# Patient Record
Sex: FEMALE | Race: WHITE | Employment: UNEMPLOYED | URBAN - METROPOLITAN AREA
[De-identification: names, ages, dates, MRNs, and addresses within clinical notes are randomized per-mention and may not be internally consistent; named-entity substitution may affect disease eponyms.]

---

## 2018-05-03 ENCOUNTER — APPOINTMENT (OUTPATIENT)
Dept: ULTRASOUND IMAGING | Age: 38
DRG: 742 | End: 2018-05-03
Payer: SUBSIDIZED

## 2018-05-03 ENCOUNTER — HOSPITAL ENCOUNTER (INPATIENT)
Age: 38
LOS: 3 days | Discharge: HOME | DRG: 742 | End: 2018-05-09
Admitting: FAMILY MEDICINE
Payer: SUBSIDIZED

## 2018-05-03 ENCOUNTER — APPOINTMENT (OUTPATIENT)
Dept: CT IMAGING | Age: 38
DRG: 742 | End: 2018-05-03
Payer: SUBSIDIZED

## 2018-05-03 ENCOUNTER — APPOINTMENT (OUTPATIENT)
Dept: GENERAL RADIOLOGY | Age: 38
DRG: 742 | End: 2018-05-03
Payer: SUBSIDIZED

## 2018-05-03 DIAGNOSIS — D72.829 LEUKOCYTOSIS, UNSPECIFIED TYPE: Primary | ICD-10-CM

## 2018-05-03 DIAGNOSIS — R10.84 GENERALIZED ABDOMINAL PAIN: ICD-10-CM

## 2018-05-03 PROBLEM — K80.20 CHOLELITHIASES: Status: ACTIVE | Noted: 2018-05-03

## 2018-05-03 PROBLEM — R10.9 ABDOMINAL PAIN: Status: ACTIVE | Noted: 2018-05-03

## 2018-05-03 PROBLEM — Z86.19 HISTORY OF HEPATITIS C: Status: ACTIVE | Noted: 2018-05-03

## 2018-05-03 LAB
ALBUMIN SERPL-MCNC: 3.2 G/DL (ref 3.5–5)
ALBUMIN/GLOB SERPL: 0.7 {RATIO} (ref 1.2–3.5)
ALP SERPL-CCNC: 82 U/L (ref 50–136)
ALT SERPL-CCNC: 30 U/L (ref 12–65)
AMPHET UR QL SCN: NEGATIVE
ANION GAP SERPL CALC-SCNC: 8 MMOL/L (ref 7–16)
APPEARANCE UR: ABNORMAL
AST SERPL-CCNC: 14 U/L (ref 15–37)
BACTERIA URNS QL MICRO: 0 /HPF
BARBITURATES UR QL SCN: NEGATIVE
BASOPHILS # BLD: 0 K/UL (ref 0–0.2)
BASOPHILS NFR BLD: 0 % (ref 0–2)
BENZODIAZ UR QL: NEGATIVE
BILIRUB SERPL-MCNC: 0.4 MG/DL (ref 0.2–1.1)
BILIRUB UR QL: ABNORMAL
BUN SERPL-MCNC: 10 MG/DL (ref 6–23)
CALCIUM SERPL-MCNC: 8.4 MG/DL (ref 8.3–10.4)
CANNABINOIDS UR QL SCN: POSITIVE
CASTS URNS QL MICRO: ABNORMAL /LPF
CHLORIDE SERPL-SCNC: 106 MMOL/L (ref 98–107)
CO2 SERPL-SCNC: 25 MMOL/L (ref 21–32)
COCAINE UR QL SCN: NEGATIVE
COLOR UR: ABNORMAL
CREAT SERPL-MCNC: 0.65 MG/DL (ref 0.6–1)
DIFFERENTIAL METHOD BLD: ABNORMAL
EOSINOPHIL # BLD: 0 K/UL (ref 0–0.8)
EOSINOPHIL NFR BLD: 0 % (ref 0.5–7.8)
EPI CELLS #/AREA URNS HPF: ABNORMAL /HPF
ERYTHROCYTE [DISTWIDTH] IN BLOOD BY AUTOMATED COUNT: 15 % (ref 11.9–14.6)
ERYTHROCYTE [DISTWIDTH] IN BLOOD BY AUTOMATED COUNT: 15 % (ref 11.9–14.6)
FLUAV AG NPH QL IA: NEGATIVE
FLUBV AG NPH QL IA: NEGATIVE
GLOBULIN SER CALC-MCNC: 4.5 G/DL (ref 2.3–3.5)
GLUCOSE SERPL-MCNC: 105 MG/DL (ref 65–100)
GLUCOSE UR STRIP.AUTO-MCNC: NEGATIVE MG/DL
HCG UR QL: NEGATIVE
HCT VFR BLD AUTO: 27.4 % (ref 35.8–46.3)
HCT VFR BLD AUTO: 30 % (ref 35.8–46.3)
HGB BLD-MCNC: 8.8 G/DL (ref 11.7–15.4)
HGB BLD-MCNC: 9.6 G/DL (ref 11.7–15.4)
HGB UR QL STRIP: ABNORMAL
IMM GRANULOCYTES # BLD: 0.1 K/UL (ref 0–0.5)
IMM GRANULOCYTES NFR BLD AUTO: 0 % (ref 0–5)
KETONES UR QL STRIP.AUTO: ABNORMAL MG/DL
LACTATE SERPL-SCNC: 0.7 MMOL/L (ref 0.4–2)
LEUKOCYTE ESTERASE UR QL STRIP.AUTO: ABNORMAL
LIPASE SERPL-CCNC: 73 U/L (ref 73–393)
LYMPHOCYTES # BLD: 1.5 K/UL (ref 0.5–4.6)
LYMPHOCYTES NFR BLD: 5 % (ref 13–44)
MCH RBC QN AUTO: 25.8 PG (ref 26.1–32.9)
MCH RBC QN AUTO: 26.1 PG (ref 26.1–32.9)
MCHC RBC AUTO-ENTMCNC: 32 G/DL (ref 31.4–35)
MCHC RBC AUTO-ENTMCNC: 32.1 G/DL (ref 31.4–35)
MCV RBC AUTO: 80.6 FL (ref 79.6–97.8)
MCV RBC AUTO: 81.3 FL (ref 79.6–97.8)
METHADONE UR QL: NEGATIVE
MONOCYTES # BLD: 0.9 K/UL (ref 0.1–1.3)
MONOCYTES NFR BLD: 3 % (ref 4–12)
NEUTS SEG # BLD: 24.7 K/UL (ref 1.7–8.2)
NEUTS SEG NFR BLD: 92 % (ref 43–78)
NITRITE UR QL STRIP.AUTO: POSITIVE
OPIATES UR QL: NEGATIVE
PCP UR QL: NEGATIVE
PH UR STRIP: 5.5 [PH] (ref 5–9)
PLATELET # BLD AUTO: 392 K/UL (ref 150–450)
PLATELET # BLD AUTO: 510 K/UL (ref 150–450)
PMV BLD AUTO: 9.4 FL (ref 10.8–14.1)
PMV BLD AUTO: 9.6 FL (ref 10.8–14.1)
POTASSIUM SERPL-SCNC: 3.6 MMOL/L (ref 3.5–5.1)
PROT SERPL-MCNC: 7.7 G/DL (ref 6.3–8.2)
PROT UR STRIP-MCNC: 100 MG/DL
RBC # BLD AUTO: 3.37 M/UL (ref 4.05–5.25)
RBC # BLD AUTO: 3.72 M/UL (ref 4.05–5.25)
RBC #/AREA URNS HPF: >100 /HPF
SERVICE CMNT-IMP: NORMAL
SODIUM SERPL-SCNC: 139 MMOL/L (ref 136–145)
SP GR UR REFRACTOMETRY: 1.03 (ref 1–1.02)
UROBILINOGEN UR QL STRIP.AUTO: 1 EU/DL (ref 0.2–1)
WBC # BLD AUTO: 21.9 K/UL (ref 4.3–11.1)
WBC # BLD AUTO: 27.2 K/UL (ref 4.3–11.1)
WBC URNS QL MICRO: ABNORMAL /HPF
WET PREP GENITAL: NORMAL

## 2018-05-03 PROCEDURE — 99285 EMERGENCY DEPT VISIT HI MDM: CPT

## 2018-05-03 PROCEDURE — 83690 ASSAY OF LIPASE: CPT | Performed by: FAMILY MEDICINE

## 2018-05-03 PROCEDURE — 74011250636 HC RX REV CODE- 250/636: Performed by: FAMILY MEDICINE

## 2018-05-03 PROCEDURE — 87491 CHLMYD TRACH DNA AMP PROBE: CPT

## 2018-05-03 PROCEDURE — 80307 DRUG TEST PRSMV CHEM ANLYZR: CPT

## 2018-05-03 PROCEDURE — 76830 TRANSVAGINAL US NON-OB: CPT

## 2018-05-03 PROCEDURE — 81001 URINALYSIS AUTO W/SCOPE: CPT | Performed by: FAMILY MEDICINE

## 2018-05-03 PROCEDURE — 74011250637 HC RX REV CODE- 250/637: Performed by: FAMILY MEDICINE

## 2018-05-03 PROCEDURE — 85027 COMPLETE CBC AUTOMATED: CPT

## 2018-05-03 PROCEDURE — 74011250637 HC RX REV CODE- 250/637

## 2018-05-03 PROCEDURE — 74011250636 HC RX REV CODE- 250/636

## 2018-05-03 PROCEDURE — 71046 X-RAY EXAM CHEST 2 VIEWS: CPT

## 2018-05-03 PROCEDURE — 81003 URINALYSIS AUTO W/O SCOPE: CPT

## 2018-05-03 PROCEDURE — 96376 TX/PRO/DX INJ SAME DRUG ADON: CPT

## 2018-05-03 PROCEDURE — 80053 COMPREHEN METABOLIC PANEL: CPT

## 2018-05-03 PROCEDURE — 96365 THER/PROPH/DIAG IV INF INIT: CPT

## 2018-05-03 PROCEDURE — 74177 CT ABD & PELVIS W/CONTRAST: CPT

## 2018-05-03 PROCEDURE — 83605 ASSAY OF LACTIC ACID: CPT

## 2018-05-03 PROCEDURE — 74011636320 HC RX REV CODE- 636/320

## 2018-05-03 PROCEDURE — 74011000258 HC RX REV CODE- 258

## 2018-05-03 PROCEDURE — 74011000258 HC RX REV CODE- 258: Performed by: FAMILY MEDICINE

## 2018-05-03 PROCEDURE — 96375 TX/PRO/DX INJ NEW DRUG ADDON: CPT

## 2018-05-03 PROCEDURE — 87210 SMEAR WET MOUNT SALINE/INK: CPT

## 2018-05-03 PROCEDURE — 99218 HC RM OBSERVATION: CPT

## 2018-05-03 PROCEDURE — 87804 INFLUENZA ASSAY W/OPTIC: CPT | Performed by: FAMILY MEDICINE

## 2018-05-03 PROCEDURE — 96366 THER/PROPH/DIAG IV INF ADDON: CPT

## 2018-05-03 PROCEDURE — 87086 URINE CULTURE/COLONY COUNT: CPT | Performed by: FAMILY MEDICINE

## 2018-05-03 PROCEDURE — 96367 TX/PROPH/DG ADDL SEQ IV INF: CPT

## 2018-05-03 PROCEDURE — 81025 URINE PREGNANCY TEST: CPT

## 2018-05-03 PROCEDURE — 76705 ECHO EXAM OF ABDOMEN: CPT

## 2018-05-03 PROCEDURE — 96361 HYDRATE IV INFUSION ADD-ON: CPT

## 2018-05-03 PROCEDURE — 85025 COMPLETE CBC W/AUTO DIFF WBC: CPT

## 2018-05-03 RX ORDER — HEPARIN SODIUM 5000 [USP'U]/ML
5000 INJECTION, SOLUTION INTRAVENOUS; SUBCUTANEOUS EVERY 8 HOURS
Status: DISCONTINUED | OUTPATIENT
Start: 2018-05-03 | End: 2018-05-08

## 2018-05-03 RX ORDER — ONDANSETRON 2 MG/ML
4 INJECTION INTRAMUSCULAR; INTRAVENOUS
Status: COMPLETED | OUTPATIENT
Start: 2018-05-03 | End: 2018-05-03

## 2018-05-03 RX ORDER — SODIUM CHLORIDE 9 MG/ML
75 INJECTION, SOLUTION INTRAVENOUS CONTINUOUS
Status: DISCONTINUED | OUTPATIENT
Start: 2018-05-03 | End: 2018-05-05

## 2018-05-03 RX ORDER — ONDANSETRON 2 MG/ML
4 INJECTION INTRAMUSCULAR; INTRAVENOUS
Status: DISCONTINUED | OUTPATIENT
Start: 2018-05-03 | End: 2018-05-09 | Stop reason: HOSPADM

## 2018-05-03 RX ORDER — PHENAZOPYRIDINE HYDROCHLORIDE 95 MG/1
200 TABLET ORAL
Status: COMPLETED | OUTPATIENT
Start: 2018-05-03 | End: 2018-05-03

## 2018-05-03 RX ORDER — VANCOMYCIN HYDROCHLORIDE
1250 ONCE
Status: COMPLETED | OUTPATIENT
Start: 2018-05-03 | End: 2018-05-03

## 2018-05-03 RX ORDER — MORPHINE SULFATE 4 MG/ML
4 INJECTION INTRAVENOUS ONCE
Status: COMPLETED | OUTPATIENT
Start: 2018-05-03 | End: 2018-05-03

## 2018-05-03 RX ORDER — SODIUM CHLORIDE 0.9 % (FLUSH) 0.9 %
10 SYRINGE (ML) INJECTION
Status: COMPLETED | OUTPATIENT
Start: 2018-05-03 | End: 2018-05-03

## 2018-05-03 RX ORDER — KETOROLAC TROMETHAMINE 30 MG/ML
30 INJECTION, SOLUTION INTRAMUSCULAR; INTRAVENOUS
Status: COMPLETED | OUTPATIENT
Start: 2018-05-03 | End: 2018-05-03

## 2018-05-03 RX ORDER — HYDROCODONE BITARTRATE AND ACETAMINOPHEN 5; 325 MG/1; MG/1
1 TABLET ORAL
Status: DISCONTINUED | OUTPATIENT
Start: 2018-05-03 | End: 2018-05-08

## 2018-05-03 RX ORDER — SODIUM CHLORIDE 0.9 % (FLUSH) 0.9 %
5-10 SYRINGE (ML) INJECTION AS NEEDED
Status: DISCONTINUED | OUTPATIENT
Start: 2018-05-03 | End: 2018-05-09 | Stop reason: HOSPADM

## 2018-05-03 RX ORDER — HYDROMORPHONE HYDROCHLORIDE 2 MG/ML
0.5 INJECTION, SOLUTION INTRAMUSCULAR; INTRAVENOUS; SUBCUTANEOUS
Status: DISCONTINUED | OUTPATIENT
Start: 2018-05-03 | End: 2018-05-04

## 2018-05-03 RX ORDER — ACETAMINOPHEN 325 MG/1
650 TABLET ORAL
Status: DISCONTINUED | OUTPATIENT
Start: 2018-05-03 | End: 2018-05-09 | Stop reason: HOSPADM

## 2018-05-03 RX ORDER — HEPARIN SODIUM 5000 [USP'U]/ML
5000 INJECTION, SOLUTION INTRAVENOUS; SUBCUTANEOUS EVERY 8 HOURS
Status: DISCONTINUED | OUTPATIENT
Start: 2018-05-03 | End: 2018-05-03 | Stop reason: SDUPTHER

## 2018-05-03 RX ORDER — SODIUM CHLORIDE 0.9 % (FLUSH) 0.9 %
5-10 SYRINGE (ML) INJECTION EVERY 8 HOURS
Status: DISCONTINUED | OUTPATIENT
Start: 2018-05-03 | End: 2018-05-08

## 2018-05-03 RX ADMIN — SODIUM CHLORIDE 100 ML: 900 INJECTION, SOLUTION INTRAVENOUS at 08:37

## 2018-05-03 RX ADMIN — SODIUM CHLORIDE 1000 ML: 900 INJECTION, SOLUTION INTRAVENOUS at 07:36

## 2018-05-03 RX ADMIN — HYDROMORPHONE HYDROCHLORIDE 0.5 MG: 2 INJECTION, SOLUTION INTRAMUSCULAR; INTRAVENOUS; SUBCUTANEOUS at 19:01

## 2018-05-03 RX ADMIN — MORPHINE SULFATE 4 MG: 4 INJECTION INTRAVENOUS at 13:50

## 2018-05-03 RX ADMIN — ONDANSETRON 4 MG: 2 INJECTION INTRAMUSCULAR; INTRAVENOUS at 19:01

## 2018-05-03 RX ADMIN — ACETAMINOPHEN 650 MG: 325 TABLET ORAL at 19:53

## 2018-05-03 RX ADMIN — Medication 10 ML: at 08:37

## 2018-05-03 RX ADMIN — PIPERACILLIN SODIUM,TAZOBACTAM SODIUM 4.5 G: 4; .5 INJECTION, POWDER, FOR SOLUTION INTRAVENOUS at 12:37

## 2018-05-03 RX ADMIN — URINARY PAIN RELIEF 190 MG: 95 TABLET ORAL at 07:36

## 2018-05-03 RX ADMIN — SODIUM CHLORIDE 125 ML/HR: 900 INJECTION, SOLUTION INTRAVENOUS at 20:08

## 2018-05-03 RX ADMIN — PIPERACILLIN SODIUM,TAZOBACTAM SODIUM 3.38 G: 3; .375 INJECTION, POWDER, FOR SOLUTION INTRAVENOUS at 20:07

## 2018-05-03 RX ADMIN — MORPHINE SULFATE 4 MG: 4 INJECTION INTRAVENOUS at 08:11

## 2018-05-03 RX ADMIN — KETOROLAC TROMETHAMINE 30 MG: 30 INJECTION, SOLUTION INTRAMUSCULAR at 07:36

## 2018-05-03 RX ADMIN — HYDROCODONE BITARTRATE AND ACETAMINOPHEN 1 TABLET: 5; 325 TABLET ORAL at 22:19

## 2018-05-03 RX ADMIN — IOPAMIDOL 100 ML: 755 INJECTION, SOLUTION INTRAVENOUS at 08:37

## 2018-05-03 RX ADMIN — ONDANSETRON 4 MG: 2 INJECTION INTRAMUSCULAR; INTRAVENOUS at 07:36

## 2018-05-03 RX ADMIN — HEPARIN SODIUM 5000 UNITS: 5000 INJECTION, SOLUTION INTRAVENOUS; SUBCUTANEOUS at 22:19

## 2018-05-03 RX ADMIN — VANCOMYCIN HYDROCHLORIDE 1250 MG: 10 INJECTION, POWDER, LYOPHILIZED, FOR SOLUTION INTRAVENOUS at 13:01

## 2018-05-03 RX ADMIN — SODIUM CHLORIDE 1000 ML: 900 INJECTION, SOLUTION INTRAVENOUS at 12:40

## 2018-05-03 NOTE — ED PROVIDER NOTES
HPI Comments: 40-year-old female complaining of suprapubic abdominal pain. Patient also describes urinary frequency and urgency. hhistory reports intermittent fever. She's had no unusual vaginal discharge. She has had a history of STDs in the remote past but nothing recently. The patient recently had been in skilled nursing  And released. 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 Patient is a 40 y.o. female presenting with abdominal pain. The history is provided by the patient. Abdominal Pain    This is a new problem. The current episode started more than 2 days ago. The problem occurs constantly. The problem has been rapidly worsening. The pain is associated with an unknown factor. The pain is located in the suprapubic region. The quality of the pain is sharp. The pain is at a severity of 10/10. The pain is severe. Associated symptoms include dysuria, frequency and hematuria. Pertinent negatives include no diarrhea and no constipation. Nothing worsens the pain. The pain is relieved by nothing. Past workup includes CT scan, ultrasound. Her past medical history is significant for gallstones and UTI. The patient's surgical history non-contributory. Past Medical History:   Diagnosis Date    Gall stones     Hepatitis C        Past Surgical History:   Procedure Laterality Date    BREAST SURGERY PROCEDURE UNLISTED      abscess    HX DILATION AND CURETTAGE           History reviewed. No pertinent family history. Social History     Social History    Marital status:      Spouse name: N/A    Number of children: N/A    Years of education: N/A     Occupational History    Not on file.      Social History Main Topics    Smoking status: Current Every Day Smoker     Packs/day: 1.00    Smokeless tobacco: Not on file    Alcohol use No    Drug use: No    Sexual activity: Not on file     Other Topics Concern    Not on file     Social History Narrative    No narrative on file         ALLERGIES: Review of patient's allergies indicates no known allergies. Review of Systems   Constitutional: Negative. Negative for activity change. HENT: Negative. Eyes: Negative. Respiratory: Negative. Cardiovascular: Negative. Gastrointestinal: Positive for abdominal pain. Negative for constipation and diarrhea. Genitourinary: Positive for dysuria, frequency and hematuria. Musculoskeletal: Negative. Skin: Negative. Neurological: Negative. Psychiatric/Behavioral: Negative. All other systems reviewed and are negative. Vitals:    05/03/18 0647   BP: 99/61   Pulse: (!) 110   Resp: 20   Temp: 98.9 °F (37.2 °C)   SpO2: 97%   Weight: 77.1 kg (170 lb)   Height: 5' 3\" (1.6 m)            Physical Exam   Constitutional: She is oriented to person, place, and time. She appears well-developed and well-nourished. No distress. HENT:   Head: Normocephalic and atraumatic. Right Ear: External ear normal.   Left Ear: External ear normal.   Nose: Nose normal.   Mouth/Throat: Oropharynx is clear and moist. No oropharyngeal exudate. Eyes: Conjunctivae and EOM are normal. Pupils are equal, round, and reactive to light. Right eye exhibits no discharge. Left eye exhibits no discharge. No scleral icterus. Neck: Normal range of motion. Neck supple. No JVD present. No tracheal deviation present. Cardiovascular: Normal rate, regular rhythm and intact distal pulses. Pulmonary/Chest: Effort normal and breath sounds normal. No stridor. No respiratory distress. She has no wheezes. She exhibits no tenderness. Abdominal: Soft. Bowel sounds are normal. She exhibits no distension and no mass. There is tenderness in the right lower quadrant and suprapubic area. There is no rigidity, no rebound, no guarding, no tenderness at McBurney's point and negative Hudson's sign. Musculoskeletal: Normal range of motion. She exhibits no edema or tenderness.    Neurological: She is alert and oriented to person, place, and time. No cranial nerve deficit. Skin: Skin is warm and dry. No rash noted. She is not diaphoretic. No erythema. No pallor. Psychiatric: She has a normal mood and affect. Her behavior is normal. Thought content normal.   Nursing note and vitals reviewed. MDM  Number of Diagnoses or Management Options  Leukocytosis, unspecified type:   Diagnosis management comments: Patient has elevated blood count leukocytosis unknown origin. Patient had a normal CAT scan except for gallbladder that was slightly distended and had gallstones. Ultrasound was done to follow this and no obvious cholecystitis. Also patient had a ultrasound of the pelvis that was essentially negative for acute infections process. Patient had pelvic exam done  Did not show chandelier sign or cervical motion tenderness. There is minimal blood tinged discharge. Few clue cells were noted. .. From that physical findings and imaging a clear cause of her leukocytosis was not  Defined. Here for hospitalist was asked to admit for further workup.         ED Course       Procedures

## 2018-05-03 NOTE — IP AVS SNAPSHOT
[address] [facility] 
 
 
 [address] 
[phone] Patient: Durga Camarena MRN: [id number] About your hospitalization You were admitted on:  May 3, 2018 You last received care in the:  [facility] SURG You were discharged on:  May 9, 2018 Why you were hospitalized Your primary diagnosis was:  Abdominal Pain Your diagnoses also included:  Cholelithiases, Leukocytosis, Pelvic Pain, History Of Hepatitis C, Uti (Urinary Tract Infection), Bacterial Vaginosis, Endometritis, Llq Pain, Hematuria, Intramural Leiomyoma Of Uterus Follow-up Information Follow up With Details Comments Contact Info Shilpi Nuñez MD On 5/17/2018 At 9:45 [id number] [address] [place] [address] 
[phone] 
  
   you will be contacted by  after follow up appt with PCP Your Scheduled Appointments Thursday May 17, 2018  9:45 AM EDT Follow Up with Shilpi Nuñez MD  
Formerly [facility] SMILEY Sanchez [address] [place] [id number]  
[phone] Discharge Orders None A check wendy indicates which time of day the medication should be taken. My Medications START taking these medications Instructions Each Dose to Equal  
 Morning Noon Evening Bedtime  
 ondansetron 4 mg disintegrating tablet Commonly known as:  ZOFRAN ODT Your next dose is:  As needed Take 1 Tab by mouth every four (4) hours as needed. 4 mg  
    
   
   
   
  
 oxyCODONE IR 10 mg Tab immediate release tablet Commonly known as:  Venkat Tayler Your next dose is:  As needed Take 1 Tab by mouth every four (4) hours as needed. Max Daily Amount: 60 mg.  
 10 mg  
    
   
   
   
  
 senna-docusate 8.6-50 mg per tablet Commonly known as:  Cherry Valeria Your next dose is:  5-10 Take 2 Tabs by mouth daily. 2 Tab CONTINUE taking these medications Instructions Each Dose to Equal  
 Morning Noon Evening Bedtime  
 sertraline 100 mg tablet Commonly known as:  ZOLOFT Your next dose is:  5-10 Take 100 mg by mouth daily. 100 mg  
    
   
   
   
  
 traZODone 150 mg tablet Commonly known as:  Jazz Homme Your next dose is:  bedtime Take 150 mg by mouth nightly. 150 mg Where to Get Your Medications Information on where to get these meds will be given to you by the nurse or doctor. ! Ask your nurse or doctor about these medications  
  ondansetron 4 mg disintegrating tablet  
 oxyCODONE IR 10 mg Tab immediate release tablet  
 senna-docusate 8.6-50 mg per tablet Opioid Education Prescription Opioids: What You Need to Know: 
 
Prescription opioids can be used to help relieve moderate-to-severe pain and are often prescribed following a surgery or injury, or for certain health conditions. These medications can be an important part of treatment but also come with serious risks. Opioids are strong pain medicines. Examples include hydrocodone, oxycodone, fentanyl, and morphine. Heroin is an example of an illegal opioid. It is important to work with your health care provider to make sure you are getting the safest, most effective care. WHAT ARE THE RISKS AND SIDE EFFECTS OF OPIOID USE? Prescription opioids carry serious risks of addiction and overdose, especially with prolonged use. An opioid overdose, often marked by slow breathing, can cause sudden death. The use of prescription opioids can have a number of side effects as well, even when taken as directed. · Tolerance-meaning you might need to take more of a medication for the same pain relief · Physical dependence-meaning you have symptoms of withdrawal when the medication is stopped. Withdrawal symptoms can include nausea, sweating, chills, diarrhea, stomach cramps, and muscle aches. Withdrawal can last up to several weeks, depending on which drug you took and how long you took it. · Increased sensitivity to pain · Constipation · Nausea, vomiting, and dry mouth · Sleepiness and dizziness · Confusion · Depression · Low levels of testosterone that can result in lower sex drive, energy, and strength · Itching and sweating RISKS ARE GREATER WITH:      
· History of drug misuse, substance use disorder, or overdose · Mental health conditions (such as depression or anxiety) · Sleep apnea · Older age (72 years or older) · Pregnancy Avoid alcohol while taking prescription opioids. Also, unless specifically advised by your health care provider, medications to avoid include: · Benzodiazepines (such as Xanax or Valium) · Muscle relaxants (such as Soma or Flexeril) · Hypnotics (such as Ambien or Lunesta) · Other prescription opioids KNOW YOUR OPTIONS Talk to your health care provider about ways to manage your pain that don't involve prescription opioids. Some of these options may actually work better and have fewer risks and side effects. Options may include: 
· Pain relievers such as acetaminophen, ibuprofen, and naproxen · Some medications that are also used for depression or seizures · Physical therapy and exercise · Counseling to help patients learn how to cope better with triggers of pain and stress. · Application of heat or cold compress · Massage therapy · Relaxation techniques Be Informed Make sure you know the name of your medication, how much and how often to take it, and its potential risks & side effects.  
 
IF YOU ARE PRESCRIBED OPIOIDS FOR PAIN: 
 · Never take opioids in greater amounts or more often than prescribed. Remember the goal is not to be pain-free but to manage your pain at a tolerable level. · Follow up with your primary care provider to: · Work together to create a plan on how to manage your pain. · Talk about ways to help manage your pain that don't involve prescription opioids. · Talk about any and all concerns and side effects. · Help prevent misuse and abuse. · Never sell or share prescription opioids · Help prevent misuse and abuse. · Store prescription opioids in a secure place and out of reach of others (this may include visitors, children, friends, and family). · Safely dispose of unused/unwanted prescription opioids: Find your community drug take-back program or your pharmacy mail-back program, or flush them down the toilet, following guidance from the Food and Drug Administration (www.fda.gov/Drugs/ResourcesForYou). · Visit www.cdc.gov/drugoverdose to learn about the risks of opioid abuse and overdose. · If you believe you may be struggling with addiction, tell your health care provider and ask for guidance or call [address] [place], [place] [address]Hopela at [phone]. Discharge Instructions DISCHARGE SUMMARY from Nurse PATIENT INSTRUCTIONS: 
 
 
F-face looks uneven A-arms unable to move or move unevenly S-speech slurred or non-existent T-time-call 911 as soon as signs and symptoms begin-DO NOT go Back to bed or wait to see if you get better-TIME IS BRAIN. Warning Signs of HEART ATTACK Call 911 if you have these symptoms: ? Chest discomfort. Most heart attacks involve discomfort in the center of the chest that lasts more than a few minutes, or that goes away and comes back. It can feel like uncomfortable pressure, squeezing, fullness, or pain. ? Discomfort in other areas of the upper body. Symptoms can include pain or discomfort in one or both arms, the back, neck, jaw, or stomach. ? Shortness of breath with or without chest discomfort. ? Other signs may include breaking out in a cold sweat, nausea, or lightheadedness. Don't wait more than five minutes to call [address]! Fast action can save your life. Calling 911 is almost always the fastest way to get lifesaving treatment. Emergency Medical Services staff can begin treatment when they arrive  up to an hour sooner than if someone gets to the hospital by car. The discharge information has been reviewed with the patient. The patient verbalized understanding. Discharge medications reviewed with the patient and appropriate educational materials and side effects teaching were provided. ___________________________________________________________________________________________________________________________________ Follow-up with Dr Ruddy Epley (Gen Surgery) approx 3 weeks after discharge in the office to discuss gallbladder surgery for gallstones at: 
[address] , [address] (CALL TO GET AN APPT TIME-->[phone]-->option 1) "[facility] [place]" Announcement We are excited to announce that we are making your provider's discharge notes available to you in "[facility] [place]". You will see these notes when they are completed and signed by the physician that discharged you from your recent hospital stay. If you have any questions or concerns about any information you see in [employer], please call the Health Information Department where you were seen or reach out to your Primary Care Provider for more information about your plan of care. Introducing [facility] & HEALTH SERVICES! [facility] introduces [employer] patient portal. Now you can access parts of your medical record, email your doctor's office, and request medication refills online. 1. In your internet browser, go to [email or web address] 2. Click on the First Time User? Click Here link in the Sign In box. You will see the New Member Sign Up page. 3. Enter your [employer] Access Code exactly as it appears below. You will not need to use this code after youve completed the sign-up process. If you do not sign up before the expiration date, you must request a new code. · [employer] Access Code: [email or web address] Expires: 8/1/2018  6:39 AM 
 
4. Enter the last four digits of your Social Security Number (xxxx) and Date of Birth (mm/dd/yyyy) as indicated and click Submit. You will be taken to the next sign-up page. 5. Create a [employer] ID. This will be your [employer] login ID and cannot be changed, so think of one that is secure and easy to remember. 6. Create a [employer] password. You can change your password at any time. 7. Enter your Password Reset Question and Answer. This can be used at a later time if you forget your password. 8. Enter your e-mail address. You will receive e-mail notification when new information is available in [address] 9. Click Sign Up. You can now view and download portions of your medical record. 10. Click the Download Summary menu link to download a portable copy of your medical information. If you have questions, please visit the Frequently Asked Questions section of the [employer] website. Remember, [employer] is NOT to be used for urgent needs. For medical emergencies, dial 911. Now available from your iPhone and Android! Introducing Daquan Traylor As a [facility] patient, I wanted to make you aware of our electronic visit tool called Daqaun Traylor. Regenerate allows you to connect within minutes with a medical provider 24 hours a day, seven days a week via a mobile device or tablet or logging into a secure website from your computer. You can access [employer] from anywhere in the [place]. A virtual visit might be right for you when you have a simple condition and feel like you just dont want to get out of bed, or cant get away from work for an appointment, when your regular My [facility] provider is not available (evenings, weekends or holidays), or when youre out of town and need minor care. Electronic visits cost only $49 and if the [employer] provider determines a prescription is needed to treat your condition, one can be electronically transmitted to a nearby pharmacy*. Please take a moment to enroll today if you have not already done so. The enrollment process is free and takes just a few minutes. To enroll, please download the Regenerate renato to your tablet or phone, or visit [email or web address] to enroll on your computer. And, as an [address] [place] [address] patient with a [employer] account, the results of your visits will be scanned into your electronic medical record and your primary care provider will be able to view the scanned results. We urge you to continue to see your regular My COI provider for your ongoing medical care. And while your primary care provider may not be the one available when you seek a [employer] virtual visit, the peace of mind you get from getting a real diagnosis real time can be priceless. For more information on [employer], view our Frequently Asked Questions (FAQs) at [email or web address]. Sincerely, 
 
Erika Myers MD 
Chief Medical Officer [employer] *:  certain medications cannot be prescribed via [employer]il[employer] Providers Seen During Your Hospitalization Provider Specialty Primary office phone Evan Rodriguez MD Emergency Medicine [phone] Shandra Farley MD [facility] [phone] Joceline Hyde MD [facility] [phone] Tammy Chamorro MD Gynecologic Oncology [phone] Your Primary Care Physician (PCP) Primary Care Physician Office Phone Office Fax NONE ** None ** ** None ** You are allergic to the following No active allergies Recent Documentation Height Weight BMI Smoking Status 1.6 m 77.1 kg 30.11 kg/m2 Current Every Day Smoker Emergency Contacts Name Discharge Info Relation Home Work Mobile Alpha Cristhian  Spouse [3] [phone] Patient Belongings The following personal items are in your possession at time of discharge: 
  Dental Appliances: None         Home Medications: None   Jewelry: None  Clothing: Undergarments    Other Valuables: None Discharge Instructions Attachments/References HYSTERECTOMY: VAGINAL LAPAROSCOPIC-ASSISTED: POST-OP (ENGLISH) Patient Handouts Laparoscopically Assisted Vaginal Hysterectomy: What to Expect at [facility] Your Recovery You can expect to feel better and stronger each day, although you may need pain medicine for a week or two. You may get tired easily or have less energy than usual. This may last for several weeks after surgery. You will probably notice that your belly is swollen and puffy. This is common. The swelling will take several weeks to go down. It may take about 4 to 6 weeks to fully recover. The recovery time may be less for some patients. You may have some light vaginal bleeding. Don't have sex until the doctor says it is okay. Don't douche or put anything into your vagina, such as a tampon, until your doctor says it is okay.  
It is important to avoid lifting while you are recovering so that you can heal. 
 This care sheet gives you a general idea about how long it will take for you to recover. But each person recovers at a different pace. Follow the steps below to get better as quickly as possible. How can you care for yourself at home? Activity ? · Rest when you feel tired. Getting enough sleep will help you recover. ? · Try to walk each day. Start by walking a little more than you did the day before. Bit by bit, increase the amount you walk. Walking boosts blood flow and helps prevent pneumonia and constipation. ? · Avoid lifting anything that would make you strain. This may include heavy grocery bags and milk containers, a heavy briefcase or backpack, cat litter or dog food bags, a vacuum , or a child. ? · Avoid strenuous activities, such as biking, jogging, weight lifting, or aerobic exercise, until your doctor says it is okay. ? · You may shower. Pat the cut (incision) dry. Do not take a bath for the first 2 weeks, or until your doctor tells you it is okay. ? · Ask your doctor when you can drive again. ? · You will probably need to take about 2 weeks off from work. It depends on the type of work you do and how you feel. ? · Your doctor will tell you when you can have sex again. Diet ? · You can eat your normal diet. If your stomach is upset, try bland, low-fat foods like plain rice, broiled chicken, toast, and yogurt. ? · Drink plenty of fluids (unless your doctor tells you not to). ? · You may notice that your bowel movements are not regular right after your surgery. This is common. Try to avoid constipation and straining with bowel movements. You may want to take a fiber supplement every day. If you have not had a bowel movement after a couple of days, ask your doctor about taking a mild laxative. Medicines ? · Your doctor will tell you if and when you can restart your medicines. He or she will also give you instructions about taking any new medicines. ? · If you take blood thinners, such as warfarin (Coumadin), clopidogrel (Plavix), or aspirin, be sure to talk to your doctor. He or she will tell you if and when to start taking those medicines again. Make sure that you understand exactly what your doctor wants you to do. ? · Be safe with medicines. Take pain medicines exactly as directed. ¨ If the doctor gave you a prescription medicine for pain, take it as prescribed. ¨ If you are not taking a prescription pain medicine, ask your doctor if you can take an over-the-counter medicine. ? · If you think your pain medicine is making you sick to your stomach: 
¨ Take your medicine after meals (unless your doctor has told you not to). ¨ Ask your doctor for a different pain medicine. ? · If your doctor prescribed antibiotics, take them as directed. Do not stop taking them just because you feel better. You need to take the full course of antibiotics. Incision care ? · You may have stitches over the cuts (incisions) the doctor made in your belly. If you have strips of tape on the incisions the doctor made, leave the tape on for a week or until it falls off. Or follow your doctor's instructions for removing the tape. ? · Wash the area daily with warm, soapy water, and pat it dry. Don't use hydrogen peroxide or alcohol, which can slow healing. You may cover the area with a gauze bandage if it weeps or rubs against clothing. Change the bandage every day. ? · Keep the area clean and dry. Other instructions ? · You may have some light vaginal bleeding. Wear sanitary pads if needed. Do not douche or use tampons. Follow-up care is a key part of your treatment and safety. Be sure to make and go to all appointments, and call your doctor if you are having problems. It's also a good idea to know your test results and keep a list of the medicines you take. When should you call for help? Call 911 anytime you think you may need emergency care. For example, call if: 
? · You passed out (lost consciousness). ? · You have chest pain, are short of breath, or cough up blood. ?Call your doctor now or seek immediate medical care if: 
? · You have pain that does not get better after you take pain medicine. ? · You cannot pass stools or gas. ? · You have vaginal discharge that has increased in amount or smells bad.  
? · You are sick to your stomach or cannot drink fluids. ? · You have loose stitches, or your incision comes open. ? · Bright red blood has soaked through the bandage over your incision. ? · You have signs of infection, such as: 
¨ Increased pain, swelling, warmth, or redness. ¨ Red streaks leading from the incision. ¨ Pus draining from the incision. ¨ A fever. ? · You have bright red vaginal bleeding that soaks one or more pads in an hour, or you have large clots. ? · You have signs of a blood clot in your leg (called a deep vein thrombosis), such as: 
¨ Pain in your calf, back of the knee, thigh, or groin. ¨ Redness and swelling in your leg. ? Watch closely for changes in your health, and be sure to contact your doctor if you have any problems. Where can you learn more? Go to http://candice-audra.info/. Enter U940 in the search box to learn more about \"Laparoscopically Assisted Vaginal Hysterectomy: What to Expect at Home. \" Current as of: October 13, 2016 Content Version: 11.4 © 1985-0047 [employer]. Care instructions adapted under license by [employer] (which disclaims liability or warranty for this information). If you have questions about a medical condition or this instruction, always ask your healthcare professional. [address] any warranty or liability for your use of this information. Please provide this summary of care documentation to your next provider. Signatures-by signing, you are acknowledging that this After Visit Summary has been reviewed with you and you have received a copy. Patient Signature:  ____________________________________________________________ Date:  ____________________________________________________________  
  
Sigmund [place] Provider Signature:  ____________________________________________________________ Date:  ____________________________________________________________

## 2018-05-03 NOTE — H&P
HOSPITALIST H&P/CONSULT  NAME:  Jennifer Holt   Age:  40 y.o.  :   1980   MRN:   [id number]  PCP: None  Consulting MD:  Treatment Team: Attending Provider: Ericka Quinn MD; Primary Nurse: Fortino Vaz  HPI:   44yo F with PMH of Hepatitis C presents with moderate to severe abdominal pain x1 week. She reports pain is mostly in lower abdomen and right quadrants and is associated with intermittent nausea and emesis. She denies diarrhea but reports chronic constipation. She has been using large quantities of Ibuprofen for pain control since last week. LMP was last week and she reports menses typically heavy with clots. She states this pain is different from menstrual cramps. In the ER, she is afebrile and normotensive. Her labs are pertinent for :  WBC 27K, HGB 9.6 and are otherwise insignificant. Complete ROS done and is as stated in HPI or otherwise negative  Past Medical History:   Diagnosis Date    Gall stones     Hepatitis C       Past Surgical History:   Procedure Laterality Date    BREAST SURGERY PROCEDURE UNLISTED      abscess    HX DILATION AND CURETTAGE        None     No Known Allergies   Social History   Substance Use Topics    Smoking status: Current Every Day Smoker     Packs/day: 1.00    Smokeless tobacco: Not on file    Alcohol use No      History reviewed. No pertinent family history. Objective:     Visit Vitals    /85    Pulse 85    Temp 98.9 °F (37.2 °C)    Resp 18    Ht 5' 3\" (1.6 m)    Wt 77.1 kg (170 lb)    SpO2 99%    BMI 30.11 kg/m2      Temp (24hrs), Av.9 °F (37.2 °C), Min:98.9 °F (37.2 °C), Max:98.9 °F (37.2 °C)    Oxygen Therapy  O2 Sat (%): 99 % (18 1355)  O2 Device: Room air (18 1220)  Physical Exam:  General:    Alert, cooperative, no distress, appears stated age. Head:   Normocephalic, without obvious abnormality, atraumatic. Nose:  Nares normal. No drainage or sinus tenderness.   Lungs:   Clear to auscultation bilaterally. No Wheezing or Rhonchi. No rales. Heart:   Regular rate and rhythm,  no murmur, rub or gallop. Abdomen:   Obese, Soft, diffusely TTP. No guarding or rebound. Not distended. Bowel sounds normal.   Extremities: No cyanosis. No edema. No clubbing  Skin:     Texture, turgor normal. No rashes or lesions. Not Jaundiced  Neurologic: Alert and oriented x 3, no focal deficits   Data Review:   Recent Results (from the past 24 hour(s))   CBC WITH AUTOMATED DIFF    Collection Time: 05/03/18  6:52 AM   Result Value Ref Range    WBC 27.2 (H) 4.3 - 11.1 K/uL    RBC 3.72 (L) 4.05 - 5.25 M/uL    HGB 9.6 (L) 11.7 - 15.4 g/dL    HCT 30.0 (L) 35.8 - 46.3 %    MCV 80.6 79.6 - 97.8 FL    MCH 25.8 (L) 26.1 - 32.9 PG    MCHC 32.0 31.4 - 35.0 g/dL    RDW 15.0 (H) 11.9 - 14.6 %    PLATELET 463 (H) 044 - 450 K/uL    MPV 9.6 (L) 10.8 - 14.1 FL    DF AUTOMATED      NEUTROPHILS 92 (H) 43 - 78 %    LYMPHOCYTES 5 (L) 13 - 44 %    MONOCYTES 3 (L) 4.0 - 12.0 %    EOSINOPHILS 0 (L) 0.5 - 7.8 %    BASOPHILS 0 0.0 - 2.0 %    IMMATURE GRANULOCYTES 0 0.0 - 5.0 %    ABS. NEUTROPHILS 24.7 (H) 1.7 - 8.2 K/UL    ABS. LYMPHOCYTES 1.5 0.5 - 4.6 K/UL    ABS. MONOCYTES 0.9 0.1 - 1.3 K/UL    ABS. EOSINOPHILS 0.0 0.0 - 0.8 K/UL    ABS. BASOPHILS 0.0 0.0 - 0.2 K/UL    ABS. IMM. GRANS. 0.1 0.0 - 0.5 K/UL   METABOLIC PANEL, COMPREHENSIVE    Collection Time: 05/03/18  6:52 AM   Result Value Ref Range    Sodium 139 136 - 145 mmol/L    Potassium 3.6 3.5 - 5.1 mmol/L    Chloride 106 98 - 107 mmol/L    CO2 25 21 - 32 mmol/L    Anion gap 8 7 - 16 mmol/L    Glucose 105 (H) 65 - 100 mg/dL    BUN 10 6 - 23 MG/DL    Creatinine 0.65 0.6 - 1.0 MG/DL    GFR est AA >60 >60 ml/min/1.73m2    GFR est non-AA >60 >60 ml/min/1.73m2    Calcium 8.4 8.3 - 10.4 MG/DL    Bilirubin, total 0.4 0.2 - 1.1 MG/DL    ALT (SGPT) 30 12 - 65 U/L    AST (SGOT) 14 (L) 15 - 37 U/L    Alk.  phosphatase 82 50 - 136 U/L    Protein, total 7.7 6.3 - 8.2 g/dL    Albumin 3.2 (L) 3.5 - 5.0 g/dL Globulin 4.5 (H) 2.3 - 3.5 g/dL    A-G Ratio 0.7 (L) 1.2 - 3.5     DRUG SCREEN, URINE    Collection Time: 05/03/18  7:26 AM   Result Value Ref Range    PCP(PHENCYCLIDINE) NEGATIVE       BENZODIAZEPINES NEGATIVE       COCAINE NEGATIVE       AMPHETAMINES NEGATIVE       METHADONE NEGATIVE       THC (TH-CANNABINOL) POSITIVE      OPIATES NEGATIVE       BARBITURATES NEGATIVE      HCG URINE, QL. - POC    Collection Time: 05/03/18  8:33 AM   Result Value Ref Range    Pregnancy test,urine (POC) NEGATIVE  NEG     WET PREP    Collection Time: 05/03/18  9:34 AM   Result Value Ref Range    Special Requests: NO SPECIAL REQUESTS      Wet prep NO WBCS SEEN      Wet prep NO YEAST SEEN      Wet prep NO TRICHOMONAS SEEN      Wet prep FEW  CLUE CELLS PRESENT       CBC W/O DIFF    Collection Time: 05/03/18 11:26 AM   Result Value Ref Range    WBC 21.9 (H) 4.3 - 11.1 K/uL    RBC 3.37 (L) 4.05 - 5.25 M/uL    HGB 8.8 (L) 11.7 - 15.4 g/dL    HCT 27.4 (L) 35.8 - 46.3 %    MCV 81.3 79.6 - 97.8 FL    MCH 26.1 26.1 - 32.9 PG    MCHC 32.1 31.4 - 35.0 g/dL    RDW 15.0 (H) 11.9 - 14.6 %    PLATELET 833 070 - 321 K/uL    MPV 9.4 (L) 10.8 - 14.1 FL   LACTIC ACID    Collection Time: 05/03/18 11:26 AM   Result Value Ref Range    Lactic acid 0.7 0.4 - 2.0 MMOL/L     Imaging /Procedures /Studies   Abdominal Ultrasound   Liver is enlarged measuring 20.8 cm in height. No liver mass. No intrahepatic  bile duct distention. Common bile duct is mildly prominent, 8 mm. Gallbladder is  distended. No wall thickening. Multiple shadowing stones are present. Pancreas  is normal. Right kidney is normal. No hydronephrosis. Abdominal aorta is normal  in size distally, 1.7 cm. IVC patent. Transabdominal and transvaginal images. The uterus measures 13 x 6.8 x 8.5 cm. There is a 7.4 x 6.9 cm fibroid in the  body of the uterus. Endometrial stripe is normal, 7 mm. The ovaries were not visualized. No adnexal mass or free fluid. CT Abd/pelvis Impression:  1. Cholelithiasis. Mild gallbladder distention. 2. Normal-appearing appendix    Assessment and Plan: Active Hospital Problems    Diagnosis Date Noted    Cholelithiases 05/03/2018    Leukocytosis 05/03/2018    Abdominal pain 05/03/2018   H/o Hepatitis C  Marijuana use      PLAN  · Urinalysis  And Lipase are pending  · No evidence of cholecystitis so source is still unknown  · Start Protonix for excessive Ibuprofen  · NPO except sips of clears. Advance diet as tolerated  · Continue  IVF  · Antiemetics prn and Pain control  · Repeat labs in am.  · S/p Vanc and Zosyn x1 in ER.  Continue Zosyn    Code Status: Full   Anticipated discharge: 2 midnights     Signed By: James Colindres MD     May 3, 2018

## 2018-05-03 NOTE — PROGRESS NOTES
Initial visit to assess pt's spiritual needs. Ministry of presence & prayer to demonstrate caring & concern, convey emotional & spiritual support.     aniceto Dominguez MDiv,ThM,PhD

## 2018-05-03 NOTE — ED TRIAGE NOTES
Patient states blood in urine, lower abdominal pain. States pain has been on going all week. States some nausea and vomiting. States blood in urine started this morning.

## 2018-05-04 PROBLEM — N71.9 ENDOMETRITIS: Status: ACTIVE | Noted: 2018-05-04

## 2018-05-04 PROBLEM — R31.9 HEMATURIA: Status: ACTIVE | Noted: 2018-05-04

## 2018-05-04 PROBLEM — N39.0 UTI (URINARY TRACT INFECTION): Status: ACTIVE | Noted: 2018-05-04

## 2018-05-04 PROBLEM — R10.32 LLQ PAIN: Status: ACTIVE | Noted: 2018-05-04

## 2018-05-04 PROBLEM — B96.89 BACTERIAL VAGINOSIS: Status: ACTIVE | Noted: 2018-05-04

## 2018-05-04 PROBLEM — N76.0 BACTERIAL VAGINOSIS: Status: ACTIVE | Noted: 2018-05-04

## 2018-05-04 PROBLEM — R10.2 PELVIC PAIN: Status: ACTIVE | Noted: 2018-05-03

## 2018-05-04 LAB
ALBUMIN SERPL-MCNC: 2.4 G/DL (ref 3.5–5)
ALBUMIN/GLOB SERPL: 0.6 {RATIO} (ref 1.2–3.5)
ALP SERPL-CCNC: 60 U/L (ref 50–136)
ALT SERPL-CCNC: 21 U/L (ref 12–65)
ANION GAP SERPL CALC-SCNC: 6 MMOL/L (ref 7–16)
AST SERPL-CCNC: 14 U/L (ref 15–37)
BASOPHILS # BLD: 0 K/UL (ref 0–0.2)
BASOPHILS NFR BLD: 0 % (ref 0–2)
BILIRUB SERPL-MCNC: 0.2 MG/DL (ref 0.2–1.1)
BUN SERPL-MCNC: 8 MG/DL (ref 6–23)
CALCIUM SERPL-MCNC: 7.5 MG/DL (ref 8.3–10.4)
CHLORIDE SERPL-SCNC: 109 MMOL/L (ref 98–107)
CO2 SERPL-SCNC: 27 MMOL/L (ref 21–32)
CREAT SERPL-MCNC: 0.48 MG/DL (ref 0.6–1)
DIFFERENTIAL METHOD BLD: ABNORMAL
EOSINOPHIL # BLD: 0.1 K/UL (ref 0–0.8)
EOSINOPHIL NFR BLD: 1 % (ref 0.5–7.8)
ERYTHROCYTE [DISTWIDTH] IN BLOOD BY AUTOMATED COUNT: 15 % (ref 11.9–14.6)
GLOBULIN SER CALC-MCNC: 3.7 G/DL (ref 2.3–3.5)
GLUCOSE SERPL-MCNC: 105 MG/DL (ref 65–100)
HCT VFR BLD AUTO: 25.2 % (ref 35.8–46.3)
HGB BLD-MCNC: 7.9 G/DL (ref 11.7–15.4)
IMM GRANULOCYTES # BLD: 0 K/UL (ref 0–0.5)
IMM GRANULOCYTES NFR BLD AUTO: 0 % (ref 0–5)
LYMPHOCYTES # BLD: 1.6 K/UL (ref 0.5–4.6)
LYMPHOCYTES NFR BLD: 15 % (ref 13–44)
MCH RBC QN AUTO: 25.5 PG (ref 26.1–32.9)
MCHC RBC AUTO-ENTMCNC: 31.3 G/DL (ref 31.4–35)
MCV RBC AUTO: 81.3 FL (ref 79.6–97.8)
MONOCYTES # BLD: 0.5 K/UL (ref 0.1–1.3)
MONOCYTES NFR BLD: 5 % (ref 4–12)
NEUTS SEG # BLD: 8.5 K/UL (ref 1.7–8.2)
NEUTS SEG NFR BLD: 79 % (ref 43–78)
PLATELET # BLD AUTO: 392 K/UL (ref 150–450)
PMV BLD AUTO: 9.1 FL (ref 10.8–14.1)
POTASSIUM SERPL-SCNC: 3.5 MMOL/L (ref 3.5–5.1)
PROT SERPL-MCNC: 6.1 G/DL (ref 6.3–8.2)
RBC # BLD AUTO: 3.1 M/UL (ref 4.05–5.25)
SODIUM SERPL-SCNC: 142 MMOL/L (ref 136–145)
WBC # BLD AUTO: 10.7 K/UL (ref 4.3–11.1)

## 2018-05-04 PROCEDURE — 99218 HC RM OBSERVATION: CPT

## 2018-05-04 PROCEDURE — 80053 COMPREHEN METABOLIC PANEL: CPT | Performed by: FAMILY MEDICINE

## 2018-05-04 PROCEDURE — 74011250636 HC RX REV CODE- 250/636: Performed by: FAMILY MEDICINE

## 2018-05-04 PROCEDURE — 36415 COLL VENOUS BLD VENIPUNCTURE: CPT | Performed by: FAMILY MEDICINE

## 2018-05-04 PROCEDURE — 74011250637 HC RX REV CODE- 250/637: Performed by: FAMILY MEDICINE

## 2018-05-04 PROCEDURE — 74011000258 HC RX REV CODE- 258: Performed by: FAMILY MEDICINE

## 2018-05-04 PROCEDURE — 85025 COMPLETE CBC W/AUTO DIFF WBC: CPT | Performed by: FAMILY MEDICINE

## 2018-05-04 RX ORDER — KETOROLAC TROMETHAMINE 15 MG/ML
15 INJECTION, SOLUTION INTRAMUSCULAR; INTRAVENOUS ONCE
Status: COMPLETED | OUTPATIENT
Start: 2018-05-04 | End: 2018-05-04

## 2018-05-04 RX ORDER — PANTOPRAZOLE SODIUM 40 MG/1
40 TABLET, DELAYED RELEASE ORAL
Status: DISCONTINUED | OUTPATIENT
Start: 2018-05-04 | End: 2018-05-09 | Stop reason: HOSPADM

## 2018-05-04 RX ORDER — MORPHINE SULFATE 4 MG/ML
1 INJECTION, SOLUTION INTRAMUSCULAR; INTRAVENOUS
Status: DISCONTINUED | OUTPATIENT
Start: 2018-05-04 | End: 2018-05-06

## 2018-05-04 RX ORDER — METRONIDAZOLE 500 MG/1
500 TABLET ORAL EVERY 12 HOURS
Status: DISCONTINUED | OUTPATIENT
Start: 2018-05-04 | End: 2018-05-08

## 2018-05-04 RX ADMIN — PIPERACILLIN SODIUM,TAZOBACTAM SODIUM 3.38 G: 3; .375 INJECTION, POWDER, FOR SOLUTION INTRAVENOUS at 13:25

## 2018-05-04 RX ADMIN — KETOROLAC TROMETHAMINE 15 MG: 15 INJECTION, SOLUTION INTRAMUSCULAR; INTRAVENOUS at 08:56

## 2018-05-04 RX ADMIN — HYDROMORPHONE HYDROCHLORIDE 0.5 MG: 2 INJECTION, SOLUTION INTRAMUSCULAR; INTRAVENOUS; SUBCUTANEOUS at 06:53

## 2018-05-04 RX ADMIN — HYDROCODONE BITARTRATE AND ACETAMINOPHEN 1 TABLET: 5; 325 TABLET ORAL at 20:59

## 2018-05-04 RX ADMIN — METRONIDAZOLE 500 MG: 500 TABLET ORAL at 20:59

## 2018-05-04 RX ADMIN — PANTOPRAZOLE SODIUM 40 MG: 40 TABLET, DELAYED RELEASE ORAL at 08:56

## 2018-05-04 RX ADMIN — HYDROCODONE BITARTRATE AND ACETAMINOPHEN 1 TABLET: 5; 325 TABLET ORAL at 05:48

## 2018-05-04 RX ADMIN — HEPARIN SODIUM 5000 UNITS: 5000 INJECTION, SOLUTION INTRAVENOUS; SUBCUTANEOUS at 06:30

## 2018-05-04 RX ADMIN — Medication 10 ML: at 13:27

## 2018-05-04 RX ADMIN — SODIUM CHLORIDE 125 ML/HR: 900 INJECTION, SOLUTION INTRAVENOUS at 05:58

## 2018-05-04 RX ADMIN — SODIUM CHLORIDE 75 ML/HR: 900 INJECTION, SOLUTION INTRAVENOUS at 20:57

## 2018-05-04 RX ADMIN — PIPERACILLIN SODIUM,TAZOBACTAM SODIUM 3.38 G: 3; .375 INJECTION, POWDER, FOR SOLUTION INTRAVENOUS at 20:58

## 2018-05-04 RX ADMIN — ACETAMINOPHEN 650 MG: 325 TABLET ORAL at 20:59

## 2018-05-04 RX ADMIN — Medication 10 ML: at 21:00

## 2018-05-04 RX ADMIN — Medication 30 ML: at 17:39

## 2018-05-04 RX ADMIN — MORPHINE SULFATE 1 MG: 4 INJECTION, SOLUTION INTRAMUSCULAR; INTRAVENOUS at 22:36

## 2018-05-04 RX ADMIN — METRONIDAZOLE 500 MG: 500 TABLET ORAL at 15:43

## 2018-05-04 RX ADMIN — Medication 10 ML: at 06:01

## 2018-05-04 RX ADMIN — HYDROMORPHONE HYDROCHLORIDE 0.5 MG: 2 INJECTION, SOLUTION INTRAMUSCULAR; INTRAVENOUS; SUBCUTANEOUS at 01:26

## 2018-05-04 RX ADMIN — HEPARIN SODIUM 5000 UNITS: 5000 INJECTION, SOLUTION INTRAVENOUS; SUBCUTANEOUS at 21:01

## 2018-05-04 RX ADMIN — PIPERACILLIN SODIUM,TAZOBACTAM SODIUM 3.38 G: 3; .375 INJECTION, POWDER, FOR SOLUTION INTRAVENOUS at 04:47

## 2018-05-04 RX ADMIN — Medication 30 ML: at 13:26

## 2018-05-04 RX ADMIN — MORPHINE SULFATE 1 MG: 4 INJECTION, SOLUTION INTRAMUSCULAR; INTRAVENOUS at 17:39

## 2018-05-04 RX ADMIN — MORPHINE SULFATE 1 MG: 4 INJECTION, SOLUTION INTRAMUSCULAR; INTRAVENOUS at 13:25

## 2018-05-04 NOTE — PROGRESS NOTES
LMSW met with pt and sister at bedside. Will follow pt plan of care and assist as supportive care needs are known. Pt has been seen by [facility] for medical financial assistance and will apply for [facility] dionte. Care Management Interventions  PCP Verified by CM:  (Pt states will follow up at [facility] )  Mode of Transport at Discharge:  (sister)  Transition of Care Consult (CM Consult): Discharge Planning (Pt is uninsured.   She is normally independent with ADL's.)  Discharge Durable Medical Equipment: No  Physical Therapy Consult: No  Occupational Therapy Consult: No  Current Support Network: Own Home, Lives with Spouse (pt has supportive family)  Confirm Follow Up Transport: Family  Plan discussed with Pt/Family/Caregiver: Yes  Freedom of Choice Offered: Yes  Discharge Location  Discharge Placement: Home

## 2018-05-04 NOTE — ED NOTES
Report given to [facility], [address] [facility]. Transfer of care at this time.

## 2018-05-04 NOTE — PROGRESS NOTES
Primary Nurse Debby Mcnally RN and Rajesh Rosario RN performed a dual skin assessment on this patient, patient has 3 tattoos on her right arm and one on the right side of her back  Calvin score is 22

## 2018-05-04 NOTE — PROGRESS NOTES
Hourly rounds complete this shift, no new complaints at this time, patient c/o abd pain, crying, doubled over and rocking in pain even after pain meds were given     bed in low, locked position, call light and bedside table within reach,  all needs met. Will continue to monitor Report to day shift nurse.

## 2018-05-04 NOTE — PROGRESS NOTES
Hospitalist Progress Note     Admit Date:  5/3/2018  6:55 AM   Name:  Luis Alfredo Mcleod   Age:  40 y.o.  :  1980   MRN:  [id number]   PCP:  None  Treatment Team: Attending Provider: Berlin Plasencia MD; Utilization Review: Alisia Silva RN; Consulting Provider: Mark Field MD; Consulting Provider: Xander Brink MD    Subjective:   46yo F with PMH of Hepatitis C presents with moderate to severe abdominal pain x1 week. She reports pain is mostly in lower abdomen and right quadrants and is associated with intermittent nausea and emesis. She denies diarrhea but reports chronic constipation. She has been using large quantities of Ibuprofen for pain control since last week. LMP was last week and she reports menses typically heavy with clots. She states this pain is different from menstrual cramps. In the ER, she is afebrile and normotensive. Her labs are pertinent for :  WBC 27K, HGB 9.6 and are otherwise insignificant. 18  Family at bedside  C/o lower abd pain  Wants to eat    Objective:   Patient Vitals for the past 24 hrs:   Temp Pulse Resp BP SpO2   18 1623 99.3 °F (37.4 °C) 84 16 117/71 99 %   18 1046 98.2 °F (36.8 °C) 82 18 119/74 94 %   18 0648 98.9 °F (37.2 °C) 92 18 124/55 95 %   18 0445 98.1 °F (36.7 °C) 72 16 163/67 96 %   18 0112 98.2 °F (36.8 °C) 73 18 105/65 95 %   18 2212 99.2 °F (37.3 °C) 79 16 125/59 97 %   18 2206 - 86 - 125/59 96 %   18 1930 98.7 °F (37.1 °C) 92 18 127/79 96 %   18 1900 98.5 °F (36.9 °C) 88 18 138/88 98 %     Oxygen Therapy  O2 Sat (%): 99 % (18 1623)  Pulse via Oximetry: 86 beats per minute (05/03/18 2206)  O2 Device: Room air (18 2212)  No intake or output data in the 24 hours ending 18 4577      General:    Well nourished. Alert.    heent- normal  CV:   RRR. No murmur, rub, or gallop. Lungs:   Clear to auscultation bilaterally. No wheezing, rhonchi, or rales.   Abdomen: Suprapubic and rt lower quadrant tender,mild guarding, no rebound. Cns- normal  Extremities: Warm and dry. No cyanosis or edema. Skin:     No rashes or jaundice. Data Review:  I have reviewed all labs, meds, telemetry events, and studies from the last 24 hours. Recent Results (from the past 24 hour(s))   URINALYSIS W/ RFLX MICROSCOPIC    Collection Time: 05/03/18  8:22 PM   Result Value Ref Range    Color ORANGE      Appearance CLOUDY      Specific gravity 1.029 (H) 1.001 - 1.023      pH (UA) 5.5 5.0 - 9.0      Protein 100 (A) NEG mg/dL    Glucose NEGATIVE  mg/dL    Ketone TRACE (A) NEG mg/dL    Bilirubin SMALL (A) NEG      Blood LARGE (A) NEG      Urobilinogen 1.0 0.2 - 1.0 EU/dL    Nitrites POSITIVE (A) NEG      Leukocyte Esterase SMALL (A) NEG      WBC 20-50 0 /hpf    RBC >100 (H) 0 /hpf    Epithelial cells 0-3 0 /hpf    Bacteria 0 0 /hpf    Casts 1-30 0 /lpf   METABOLIC PANEL, COMPREHENSIVE    Collection Time: 05/04/18  5:26 AM   Result Value Ref Range    Sodium 142 136 - 145 mmol/L    Potassium 3.5 3.5 - 5.1 mmol/L    Chloride 109 (H) 98 - 107 mmol/L    CO2 27 21 - 32 mmol/L    Anion gap 6 (L) 7 - 16 mmol/L    Glucose 105 (H) 65 - 100 mg/dL    BUN 8 6 - 23 MG/DL    Creatinine 0.48 (L) 0.6 - 1.0 MG/DL    GFR est AA >60 >60 ml/min/1.73m2    GFR est non-AA >60 >60 ml/min/1.73m2    Calcium 7.5 (L) 8.3 - 10.4 MG/DL    Bilirubin, total 0.2 0.2 - 1.1 MG/DL    ALT (SGPT) 21 12 - 65 U/L    AST (SGOT) 14 (L) 15 - 37 U/L    Alk.  phosphatase 60 50 - 136 U/L    Protein, total 6.1 (L) 6.3 - 8.2 g/dL    Albumin 2.4 (L) 3.5 - 5.0 g/dL    Globulin 3.7 (H) 2.3 - 3.5 g/dL    A-G Ratio 0.6 (L) 1.2 - 3.5     CBC WITH AUTOMATED DIFF    Collection Time: 05/04/18  5:26 AM   Result Value Ref Range    WBC 10.7 4.3 - 11.1 K/uL    RBC 3.10 (L) 4.05 - 5.25 M/uL    HGB 7.9 (L) 11.7 - 15.4 g/dL    HCT 25.2 (L) 35.8 - 46.3 %    MCV 81.3 79.6 - 97.8 FL    MCH 25.5 (L) 26.1 - 32.9 PG    MCHC 31.3 (L) 31.4 - 35.0 g/dL    RDW 15.0 (H) 11.9 - 14.6 %    PLATELET 903 113 - 878 K/uL    MPV 9.1 (L) 10.8 - 14.1 FL    DF AUTOMATED      NEUTROPHILS 79 (H) 43 - 78 %    LYMPHOCYTES 15 13 - 44 %    MONOCYTES 5 4.0 - 12.0 %    EOSINOPHILS 1 0.5 - 7.8 %    BASOPHILS 0 0.0 - 2.0 %    IMMATURE GRANULOCYTES 0 0.0 - 5.0 %    ABS. NEUTROPHILS 8.5 (H) 1.7 - 8.2 K/UL    ABS. LYMPHOCYTES 1.6 0.5 - 4.6 K/UL    ABS. MONOCYTES 0.5 0.1 - 1.3 K/UL    ABS. EOSINOPHILS 0.1 0.0 - 0.8 K/UL    ABS. BASOPHILS 0.0 0.0 - 0.2 K/UL    ABS. IMM. GRANS. 0.0 0.0 - 0.5 K/UL        All Micro Results     Procedure Component Value Units Date/Time    CULTURE, URINE [[id number]] Collected:  05/03/18 0726    Order Status:  Completed Specimen:  Urine from Clean catch Updated:  05/04/18 0818    INFLUENZA A & B AG (RAPID TEST) [[id number]] Collected:  05/03/18 1553    Order Status:  Completed Specimen:  Nasopharyngeal from Nasal washing Updated:  05/03/18 1619     Influenza A Ag NEGATIVE          NEGATIVE FOR THE PRESENCE OF INFLUENZA A ANTIGEN  INFECTION DUE TO INFLUENZA A CANNOT BE RULED OUT. BECAUSE THE ANTIGEN PRESENT IN THE SAMPLE MAY BE BELOW  THE DETECTION LIMIT OF THE TEST. A NEGATIVE TEST IS PRESUMPTIVE AND IT IS RECOMMENDED THAT THESE RESULTS BE CONFIRMED BY VIRAL CULTURE OR AN FDA-CLEARED INFLUENZA A AND B MOLECULAR ASSAY. Influenza B Ag NEGATIVE          NEGATIVE FOR THE PRESENCE OF INFLUENZA B ANTIGEN  INFECTION DUE TO INFLUENZA B CANNOT BE RULED OUT. BECAUSE THE ANTIGEN PRESENT IN THE SAMPLE MAY BE BELOW  THE DETECTION LIMIT OF THE TEST. A NEGATIVE TEST IS PRESUMPTIVE AND IT IS RECOMMENDED THAT THESE RESULTS BE CONFIRMED BY VIRAL CULTURE OR AN FDA-CLEARED INFLUENZA A AND B MOLECULAR ASSAY.          WET PREP [[id number]] Collected:  05/03/18 0934    Order Status:  Completed Specimen:  Vagina Updated:  05/03/18 0925     Special Requests: NO SPECIAL REQUESTS        Wet prep NO WBCS SEEN        Wet prep NO YEAST SEEN        Wet prep NO TRICHOMONAS SEEN        Wet prep -- FEW  CLUE CELLS PRESENT            Current Meds:  Current Facility-Administered Medications   Medication Dose Route Frequency    pantoprazole (PROTONIX) tablet 40 mg  40 mg Oral ACB    morphine injection 1 mg  1 mg IntraVENous Q4H PRN    GI Cocktail  30 mL Oral Q6H    metroNIDAZOLE (FLAGYL) tablet 500 mg  500 mg Oral Q12H    sodium chloride (NS) flush 5-10 mL  5-10 mL IntraVENous Q8H    sodium chloride (NS) flush 5-10 mL  5-10 mL IntraVENous PRN    acetaminophen (TYLENOL) tablet 650 mg  650 mg Oral Q4H PRN    HYDROcodone-acetaminophen (NORCO) 5-325 mg per tablet 1 Tab  1 Tab Oral Q4H PRN    ondansetron (ZOFRAN) injection 4 mg  4 mg IntraVENous Q4H PRN    heparin (porcine) injection 5,000 Units  5,000 Units SubCUTAneous Q8H    0.9% sodium chloride infusion  75 mL/hr IntraVENous CONTINUOUS    piperacillin-tazobactam (ZOSYN) 3.375 g in 0.9% sodium chloride (MBP/ADV) 100 mL  3.375 g IntraVENous Q8H       Other Studies (last 24 hours):  No results found.     Assessment and Plan:     Hospital Problems as of 5/4/2018  Never Reviewed          Codes Class Noted - Resolved POA    Cholelithiases ICD-10-CM: K80.20  ICD-9-CM: 574.20  5/3/2018 - Present Unknown        Leukocytosis ICD-10-CM: D72.829  ICD-9-CM: 288.60  5/3/2018 - Present Unknown        * (Principal)Abdominal pain ICD-10-CM: R10.9  ICD-9-CM: 789.00  5/3/2018 - Present Unknown        History of hepatitis C ICD-10-CM: Z86.19  ICD-9-CM: V12.09  5/3/2018 - Present Unknown    Uti,bacterai vaginosis          PLAN:    abd pain- suprapubic and rt lower-?etiology- ct and pelvic ultrasound beningn findings-consulted surgery and gynecology  uti- on zosyn  Bacterial vaginosis- flagyl  Hept c  cholelithiasis    DC planning/Dispo:    DVT ppx:  heparin    Signed:  Junaid Barlow MD

## 2018-05-04 NOTE — CONSULTS
H&P/Consult Note/Progress Note/Office Note:   Miroslava Robins  MRN: [id number]  :1980  Age:37 y.o.    HPI: Miroslava Robins is a 40 y.o. female who has a PMHx of Hep C and admit drug screen + for [facility]   who presented to ED today with severe lower abdominal/pelvic pain  X 1 week, mostly in the LLQ and suprapubic area. General surgery was asked to evaluate. She had associated N/V. She also reported hematuria. LMP last week. Chronic constipation. WBC 27 initially, now down to 10.7. Hgb 9.6. Pt had US which demonstrated gallstones and distention, CBD 8mm, no cholecystitis. t bili 0.2. LFTs WNL. She endorses a 1mo h/o pelvic pain which has worsened. She has taken a large amount of ibuprofen with no improvement in pain. She has had some vaginal bleeding. She describes her pelvic pain as burning and severe. She has associated N/v. She reports occasional mild RUQ pain with bloating after eating. This is not bothering her right now. She is typically very constipated. + fevers and chills, nights sweats. Denies weight loss. 18 CT abd/pelvis with IV contrast  Hx: Blood in urine. Lower abdominal pain for one week. N/V    Upper images show no infiltrate in either lung base. Spleen is normal. Liver is enlarged, 21 cm in height. No focal liver mass. Large calcified stones are present in the gallbladder. Gallbladder is mildly distended. Pancreas is normal.  Adrenals are normal. Kidneys are normal. No hydronephrosis. The abdominal aorta is normal in size. No adenopathy.     CT Pelvis: There is a large uterine fibroid. No pelvic adenopathy. No free fluid. Bowel loops have a grossly normal appearance in the abdomen and pelvis. The  appendix is normal in size. It measures approximately 4.5 mm in diameter. No infiltration in the periappendiceal fat.       Impression:  1. Cholelithiasis. Mild gallbladder distention.   2. Normal-appearing appendix         5/3/18 Pelvic U/S  Hx: Severe pelvic pain for 2 weeks  Transabdominal and transvaginal images. The uterus measures 13 x 6.8 x 8.5 cm. There is a 7.4 x 6.9 cm fibroid in the body of the uterus. Endometrial stripe is normal, 7 mm. The ovaries were not visualized. No adnexal mass or free fluid. IMPRESSION: Uterine fibroid      5/3/18 RUQ U/S  Liver is enlarged measuring 20.8 cm in height. No liver mass. No intrahepatic bile duct distention. CBD mildly prominent, 8 mm. Gallbladder is distended. No wall thickening. Multiple shadowing stones are present. Pancreas is normal. Right kidney is normal. No hydronephrosis. Abdominal aorta is normal distally, 1.7 cm. IVC patent. IMPRESSION:  1. Cholelithiasis. Mild gallbladder distention.   2. Hepatomegaly    5/3/18 CXR, 2 views  Hx: Leukocytosis  IMPRESSION: No acute abnormality        Past Medical History:   Diagnosis Date    Gall stones     Hepatitis C      Past Surgical History:   Procedure Laterality Date    BREAST SURGERY PROCEDURE UNLISTED      abscess    HX DILATION AND CURETTAGE       Current Facility-Administered Medications   Medication Dose Route Frequency    pantoprazole (PROTONIX) tablet 40 mg  40 mg Oral ACB    morphine injection 1 mg  1 mg IntraVENous Q4H PRN    GI Cocktail  30 mL Oral Q6H    metroNIDAZOLE (FLAGYL) tablet 500 mg  500 mg Oral Q12H    sodium chloride (NS) flush 5-10 mL  5-10 mL IntraVENous Q8H    sodium chloride (NS) flush 5-10 mL  5-10 mL IntraVENous PRN    acetaminophen (TYLENOL) tablet 650 mg  650 mg Oral Q4H PRN    HYDROcodone-acetaminophen (NORCO) 5-325 mg per tablet 1 Tab  1 Tab Oral Q4H PRN    ondansetron (ZOFRAN) injection 4 mg  4 mg IntraVENous Q4H PRN    heparin (porcine) injection 5,000 Units  5,000 Units SubCUTAneous Q8H    0.9% sodium chloride infusion  75 mL/hr IntraVENous CONTINUOUS    piperacillin-tazobactam (ZOSYN) 3.375 g in 0.9% sodium chloride (MBP/ADV) 100 mL  3.375 g IntraVENous Q8H     Review of patient's allergies indicates no known allergies. Social History     Social History    Marital status:      Spouse name: N/A    Number of children: N/A    Years of education: N/A     Social History Main Topics    Smoking status: Current Every Day Smoker     Packs/day: 1.00    Smokeless tobacco: None    Alcohol use No    Drug use: No    Sexual activity: Not Asked     Other Topics Concern    None     Social History Narrative    None     History   Smoking Status    Current Every Day Smoker    Packs/day: 1.00   Smokeless Tobacco    Not on file     History reviewed. No pertinent family history. ROS: The patient has no difficulty with chest pain or shortness of breath. +fever, chills. Comprehensive review of systems was otherwise unremarkable except as noted above. Physical Exam:   Visit Vitals    /68 (BP 1 Location: Right arm, BP Patient Position: Sitting)    Pulse 94    Temp (!) 101.8 °F (38.8 °C)    Resp 18    Ht 5' 3\" (1.6 m)    Wt 170 lb (77.1 kg)    SpO2 96%    BMI 30.11 kg/m2     Constitutional: Alert, oriented, cooperative patient in no acute distress; appears stated age    Eyes:Sclera are clear. EOMs intact  ENMT: no external lesions gross hearing normal; no obvious neck masses, no ear or lip lesions, nares normal  CV: RRR. Normal perfusion  Resp: No JVD. Breathing is  non-labored; no audible wheezing. CTAB  GI: soft and non-distended ; very tender Bilateral Lower quadrants, greatest in LLQ; palpable hematoma from SQ heparin also in LLQ. Musculoskeletal: unremarkable with normal function. No embolic signs or cyanosis.    Neuro:  Oriented; moves all 4; no focal deficits  Psychiatric: normal affect and mood, no memory impairment    Recent vitals (if inpt):  Patient Vitals for the past 24 hrs:   BP Temp Pulse Resp SpO2   05/04/18 1848 111/68 (!) 101.8 °F (38.8 °C) 94 18 96 %   05/04/18 1623 117/71 99.3 °F (37.4 °C) 84 16 99 %   05/04/18 1046 119/74 98.2 °F (36.8 °C) 82 18 94 %   05/04/18 0648 124/55 98.9 °F (37.2 °C) 92 18 95 %   05/04/18 0445 163/67 98.1 °F (36.7 °C) 72 16 96 %   05/04/18 0112 105/65 98.2 °F (36.8 °C) 73 18 95 %       Labs:  Recent Labs      05/04/18   0526   05/03/18   0652   WBC  10.7   < >  27.2*   HGB  7.9*   < >  9.6*   PLT  392   < >  510*   NA  142   --   139   K  3.5   --   3.6   CL  109*   --   106   CO2  27   --   25   BUN  8   --   10   CREA  0.48*   --   0.65   GLU  105*   --   105*   TBILI  0.2   --   0.4   SGOT  14*   --   14*   ALT  21   --   30   AP  60   --   82   LPSE   --    --   73    < > = values in this interval not displayed. Lab Results   Component Value Date/Time    WBC 10.7 05/04/2018 05:26 AM    HGB 7.9 (L) 05/04/2018 05:26 AM    PLATELET 901 65/53/3446 05:26 AM    Sodium 142 05/04/2018 05:26 AM    Potassium 3.5 05/04/2018 05:26 AM    Chloride 109 (H) 05/04/2018 05:26 AM    CO2 27 05/04/2018 05:26 AM    BUN 8 05/04/2018 05:26 AM    Creatinine 0.48 (L) 05/04/2018 05:26 AM    Glucose 105 (H) 05/04/2018 05:26 AM    Bilirubin, total 0.2 05/04/2018 05:26 AM    AST (SGOT) 14 (L) 05/04/2018 05:26 AM    ALT (SGPT) 21 05/04/2018 05:26 AM    Alk. phosphatase 60 05/04/2018 05:26 AM    Lipase 73 05/03/2018 06:52 AM       I reviewed recent labs and recent radiologic studies. CT Results (most recent):    Results from Hospital Encounter encounter on 05/03/18   CT ABD PELV W CONT   Narrative CT Abdomen and Pelvis:  5/3/2018      Clinical Information: Blood in urine. Lower abdominal pain for one week. Nausea  and vomiting    Comparison CT: None    Standard 5mm axial images of the abdomen and pelvis were obtained. 100 cc Optiray 350 intravenous contrast was used for better evaluation of solid  organs and vascular structures. This study specifically requested, and performed no oral contrast. Please note  that this limits evaluation of bowel. Radiation dose reduction techniques were used for this study.   Our scanners use  one or all of the following:  Automated exposure control, adjustment of the mA  and/or kV according to patient size, iterative reconstruction. CT Abdomen:    Upper images show no infiltrate in either lung base. Spleen is normal. Liver is  enlarged, 21 cm in height. No focal liver mass. Large calcified stones are  present in the gallbladder. Gallbladder is mildly distended. Pancreas is normal.  Adrenals are normal. Kidneys are normal. No hydronephrosis. The abdominal aorta  is normal in size. No adenopathy. CT Pelvis: There is a large uterine fibroid. No pelvic adenopathy. No free fluid. Bowel loops have a grossly normal appearance in the abdomen and pelvis. The  appendix is normal in size. It measures approximately 4.5 mm in diameter. No  infiltration in the periappendiceal fat. Impression Impression:      1. Cholelithiasis. Mild gallbladder distention. 2. Normal-appearing appendix          XR Results (most recent):    Results from Hospital Encounter encounter on 05/03/18   XR CHEST PA LAT   Narrative Chest 2 view dated 5/3/2018    CLINICAL INFORMATION: Leukocytosis    Heart is normal size and mediastinum unremarkable. Lungs clear and pulmonary  vascularity normal. No pleural effusion. Impression IMPRESSION: No acute abnormality            I independently reviewed radiology images for studies I described above or studies I have ordered.    Admission date (for inpatients): 5/3/2018   * No surgery found *  * No surgery found *    ASSESSMENT/PLAN:  Problem List  Never Reviewed          Codes Class Noted    UTI (urinary tract infection) ICD-10-CM: N39.0  ICD-9-CM: 599.0  5/4/2018        Bacterial vaginosis ICD-10-CM: N76.0, B96.89  ICD-9-CM: 616.10, 041.9  5/4/2018        * (Principal)Possible Endometritis ICD-10-CM: N71.9  ICD-9-CM: 615.9  5/4/2018        LLQ pain ICD-10-CM: R10.32  ICD-9-CM: 789.04  5/4/2018        Hematuria ICD-10-CM: R31.9  ICD-9-CM: 599.70  5/4/2018        Cholelithiases ICD-10-CM: K80.20  ICD-9-CM: 574.20  5/3/2018        Leukocytosis ICD-10-CM: F11.699  ICD-9-CM: 288.60  5/3/2018        Pelvic pain ICD-10-CM: R10.2  ICD-9-CM: [id number]  5/3/2018        History of hepatitis C ICD-10-CM: Z86.19  ICD-9-CM: V12.09  5/3/2018            Principal Problem:    Possible Endometritis (5/4/2018)    Active Problems:    Cholelithiases (5/3/2018)      Leukocytosis (5/3/2018)      Pelvic pain (5/3/2018)      History of hepatitis C (5/3/2018)      UTI (urinary tract infection) (5/4/2018)      Bacterial vaginosis (5/4/2018)      LLQ pain (5/4/2018)      Hematuria (5/4/2018)       Plan:  Dr. Khushboo Gipson to evaluate cholelithiasis and determine if/ when cholecystectomy is warranted. Do not see any general surgery issues that are causing her pelvic pain. Await GYN opinion regarding fibroid/pelvic pain. Continue current care per primary team.    Signed:  Edwardo Fregoso MD     I have seen and examined the patient with the Nurse Practitioner and agree with the above assessment and plan. Cholecystectomy not indicated at this time. So far, no acute general surgical issues. She may have some component of gastritis from heavy NSAID use. GI cocktail pending  When I review her CT her uterus is markedly enhances, suggestive of possible endometritis; Defer to GYN.   GC/Chlamydia cultures pending  Clarice Davis MD

## 2018-05-04 NOTE — PROGRESS NOTES
Interdisciplinary team rounds were held 5/4/2018 with the following team members:Care Management, Nursing and Physical Therapy, and pahysician and the patient. Plan of Care options were discussed with the team and the patient.

## 2018-05-04 NOTE — ED NOTES
Patient resting on stretcher with eyes closed and family at bedside. Patient denies having any needs at this time. Will continue to monitor.

## 2018-05-04 NOTE — PROGRESS NOTES
TRANSFER - IN REPORT:    Verbal report received from MARCO Fulton on [facility]  being received from ED for routine progression of care      Report consisted of patients Situation, Background, Assessment and   Recommendations(SBAR). Information from the following report(s) Kardex was reviewed with the receiving nurse. Opportunity for questions and clarification was provided. Assessment completed upon patients arrival to unit and care assumed.

## 2018-05-04 NOTE — ED NOTES
TRANSFER - OUT REPORT:    Verbal report given to [facility] RN on [facility]  being transferred to  for routine progression of care       Report consisted of patients Situation, Background, Assessment and   Recommendations(SBAR). Information from the following report(s) SBAR was reviewed with the receiving nurse. Lines:   Peripheral IV 05/03/18 Left Antecubital (Active)   Site Assessment Clean, dry, & intact 5/3/2018  8:10 AM   Phlebitis Assessment 0 5/3/2018  8:10 AM   Infiltration Assessment 0 5/3/2018  8:10 AM   Dressing Status Clean, dry, & intact 5/3/2018  8:10 AM        Opportunity for questions and clarification was provided.       Patient transported with:  Transport

## 2018-05-05 LAB
ANION GAP SERPL CALC-SCNC: 5 MMOL/L (ref 7–16)
BASOPHILS # BLD: 0 K/UL (ref 0–0.2)
BASOPHILS NFR BLD: 1 % (ref 0–2)
BUN SERPL-MCNC: 7 MG/DL (ref 6–23)
C TRACH RRNA SPEC QL NAA+PROBE: NEGATIVE
CALCIUM SERPL-MCNC: 7.6 MG/DL (ref 8.3–10.4)
CHLORIDE SERPL-SCNC: 109 MMOL/L (ref 98–107)
CO2 SERPL-SCNC: 28 MMOL/L (ref 21–32)
CREAT SERPL-MCNC: 0.5 MG/DL (ref 0.6–1)
DIFFERENTIAL METHOD BLD: ABNORMAL
EOSINOPHIL # BLD: 0.2 K/UL (ref 0–0.8)
EOSINOPHIL NFR BLD: 3 % (ref 0.5–7.8)
ERYTHROCYTE [DISTWIDTH] IN BLOOD BY AUTOMATED COUNT: 15.1 % (ref 11.9–14.6)
GLUCOSE SERPL-MCNC: 78 MG/DL (ref 65–100)
HCT VFR BLD AUTO: 25 % (ref 35.8–46.3)
HGB BLD-MCNC: 7.8 G/DL (ref 11.7–15.4)
IMM GRANULOCYTES # BLD: 0 K/UL (ref 0–0.5)
IMM GRANULOCYTES NFR BLD AUTO: 0 % (ref 0–5)
LYMPHOCYTES # BLD: 1.7 K/UL (ref 0.5–4.6)
LYMPHOCYTES NFR BLD: 25 % (ref 13–44)
MCH RBC QN AUTO: 25.4 PG (ref 26.1–32.9)
MCHC RBC AUTO-ENTMCNC: 31.2 G/DL (ref 31.4–35)
MCV RBC AUTO: 81.4 FL (ref 79.6–97.8)
MONOCYTES # BLD: 0.4 K/UL (ref 0.1–1.3)
MONOCYTES NFR BLD: 7 % (ref 4–12)
N GONORRHOEA RRNA SPEC QL NAA+PROBE: NEGATIVE
NEUTS SEG # BLD: 4.3 K/UL (ref 1.7–8.2)
NEUTS SEG NFR BLD: 64 % (ref 43–78)
PLATELET # BLD AUTO: 433 K/UL (ref 150–450)
PMV BLD AUTO: 9.2 FL (ref 10.8–14.1)
POTASSIUM SERPL-SCNC: 3.8 MMOL/L (ref 3.5–5.1)
RBC # BLD AUTO: 3.07 M/UL (ref 4.05–5.25)
SODIUM SERPL-SCNC: 142 MMOL/L (ref 136–145)
SPECIMEN SOURCE: NORMAL
WBC # BLD AUTO: 6.6 K/UL (ref 4.3–11.1)

## 2018-05-05 PROCEDURE — 74011250636 HC RX REV CODE- 250/636: Performed by: FAMILY MEDICINE

## 2018-05-05 PROCEDURE — 85025 COMPLETE CBC W/AUTO DIFF WBC: CPT | Performed by: FAMILY MEDICINE

## 2018-05-05 PROCEDURE — 76937 US GUIDE VASCULAR ACCESS: CPT

## 2018-05-05 PROCEDURE — 74011250637 HC RX REV CODE- 250/637: Performed by: FAMILY MEDICINE

## 2018-05-05 PROCEDURE — 99218 HC RM OBSERVATION: CPT

## 2018-05-05 PROCEDURE — 36415 COLL VENOUS BLD VENIPUNCTURE: CPT | Performed by: FAMILY MEDICINE

## 2018-05-05 PROCEDURE — 80048 BASIC METABOLIC PNL TOTAL CA: CPT | Performed by: FAMILY MEDICINE

## 2018-05-05 PROCEDURE — 74011000258 HC RX REV CODE- 258: Performed by: FAMILY MEDICINE

## 2018-05-05 RX ORDER — CIPROFLOXACIN 500 MG/1
500 TABLET ORAL EVERY 12 HOURS
Status: DISCONTINUED | OUTPATIENT
Start: 2018-05-05 | End: 2018-05-08

## 2018-05-05 RX ORDER — KETOROLAC TROMETHAMINE 15 MG/ML
15 INJECTION, SOLUTION INTRAMUSCULAR; INTRAVENOUS
Status: DISCONTINUED | OUTPATIENT
Start: 2018-05-05 | End: 2018-05-05

## 2018-05-05 RX ORDER — SERTRALINE HYDROCHLORIDE 100 MG/1
100 TABLET, FILM COATED ORAL DAILY
COMMUNITY
End: 2018-05-21 | Stop reason: SDUPTHER

## 2018-05-05 RX ORDER — TRAZODONE HYDROCHLORIDE 150 MG/1
150 TABLET ORAL
COMMUNITY
End: 2018-05-21 | Stop reason: SDUPTHER

## 2018-05-05 RX ADMIN — Medication 10 ML: at 21:23

## 2018-05-05 RX ADMIN — KETOROLAC TROMETHAMINE 15 MG: 15 INJECTION, SOLUTION INTRAMUSCULAR; INTRAVENOUS at 11:24

## 2018-05-05 RX ADMIN — METRONIDAZOLE 500 MG: 500 TABLET ORAL at 20:33

## 2018-05-05 RX ADMIN — Medication 30 ML: at 11:24

## 2018-05-05 RX ADMIN — Medication 30 ML: at 05:53

## 2018-05-05 RX ADMIN — Medication 10 ML: at 05:50

## 2018-05-05 RX ADMIN — MORPHINE SULFATE 1 MG: 4 INJECTION, SOLUTION INTRAMUSCULAR; INTRAVENOUS at 17:26

## 2018-05-05 RX ADMIN — METRONIDAZOLE 500 MG: 500 TABLET ORAL at 08:13

## 2018-05-05 RX ADMIN — CIPROFLOXACIN 500 MG: 500 TABLET, FILM COATED ORAL at 11:24

## 2018-05-05 RX ADMIN — PANTOPRAZOLE SODIUM 40 MG: 40 TABLET, DELAYED RELEASE ORAL at 05:50

## 2018-05-05 RX ADMIN — Medication 10 ML: at 14:01

## 2018-05-05 RX ADMIN — CIPROFLOXACIN 500 MG: 500 TABLET, FILM COATED ORAL at 20:33

## 2018-05-05 RX ADMIN — HYDROCODONE BITARTRATE AND ACETAMINOPHEN 1 TABLET: 5; 325 TABLET ORAL at 08:13

## 2018-05-05 RX ADMIN — HYDROCODONE BITARTRATE AND ACETAMINOPHEN 1 TABLET: 5; 325 TABLET ORAL at 20:33

## 2018-05-05 RX ADMIN — MORPHINE SULFATE 1 MG: 4 INJECTION, SOLUTION INTRAMUSCULAR; INTRAVENOUS at 05:49

## 2018-05-05 RX ADMIN — PIPERACILLIN SODIUM,TAZOBACTAM SODIUM 3.38 G: 3; .375 INJECTION, POWDER, FOR SOLUTION INTRAVENOUS at 03:16

## 2018-05-05 RX ADMIN — Medication 30 ML: at 00:33

## 2018-05-05 NOTE — PROGRESS NOTES
H&P/Consult Note/Progress Note/Office Note:   Lorrie Knight  MRN: [id number]  :1980  Age:37 y.o.    HPI: Lorrie Knight is a 40 y.o. female who has a PMHx of Hep C and admit drug screen + for [facility]   who presented to ED today with severe lower abdominal/pelvic pain  X 1 week, mostly in the LLQ and suprapubic area. General surgery was asked to evaluate. She had associated N/V. She also reported hematuria. LMP last week. Chronic constipation. WBC 27 initially, now down to 10.7. Hgb 9.6. Pt had US which demonstrated gallstones and distention, CBD 8mm, no cholecystitis. t bili 0.2. LFTs WNL. She endorses a 1mo h/o pelvic pain which has worsened. She has taken a large amount of ibuprofen with no improvement in pain. She has had some vaginal bleeding. She describes her pelvic pain as burning and severe. She has associated N/V. She reports occasional mild RUQ pain with bloating after eating. This is not bothering her right now. She is typically very constipated. + fevers and chills, nights sweats. Denies weight loss. 18 CT abd/pelvis with IV contrast  Hx: Blood in urine. Lower abdominal pain for one week. N/V    Upper images show no infiltrate in either lung base. Spleen is normal. Liver is enlarged, 21 cm in height. No focal liver mass. Large calcified stones are present in the gallbladder. Gallbladder is mildly distended. Pancreas is normal.  Adrenals are normal. Kidneys are normal. No hydronephrosis. The abdominal aorta is normal in size. No adenopathy.     CT Pelvis: There is a large uterine fibroid. No pelvic adenopathy. No free fluid. Bowel loops have a grossly normal appearance in the abdomen and pelvis. The  appendix is normal in size. It measures approximately 4.5 mm in diameter. No infiltration in the periappendiceal fat.       Impression:  1. Cholelithiasis. Mild gallbladder distention.   2. Normal-appearing appendix         5/3/18 Pelvic U/S  Hx: Severe pelvic pain for 2 weeks  Transabdominal and transvaginal images. The uterus measures 13 x 6.8 x 8.5 cm. There is a 7.4 x 6.9 cm fibroid in the body of the uterus. Endometrial stripe is normal, 7 mm. The ovaries were not visualized. No adnexal mass or free fluid. IMPRESSION: Uterine fibroid      5/3/18 RUQ U/S  Liver is enlarged measuring 20.8 cm in height. No liver mass. No intrahepatic bile duct distention. CBD mildly prominent, 8 mm. Gallbladder is distended. No wall thickening. Multiple shadowing stones are present. Pancreas is normal. Right kidney is normal. No hydronephrosis. Abdominal aorta is normal distally, 1.7 cm. IVC patent. IMPRESSION:  1. Cholelithiasis. Mild gallbladder distention.   2. Hepatomegaly    5/3/18 CXR, 2 views  Hx: Leukocytosis  IMPRESSION: No acute abnormality    5/5/18 pelvic pain persists and not well controlled so far; GC/Chlamydia pending; she didn't notice any changes with GI cocktail yesterday         Past Medical History:   Diagnosis Date    Gall stones     Hepatitis C      Past Surgical History:   Procedure Laterality Date    BREAST SURGERY PROCEDURE UNLISTED      abscess    HX DILATION AND CURETTAGE       Current Facility-Administered Medications   Medication Dose Route Frequency    ketorolac (TORADOL) injection 15 mg  15 mg IntraMUSCular Q8H PRN    ciprofloxacin HCl (CIPRO) tablet 500 mg  500 mg Oral Q12H    pantoprazole (PROTONIX) tablet 40 mg  40 mg Oral ACB    morphine injection 1 mg  1 mg IntraVENous Q4H PRN    GI Cocktail  30 mL Oral Q6H    metroNIDAZOLE (FLAGYL) tablet 500 mg  500 mg Oral Q12H    sodium chloride (NS) flush 5-10 mL  5-10 mL IntraVENous Q8H    sodium chloride (NS) flush 5-10 mL  5-10 mL IntraVENous PRN    acetaminophen (TYLENOL) tablet 650 mg  650 mg Oral Q4H PRN    HYDROcodone-acetaminophen (NORCO) 5-325 mg per tablet 1 Tab  1 Tab Oral Q4H PRN    ondansetron (ZOFRAN) injection 4 mg  4 mg IntraVENous Q4H PRN    heparin (porcine) injection 5,000 Units  5,000 Units SubCUTAneous Q8H     Review of patient's allergies indicates no known allergies. Social History     Social History    Marital status:      Spouse name: N/A    Number of children: N/A    Years of education: N/A     Social History Main Topics    Smoking status: Current Every Day Smoker     Packs/day: 1.00    Smokeless tobacco: None    Alcohol use No    Drug use: No    Sexual activity: Not Asked     Other Topics Concern    None     Social History Narrative    None     History   Smoking Status    Current Every Day Smoker    Packs/day: 1.00   Smokeless Tobacco    Not on file     History reviewed. No pertinent family history. ROS: The patient has no difficulty with chest pain or shortness of breath. +fever, chills. Comprehensive review of systems was otherwise unremarkable except as noted above. Physical Exam:   Visit Vitals    /76    Pulse 66    Temp 98.2 °F (36.8 °C)    Resp 16    Ht 5' 3\" (1.6 m)    Wt 170 lb (77.1 kg)    SpO2 99%    BMI 30.11 kg/m2     Constitutional: Alert, oriented, cooperative patient in no acute distress; appears stated age    Eyes:Sclera are clear. EOMs intact  ENMT: no external lesions gross hearing normal; no obvious neck masses, no ear or lip lesions, nares normal  CV: RRR. Normal perfusion  Resp: No JVD. Breathing is  non-labored; no audible wheezing. CTAB  GI: soft and non-distended; no ruq tenderness; very tender lower abd and suprapubic region  Musculoskeletal: unremarkable with normal function. No embolic signs or cyanosis.    Neuro:  Oriented; moves all 4; no focal deficits  Psychiatric: normal affect and mood, no memory impairment    Recent vitals (if inpt):  Patient Vitals for the past 24 hrs:   BP Temp Pulse Resp SpO2   05/05/18 1045 130/76 98.2 °F (36.8 °C) 66 16 99 %   05/05/18 0646 125/77 98.3 °F (36.8 °C) 80 16 100 %   05/05/18 0512 113/72 97.9 °F (36.6 °C) 69 16 99 %   05/04/18 2339 102/64 98.6 °F (37 °C) 80 16 97 % 05/04/18 1848 111/68 (!) 101.8 °F (38.8 °C) 94 18 96 %   05/04/18 1623 117/71 99.3 °F (37.4 °C) 84 16 99 %       Labs:  Recent Labs      05/05/18   0549  05/04/18   0526   05/03/18   0652   WBC  6.6  10.7   < >  27.2*   HGB  7.8*  7.9*   < >  9.6*   PLT  433  392   < >  510*   NA  142  142   --   139   K  3.8  3.5   --   3.6   CL  109*  109*   --   106   CO2  28  27   --   25   BUN  7  8   --   10   CREA  0.50*  0.48*   --   0.65   GLU  78  105*   --   105*   TBILI   --   0.2   --   0.4   SGOT   --   14*   --   14*   ALT   --   21   --   30   AP   --   60   --   82   LPSE   --    --    --   73    < > = values in this interval not displayed. Lab Results   Component Value Date/Time    WBC 6.6 05/05/2018 05:49 AM    HGB 7.8 (L) 05/05/2018 05:49 AM    PLATELET 493 00/80/7377 05:49 AM    Sodium 142 05/05/2018 05:49 AM    Potassium 3.8 05/05/2018 05:49 AM    Chloride 109 (H) 05/05/2018 05:49 AM    CO2 28 05/05/2018 05:49 AM    BUN 7 05/05/2018 05:49 AM    Creatinine 0.50 (L) 05/05/2018 05:49 AM    Glucose 78 05/05/2018 05:49 AM    Bilirubin, total 0.2 05/04/2018 05:26 AM    AST (SGOT) 14 (L) 05/04/2018 05:26 AM    ALT (SGPT) 21 05/04/2018 05:26 AM    Alk. phosphatase 60 05/04/2018 05:26 AM    Lipase 73 05/03/2018 06:52 AM       I reviewed recent labs and recent radiologic studies. CT Results (most recent):    Results from Hospital Encounter encounter on 05/03/18   CT ABD PELV W CONT   Narrative CT Abdomen and Pelvis:  5/3/2018      Clinical Information: Blood in urine. Lower abdominal pain for one week. Nausea  and vomiting    Comparison CT: None    Standard 5mm axial images of the abdomen and pelvis were obtained. 100 cc Optiray 350 intravenous contrast was used for better evaluation of solid  organs and vascular structures. This study specifically requested, and performed no oral contrast. Please note  that this limits evaluation of bowel. Radiation dose reduction techniques were used for this study. Our scanners use  one or all of the following:  Automated exposure control, adjustment of the mA  and/or kV according to patient size, iterative reconstruction. CT Abdomen:    Upper images show no infiltrate in either lung base. Spleen is normal. Liver is  enlarged, 21 cm in height. No focal liver mass. Large calcified stones are  present in the gallbladder. Gallbladder is mildly distended. Pancreas is normal.  Adrenals are normal. Kidneys are normal. No hydronephrosis. The abdominal aorta  is normal in size. No adenopathy. CT Pelvis: There is a large uterine fibroid. No pelvic adenopathy. No free fluid. Bowel loops have a grossly normal appearance in the abdomen and pelvis. The  appendix is normal in size. It measures approximately 4.5 mm in diameter. No  infiltration in the periappendiceal fat. Impression Impression:      1. Cholelithiasis. Mild gallbladder distention. 2. Normal-appearing appendix          XR Results (most recent):    Results from Hospital Encounter encounter on 05/03/18   XR CHEST PA LAT   Narrative Chest 2 view dated 5/3/2018    CLINICAL INFORMATION: Leukocytosis    Heart is normal size and mediastinum unremarkable. Lungs clear and pulmonary  vascularity normal. No pleural effusion. Impression IMPRESSION: No acute abnormality            I independently reviewed radiology images for studies I described above or studies I have ordered.    Admission date (for inpatients): 5/3/2018   * No surgery found *  * No surgery found *    ASSESSMENT/PLAN:  Problem List  Never Reviewed          Codes Class Noted    UTI (urinary tract infection) ICD-10-CM: N39.0  ICD-9-CM: 599.0  5/4/2018        Bacterial vaginosis ICD-10-CM: N76.0, B96.89  ICD-9-CM: 616.10, 041.9  5/4/2018        * (Principal)Possible Endometritis ICD-10-CM: N71.9  ICD-9-CM: 615.9  5/4/2018        LLQ pain ICD-10-CM: R10.32  ICD-9-CM: 789.04  5/4/2018        Hematuria ICD-10-CM: R31.9  ICD-9-CM: 599.70  5/4/2018        Cholelithiases ICD-10-CM: K80.20  ICD-9-CM: 574.20  5/3/2018        Leukocytosis ICD-10-CM: I20.491  ICD-9-CM: 288.60  5/3/2018        Pelvic pain ICD-10-CM: R10.2  ICD-9-CM: [id number]  5/3/2018        History of hepatitis C ICD-10-CM: Z86.19  ICD-9-CM: V12.09  5/3/2018            Principal Problem:    Possible Endometritis (5/4/2018)    Active Problems:    Cholelithiases (5/3/2018)      Leukocytosis (5/3/2018)      Pelvic pain (5/3/2018)      History of hepatitis C (5/3/2018)      UTI (urinary tract infection) (5/4/2018)      Bacterial vaginosis (5/4/2018)      LLQ pain (5/4/2018)      Hematuria (5/4/2018)       Plan:    Asymptomatic gallstones  Cholecystectomy not indicated at this time. No upper abd pain at present    She likely has some component of gastritis from heavy NSAID use--->IV Pepcid q12hrs   Toradol is not a good option for her at this time (discontinue)    When I review her CT her uterus markedly enhances, suggestive of possible endometritis; Defer to GYN but looks like endometritis should be high on differential.   GC/Chlamydia cultures pending  IV Zosyn    Do not see any general surgery issues that are causing her pelvic pain at this time.         Signed:  Juan Vargas MD

## 2018-05-05 NOTE — CONSULTS
Gynecology Consult    Name: Janina Wilburn MRN: [id number] SSN: [id number]    YOB: 1980  Age: 40 y.o. Sex: female       Subjective:      Chief complaint:  Pelvic pain    Brian Murguia is a 40 y.o.  female with a history of 3-4 wks of worsening pelvic pain, mostly suprapubic pain and cramping now with one episode of vaginal bleeding last night with nitrite pos UTI, urine culture pending. Pt has had abd pain as well and was found ot have a gallstone on CT a&p. Ultrasound shows an enlarged uterus with probable fibroid that measures 7+cm, BL ovaries were not seen on TA and TV views. No adnexal masses were noted and no free fluid. Pt reports intermittent fevers as well. Pt on zosyn and IV pain medications and continues to report pain 9/10. [id number] : G1 () reportedly was a 23wk IUFD for which she reports having a D&E instead of IOL. G2 () 42wk  of 5#4 ounce female infant, denies complications. G3 () 42 wk  of 7#15 oz female infant. Pt reports she has HCV, she recently was released from senior living in [place] and was incarcerated for crack cocaine charges, she had more than a gram in her possession. She denies that any of this pain was present during her correction time. She has not had prior imaging of her uterus. She does feel that her abdomen has felt bloated for more than a few months. Denies hx of STDs but reports frequent UTIs. She states she used to see [facility] and did have abnormal paps during her pregnancies but they \"cleared up on their own. \" She does not think she has had a pap smear since at least [id number]. She denies irregular menstrual bleeding but does report heavy painful cycles with large clots. Had her period last week and had intense pain with it. Has taken 1-2 bottles of ibuprofen in the past week and felt that it wasn't touching her pain.     Contraception: None  Last pap: ?2006        Past Medical History:   Diagnosis Date    Gall stones     Hepatitis C Past Surgical History:   Procedure Laterality Date    BREAST SURGERY PROCEDURE UNLISTED      abscess    HX DILATION AND CURETTAGE       OB History     No data available        No Known Allergies  Current Facility-Administered Medications   Medication Dose Route Frequency Provider Last Rate Last Dose    pantoprazole (PROTONIX) tablet 40 mg  40 mg Oral ACB Azeem Oquendo MD   40 mg at 05/04/18 0856    morphine injection 1 mg  1 mg IntraVENous Q4H PRN Lawrence Tan MD   1 mg at 05/04/18 2236    GI Cocktail  30 mL Oral Q6H Lawrence Tan MD   30 mL at 05/04/18 1739    metroNIDAZOLE (FLAGYL) tablet 500 mg  500 mg Oral Q12H Lawrence Tan MD   500 mg at 05/04/18 2059    sodium chloride (NS) flush 5-10 mL  5-10 mL IntraVENous Q8H Azeem Oquendo MD   10 mL at 05/04/18 2100    sodium chloride (NS) flush 5-10 mL  5-10 mL IntraVENous PRN Azeem Oquendo MD        acetaminophen (TYLENOL) tablet 650 mg  650 mg Oral Q4H PRN Azeem Oquendo MD   650 mg at 05/04/18 2059    HYDROcodone-acetaminophen (NORCO) 5-325 mg per tablet 1 Tab  1 Tab Oral Q4H PRN Azeem Oquendo MD   1 Tab at 05/04/18 2059    ondansetron (ZOFRAN) injection 4 mg  4 mg IntraVENous Q4H PRN Azeem Oquendo MD   4 mg at 05/03/18 1901    heparin (porcine) injection 5,000 Units  5,000 Units SubCUTAneous Q8H Azeem Oquendo MD   5,000 Units at 05/04/18 2101    0.9% sodium chloride infusion  75 mL/hr IntraVENous CONTINUOUS Lawrence Tan MD 75 mL/hr at 05/04/18 2057 75 mL/hr at 05/04/18 2057    piperacillin-tazobactam (ZOSYN) 3.375 g in 0.9% sodium chloride (MBP/ADV) 100 mL  3.375 g IntraVENous Q8H Azeem Oquendo MD 25 mL/hr at 05/04/18 2058 3.375 g at 05/04/18 2058       History reviewed. No pertinent family history. Social History     Social History    Marital status:      Spouse name: N/A    Number of children: N/A    Years of education: N/A     Occupational History    Not on file.      Social History Main Topics    Smoking status: Current Every Day Smoker     Packs/day: 1.00    Smokeless tobacco: Not on file    Alcohol use No    Drug use: No    Sexual activity: Not on file     Other Topics Concern    Not on file     Social History Narrative    No narrative on file       Review of Systems:  A comprehensive review of systems was negative except for that written in the History of Present Illness. Objective:     Vitals:    05/04/18 0648 05/04/18 1046 05/04/18 1623 05/04/18 1848   BP: 124/55 119/74 117/71 111/68   Pulse: 92 82 84 94   Resp: 18 18 16 18   Temp: 98.9 °F (37.2 °C) 98.2 °F (36.8 °C) 99.3 °F (37.4 °C) (!) 101.8 °F (38.8 °C)   SpO2: 95% 94% 99% 96%   Weight:       Height:           General:  alert, cooperative, appears distressed and is unable sit sit still, appears older than stated age, pale, somewhat of a poor historian    Skin:  Normal.   HEENT: NCAT   Lungs:  Normal respiratory effort    Heart:  Mildly tachycardic   Abdomen: Guarding present in RLQ and midline suprapubic area on palpation, no rebound, pt unable to fully relax or stop moving to undergo exam       Genitourinary: Poorly tolerant of speculum exam thus cervix not fully visualized. Small dark clotted blood present in posterior vaginal vault. No lesions in vault or on perineum. On bimanual exam, pt is exquisitely tender to palpation of the cervix and +CMT. No over mass of the cervix was noted, although the cervix palpates somewhat irregular, it is not barrel-shaped. Uterus palpates enlarged consistent with large fibroid. Pt unable to tolerate palpation of the uterus and could not remain still or relax. Extremities:  extremities normal, atraumatic, no cyanosis or edema   Neurologic:  Gait normal when observed walkiing to and from the restroom multiple times. Psychiatric:  anxious, difficult to follow      Assessment:     Pelvic pain with Uterine fibroid.       Plan:     Patient's pain appears significantly out of proportion to her findings on imaging and physical exam. Possible that she could have a degenerating fibroid which could cause low-grade fever and significant pain. Would be managed with NSAIDs as the pain would be self-limiting but could last days to weeks. Her pain appears worse when the uterus and fibroid are palpated directly on exam. Could consider MRI of the pelvis to further evaluate for degenerative areas within the fibroid. With this being her first imaging, it is impossible to know whether this fibroid has rapidly grown to its current size thus the remote possibility of this being a leiomyosarcoma was discussed with the patient. Discussed that repeating her imaging at some interval in the office would be necessary to see whether this fibroid has grown rapidly over a period of time. She expressed understanding. She may call our office to schedule. She denies hx of HSV or any other STDs that would give her her vaginal and supra pubic pain complaints. GC/CT pending from exam in the ER. She did have abnormal UA, and her wet prep showed clue cells. Would use flagyl to treat for BV. Discussed with her that I have no definitive etiology for her pain of this severity. Discussed that given that she had no adnexal masses noted on ultrasound other than this uterine fibroid, I am reassured that she doesn't have any adnexal masses. No free fluid either. UPT has been negative. She does have a history of chronic constipation but states that this is different pain than her constipation pain. Recommend trying to get pt off IV pain medication and transition to oral medication for pain control and follow up outpatient. Greater than 50% of 25 minutes spent on face to face counseling, education, and examining the patient regarding her exam findings, indications for further tests, and discussion of assessment and plan as stated above.         Signed By:  Hannah Guzmán MD     May 4, 2018

## 2018-05-05 NOTE — PROGRESS NOTES
Two attempts to start PIV. Patient is experiencing pain and has requested pain medicine. Requested the meds to be given IM and we will re-attempt IV or place a midline.

## 2018-05-05 NOTE — PROGRESS NOTES
Hospitalist Progress Note     Admit Date:  5/3/2018  6:55 AM   Name:  Coni Krishna   Age:  40 y.o.  :  1980   MRN:  [id number]   PCP:  None  Treatment Team: Attending Provider: Stan Wasserman MD; Utilization Review: Hubert Maxwell RN; Consulting Provider: Rasheed Sands MD; Consulting Provider: Aria Tobias MD; Utilization Review: Odell Lawler RN    Subjective:   46yo F with PMH of Hepatitis C presents with moderate to severe abdominal pain x1 week. She reports pain is mostly in lower abdomen and right quadrants and is associated with intermittent nausea and emesis. She denies diarrhea but reports chronic constipation. She has been using large quantities of Ibuprofen for pain control since last week. LMP was last week and she reports menses typically heavy with clots. She states this pain is different from menstrual cramps. In the ER, she is afebrile and normotensive. Her labs are pertinent for :  WBC 27K, HGB 9.6 and are otherwise insignificant.     18  Family at bedside  C/o lower abd pain  Wants to eat    18  C/o lower abd pain  No iv access  Gyn and surgery - no surgical indication    Objective:     Patient Vitals for the past 24 hrs:   Temp Pulse Resp BP SpO2   18 1454 - 64 16 110/60 98 %   18 1045 98.2 °F (36.8 °C) 66 16 130/76 99 %   18 0646 98.3 °F (36.8 °C) 80 16 125/77 100 %   18 0512 97.9 °F (36.6 °C) 69 16 113/72 99 %   18 2339 98.6 °F (37 °C) 80 16 102/64 97 %   18 1848 (!) 101.8 °F (38.8 °C) 94 18 111/68 96 %   18 1623 99.3 °F (37.4 °C) 84 16 117/71 99 %     Oxygen Therapy  O2 Sat (%): 98 % (18 1454)  Pulse via Oximetry: 86 beats per minute (18 2206)  O2 Device: Room air (18 2212)    Intake/Output Summary (Last 24 hours) at 18 1528  Last data filed at 18 1012   Gross per 24 hour   Intake              120 ml   Output                0 ml   Net              120 ml         General:    Well nourished. Alert.    heent- normal  CV:   RRR. No murmur, rub, or gallop. Lungs:   Clear to auscultation bilaterally. No wheezing, rhonchi, or rales. Abdomen:   Suprapubic and rt lower quadrant tender,mild guarding, no rebound. Cns- normal  Extremities: Warm and dry. No cyanosis or edema. Skin:     No rashes or jaundice. Data Review:  I have reviewed all labs, meds, telemetry events, and studies from the last 24 hours. Recent Results (from the past 24 hour(s))   METABOLIC PANEL, BASIC    Collection Time: 05/05/18  5:49 AM   Result Value Ref Range    Sodium 142 136 - 145 mmol/L    Potassium 3.8 3.5 - 5.1 mmol/L    Chloride 109 (H) 98 - 107 mmol/L    CO2 28 21 - 32 mmol/L    Anion gap 5 (L) 7 - 16 mmol/L    Glucose 78 65 - 100 mg/dL    BUN 7 6 - 23 MG/DL    Creatinine 0.50 (L) 0.6 - 1.0 MG/DL    GFR est AA >60 >60 ml/min/1.73m2    GFR est non-AA >60 >60 ml/min/1.73m2    Calcium 7.6 (L) 8.3 - 10.4 MG/DL   CBC WITH AUTOMATED DIFF    Collection Time: 05/05/18  5:49 AM   Result Value Ref Range    WBC 6.6 4.3 - 11.1 K/uL    RBC 3.07 (L) 4.05 - 5.25 M/uL    HGB 7.8 (L) 11.7 - 15.4 g/dL    HCT 25.0 (L) 35.8 - 46.3 %    MCV 81.4 79.6 - 97.8 FL    MCH 25.4 (L) 26.1 - 32.9 PG    MCHC 31.2 (L) 31.4 - 35.0 g/dL    RDW 15.1 (H) 11.9 - 14.6 %    PLATELET 111 002 - 176 K/uL    MPV 9.2 (L) 10.8 - 14.1 FL    DF AUTOMATED      NEUTROPHILS 64 43 - 78 %    LYMPHOCYTES 25 13 - 44 %    MONOCYTES 7 4.0 - 12.0 %    EOSINOPHILS 3 0.5 - 7.8 %    BASOPHILS 1 0.0 - 2.0 %    IMMATURE GRANULOCYTES 0 0.0 - 5.0 %    ABS. NEUTROPHILS 4.3 1.7 - 8.2 K/UL    ABS. LYMPHOCYTES 1.7 0.5 - 4.6 K/UL    ABS. MONOCYTES 0.4 0.1 - 1.3 K/UL    ABS. EOSINOPHILS 0.2 0.0 - 0.8 K/UL    ABS. BASOPHILS 0.0 0.0 - 0.2 K/UL    ABS. IMM.  GRANS. 0.0 0.0 - 0.5 K/UL        All Micro Results     Procedure Component Value Units Date/Time    CULTURE, URINE [[id number]] Collected:  05/03/18 2022    Order Status:  Completed Specimen:  Urine from Clean catch Updated: 05/05/18 0740     Special Requests: NO SPECIAL REQUESTS        Culture result:       <10,000  COLONIES/mL  NORMAL SKIN FAHAD ISOLATED      INFLUENZA A & B AG (RAPID TEST) [[id number]] Collected:  05/03/18 1553    Order Status:  Completed Specimen:  Nasopharyngeal from Nasal washing Updated:  05/03/18 1619     Influenza A Ag NEGATIVE          NEGATIVE FOR THE PRESENCE OF INFLUENZA A ANTIGEN  INFECTION DUE TO INFLUENZA A CANNOT BE RULED OUT. BECAUSE THE ANTIGEN PRESENT IN THE SAMPLE MAY BE BELOW  THE DETECTION LIMIT OF THE TEST. A NEGATIVE TEST IS PRESUMPTIVE AND IT IS RECOMMENDED THAT THESE RESULTS BE CONFIRMED BY VIRAL CULTURE OR AN FDA-CLEARED INFLUENZA A AND B MOLECULAR ASSAY. Influenza B Ag NEGATIVE          NEGATIVE FOR THE PRESENCE OF INFLUENZA B ANTIGEN  INFECTION DUE TO INFLUENZA B CANNOT BE RULED OUT. BECAUSE THE ANTIGEN PRESENT IN THE SAMPLE MAY BE BELOW  THE DETECTION LIMIT OF THE TEST. A NEGATIVE TEST IS PRESUMPTIVE AND IT IS RECOMMENDED THAT THESE RESULTS BE CONFIRMED BY VIRAL CULTURE OR AN FDA-CLEARED INFLUENZA A AND B MOLECULAR ASSAY.          WET PREP [[id number]] Collected:  05/03/18 0934    Order Status:  Completed Specimen:  Vagina Updated:  05/03/18 0952     Special Requests: NO SPECIAL REQUESTS        Wet prep NO WBCS SEEN        Wet prep NO YEAST SEEN        Wet prep NO TRICHOMONAS SEEN        Wet prep --        FEW  CLUE CELLS PRESENT            Current Meds:  Current Facility-Administered Medications   Medication Dose Route Frequency    ciprofloxacin HCl (CIPRO) tablet 500 mg  500 mg Oral Q12H    pantoprazole (PROTONIX) tablet 40 mg  40 mg Oral ACB    morphine injection 1 mg  1 mg IntraVENous Q4H PRN    metroNIDAZOLE (FLAGYL) tablet 500 mg  500 mg Oral Q12H    sodium chloride (NS) flush 5-10 mL  5-10 mL IntraVENous Q8H    sodium chloride (NS) flush 5-10 mL  5-10 mL IntraVENous PRN    acetaminophen (TYLENOL) tablet 650 mg  650 mg Oral Q4H PRN    HYDROcodone-acetaminophen (NORCO) 5-325 mg per tablet 1 Tab  1 Tab Oral Q4H PRN    ondansetron (ZOFRAN) injection 4 mg  4 mg IntraVENous Q4H PRN    heparin (porcine) injection 5,000 Units  5,000 Units SubCUTAneous Q8H       Other Studies (last 24 hours):  No results found. Assessment and Plan:     Hospital Problems as of 5/4/2018  Never Reviewed          Codes Class Noted - Resolved POA    Cholelithiases ICD-10-CM: K80.20  ICD-9-CM: 574.20  5/3/2018 - Present Unknown        Leukocytosis ICD-10-CM: D72.829  ICD-9-CM: 288.60  5/3/2018 - Present Unknown        * (Principal)Abdominal pain ICD-10-CM: R10.9  ICD-9-CM: 789.00  5/3/2018 - Present Unknown        History of hepatitis C ICD-10-CM: Z86.19  ICD-9-CM: V12.09  5/3/2018 - Present Unknown    Uti,bacterai vaginosis          PLAN:    abd pain- suprapubic and rt lower-?etiology- ct and pelvic ultrasound beningn findings-surgery- no surgical indication. gynecologist felt the pain from fibroids.   uti- on zosyn  Bacterial vaginosis- flagyl  Hept c  cholelithiasis    DC planning/Dispo:    DVT ppx:  heparin    Signed:  Chauncey Worrell MD

## 2018-05-06 ENCOUNTER — APPOINTMENT (OUTPATIENT)
Dept: MRI IMAGING | Age: 38
DRG: 742 | End: 2018-05-06
Attending: FAMILY MEDICINE
Payer: SUBSIDIZED

## 2018-05-06 PROBLEM — D25.1 INTRAMURAL LEIOMYOMA OF UTERUS: Status: ACTIVE | Noted: 2018-05-06

## 2018-05-06 PROBLEM — R10.9 ABDOMINAL PAIN: Status: ACTIVE | Noted: 2018-05-06

## 2018-05-06 LAB
BACTERIA SPEC CULT: NORMAL
BASOPHILS # BLD: 0 K/UL (ref 0–0.2)
BASOPHILS NFR BLD: 1 % (ref 0–2)
DIFFERENTIAL METHOD BLD: ABNORMAL
EOSINOPHIL # BLD: 0.2 K/UL (ref 0–0.8)
EOSINOPHIL NFR BLD: 2 % (ref 0.5–7.8)
ERYTHROCYTE [DISTWIDTH] IN BLOOD BY AUTOMATED COUNT: 14.7 % (ref 11.9–14.6)
HCT VFR BLD AUTO: 27.4 % (ref 35.8–46.3)
HGB BLD-MCNC: 8.7 G/DL (ref 11.7–15.4)
IMM GRANULOCYTES # BLD: 0 K/UL (ref 0–0.5)
IMM GRANULOCYTES NFR BLD AUTO: 0 % (ref 0–5)
LYMPHOCYTES # BLD: 1.5 K/UL (ref 0.5–4.6)
LYMPHOCYTES NFR BLD: 18 % (ref 13–44)
MCH RBC QN AUTO: 25.6 PG (ref 26.1–32.9)
MCHC RBC AUTO-ENTMCNC: 31.8 G/DL (ref 31.4–35)
MCV RBC AUTO: 80.6 FL (ref 79.6–97.8)
MONOCYTES # BLD: 0.4 K/UL (ref 0.1–1.3)
MONOCYTES NFR BLD: 5 % (ref 4–12)
NEUTS SEG # BLD: 6.2 K/UL (ref 1.7–8.2)
NEUTS SEG NFR BLD: 74 % (ref 43–78)
PLATELET # BLD AUTO: 495 K/UL (ref 150–450)
PMV BLD AUTO: 9.1 FL (ref 10.8–14.1)
RBC # BLD AUTO: 3.4 M/UL (ref 4.05–5.25)
SERVICE CMNT-IMP: NORMAL
WBC # BLD AUTO: 8.4 K/UL (ref 4.3–11.1)

## 2018-05-06 PROCEDURE — 72197 MRI PELVIS W/O & W/DYE: CPT

## 2018-05-06 PROCEDURE — 99218 HC RM OBSERVATION: CPT

## 2018-05-06 PROCEDURE — A9577 INJ MULTIHANCE: HCPCS | Performed by: FAMILY MEDICINE

## 2018-05-06 PROCEDURE — 74011250637 HC RX REV CODE- 250/637: Performed by: FAMILY MEDICINE

## 2018-05-06 PROCEDURE — 36415 COLL VENOUS BLD VENIPUNCTURE: CPT | Performed by: FAMILY MEDICINE

## 2018-05-06 PROCEDURE — 74011250636 HC RX REV CODE- 250/636: Performed by: FAMILY MEDICINE

## 2018-05-06 PROCEDURE — 65270000029 HC RM PRIVATE

## 2018-05-06 PROCEDURE — 74011250637 HC RX REV CODE- 250/637: Performed by: INTERNAL MEDICINE

## 2018-05-06 PROCEDURE — 85025 COMPLETE CBC W/AUTO DIFF WBC: CPT | Performed by: FAMILY MEDICINE

## 2018-05-06 RX ORDER — MORPHINE SULFATE 4 MG/ML
2 INJECTION, SOLUTION INTRAMUSCULAR; INTRAVENOUS
Status: DISCONTINUED | OUTPATIENT
Start: 2018-05-06 | End: 2018-05-08

## 2018-05-06 RX ORDER — SERTRALINE HYDROCHLORIDE 100 MG/1
100 TABLET, FILM COATED ORAL DAILY
Status: DISCONTINUED | OUTPATIENT
Start: 2018-05-06 | End: 2018-05-09 | Stop reason: HOSPADM

## 2018-05-06 RX ORDER — SODIUM CHLORIDE 0.9 % (FLUSH) 0.9 %
10 SYRINGE (ML) INJECTION
Status: COMPLETED | OUTPATIENT
Start: 2018-05-06 | End: 2018-05-06

## 2018-05-06 RX ADMIN — CIPROFLOXACIN 500 MG: 500 TABLET, FILM COATED ORAL at 09:05

## 2018-05-06 RX ADMIN — Medication 10 ML: at 13:20

## 2018-05-06 RX ADMIN — METRONIDAZOLE 500 MG: 500 TABLET ORAL at 09:05

## 2018-05-06 RX ADMIN — MORPHINE SULFATE 1 MG: 4 INJECTION, SOLUTION INTRAMUSCULAR; INTRAVENOUS at 00:10

## 2018-05-06 RX ADMIN — Medication 10 ML: at 22:02

## 2018-05-06 RX ADMIN — PANTOPRAZOLE SODIUM 40 MG: 40 TABLET, DELAYED RELEASE ORAL at 04:50

## 2018-05-06 RX ADMIN — MORPHINE SULFATE 1 MG: 4 INJECTION, SOLUTION INTRAMUSCULAR; INTRAVENOUS at 13:22

## 2018-05-06 RX ADMIN — MORPHINE SULFATE 1 MG: 4 INJECTION, SOLUTION INTRAMUSCULAR; INTRAVENOUS at 12:55

## 2018-05-06 RX ADMIN — Medication 10 ML: at 19:55

## 2018-05-06 RX ADMIN — METRONIDAZOLE 500 MG: 500 TABLET ORAL at 22:01

## 2018-05-06 RX ADMIN — SERTRALINE HYDROCHLORIDE 100 MG: 100 TABLET ORAL at 09:05

## 2018-05-06 RX ADMIN — HYDROCODONE BITARTRATE AND ACETAMINOPHEN 1 TABLET: 5; 325 TABLET ORAL at 09:07

## 2018-05-06 RX ADMIN — MORPHINE SULFATE 2 MG: 4 INJECTION, SOLUTION INTRAMUSCULAR; INTRAVENOUS at 17:23

## 2018-05-06 RX ADMIN — Medication 10 ML: at 04:52

## 2018-05-06 RX ADMIN — CIPROFLOXACIN 500 MG: 500 TABLET, FILM COATED ORAL at 22:01

## 2018-05-06 RX ADMIN — MORPHINE SULFATE 2 MG: 4 INJECTION, SOLUTION INTRAMUSCULAR; INTRAVENOUS at 22:02

## 2018-05-06 RX ADMIN — HYDROCODONE BITARTRATE AND ACETAMINOPHEN 1 TABLET: 5; 325 TABLET ORAL at 04:48

## 2018-05-06 RX ADMIN — GADOBENATE DIMEGLUMINE 10 ML: 529 INJECTION, SOLUTION INTRAVENOUS at 19:55

## 2018-05-06 RX ADMIN — MORPHINE SULFATE 1 MG: 4 INJECTION, SOLUTION INTRAMUSCULAR; INTRAVENOUS at 06:54

## 2018-05-06 NOTE — PROGRESS NOTES
Pt c/o pain this shift. Medicated per MAR. Hourly rounds performed during this shift; all needs met at this time. Bed in low/locked position and call light, personal items within reach. Will continue to monitor and give bedside shift report to oncoming day shift nurse.

## 2018-05-06 NOTE — PROGRESS NOTES
HISTORY OF PRESENT ILLNESS  Ayanna Mitchell is a 40 y.o. female.  ( x 2, s/p tubal) with admission for abdominal and pelvic pain, presumed endometritis. States her LMP was 2 weeks ago. Abdominal Pain   The history is provided by the patient. This is a chronic problem. The current episode started more than 1 week ago. The problem occurs daily. The problem has been gradually worsening. Associated symptoms include abdominal pain. Pertinent negatives include no chest pain and no shortness of breath. Associated symptoms comments: Menorrhagia with regular cycle, last 5-7 days heavy with clots, sometimes changes pads q 30 minutes. . Nothing aggravates the symptoms. Nothing relieves the symptoms. She has tried acetaminophen and a warm compress (ibuprofen - \"1-2 bottles\") for the symptoms. The treatment provided mild relief. Valentina Jay  has a past medical history of Abnormal Pap smear of cervix; Drug abuse; Gall stones; and Hepatitis C. .    OB History    Para Term  AB Living   2     2   SAB TAB Ectopic Molar Multiple Live Births              # Outcome Date GA Lbr Lamont/2nd Weight Sex Delivery Anes PTL Lv   2             1                      Her surgeries include  has a past surgical history that includes hx dilation and curettage and pr breast surgery procedure unlisted. .    Her current meds are   No current facility-administered medications on file prior to encounter. No current outpatient prescriptions on file prior to encounter. No Known Allergies     Family history is significant for family history is not on file. Sandy Newman  reports that she has been smoking. She has been smoking about 1.00 pack per day. She does not have any smokeless tobacco history on file. She reports that she does not drink alcohol or use illicit drugs. She completed her Systems Review which is documented below.   I tried to relate any problem to gyn systems and if not related she is referred to her PCP. Review of Systems   Constitutional: Negative for chills and fever. Respiratory: Negative. Negative for shortness of breath. Cardiovascular: Negative for chest pain and palpitations. Gastrointestinal: Positive for abdominal pain and constipation. Negative for blood in stool, diarrhea and vomiting. Neurological: Negative. Endo/Heme/Allergies: Does not bruise/bleed easily. Psychiatric/Behavioral: Positive for depression. Blood pressure 142/85, pulse 67, temperature 97.7 °F (36.5 °C), resp. rate 18, height 5' 3\" (1.6 m), weight 170 lb (77.1 kg), SpO2 97 %. Physical Exam   Constitutional: She is oriented to person, place, and time. She appears well-developed and well-nourished. She appears distressed. HENT:   Head: Normocephalic and atraumatic. Pulmonary/Chest: Effort normal. No respiratory distress. Abdominal: Soft. She exhibits no mass. There is tenderness in the suprapubic area. There is no rebound and no guarding. Hernia confirmed negative in the ventral area, confirmed negative in the right inguinal area and confirmed negative in the left inguinal area. nontender in upper quandrants   Genitourinary:   Genitourinary Comments: deferred   Neurological: She is alert and oriented to person, place, and time. Skin: She is not diaphoretic. Psychiatric: Her mood appears anxious. Her speech is rapid and/or pressured. She is agitated. US shows enlarged uterus (~ 450 cc) with large, 7cm intramural mass probable fibroid. NL ES. Ovaries could not be imaged. IMAGES AND REPORT PERSONALLY VIEWED BY ME.    GC/CHlamydia cultures negative. Urine culture negative.     Lab Results   Component Value Date/Time    WBC 8.4 05/06/2018 10:36 AM    HGB 8.7 (L) 05/06/2018 10:36 AM    HCT 27.4 (L) 05/06/2018 10:36 AM    PLATELET 771 (H) 26/96/3243 10:36 AM    MCV 80.6 05/06/2018 10:36 AM      Lab Results   Component Value Date/Time    Sodium 142 05/05/2018 05:49 AM    Potassium 3.8 05/05/2018 05:49 AM    Chloride 109 (H) 05/05/2018 05:49 AM    CO2 28 05/05/2018 05:49 AM    Anion gap 5 (L) 05/05/2018 05:49 AM    Glucose 78 05/05/2018 05:49 AM    BUN 7 05/05/2018 05:49 AM    Creatinine 0.50 (L) 05/05/2018 05:49 AM    GFR est AA >60 05/05/2018 05:49 AM    GFR est non-AA >60 05/05/2018 05:49 AM    Calcium 7.6 (L) 05/05/2018 05:49 AM    Bilirubin, total 0.2 05/04/2018 05:26 AM    AST (SGOT) 14 (L) 05/04/2018 05:26 AM    Alk. phosphatase 60 05/04/2018 05:26 AM    Protein, total 6.1 (L) 05/04/2018 05:26 AM    Albumin 2.4 (L) 05/04/2018 05:26 AM    Globulin 3.7 (H) 05/04/2018 05:26 AM    A-G Ratio 0.6 (L) 05/04/2018 05:26 AM    ALT (SGPT) 21 05/04/2018 05:26 AM      ASSESSMENT and PLAN  Principal Problem:    Possible Endometritis (5/4/2018) vs Fibroid (degenerating?), possible worsening of sx due to high amounts of NSAIDs the past few weeks. Has h/o drug abuse, chronic constipation, Hep C. WBC seems to have improved, but still with pain. Fibroid could be associated but it is unusual to see this degree of pain d/t fibroids. Has MRI scheduled for today, will await results. May need hysterectomy if pain cannot be controlled but clearly needs pap, possible cervical +/- endometrial biopsies d/t h/o abnl pap and no pap in about 10 years or longer. Also is anemic, prob d/t h/o menorrhagia and exacerbated by poor health. Active Problems:    Cholelithiases (5/3/2018)      Leukocytosis (5/3/2018)      Pelvic pain (5/3/2018)      History of hepatitis C (5/3/2018)      UTI (urinary tract infection) (5/4/2018)      Bacterial vaginosis (5/4/2018)      LLQ pain (5/4/2018)      Hematuria (5/4/2018)      Intramural leiomyoma of uterus (5/6/2018)     Time spent face to face with patient reviewing chart and reports and coordinating care with  the above approximately 45 minutes.

## 2018-05-06 NOTE — PROGRESS NOTES
H&P/Consult Note/Progress Note/Office Note:   Brendan Gonzalez  MRN: [id number]  :1980  Age:37 y.o.    HPI: Brendan Gonzalez is a 40 y.o. female who has a PMHx of Hep C and admit drug screen + for [facility]   who presented to ED today with severe lower abdominal/pelvic pain  X 1 week, mostly in the LLQ and suprapubic area. General surgery was asked to evaluate. She had associated N/V. She also reported hematuria. LMP last week. Chronic constipation. WBC 27 initially, now down to 10.7. Hgb 9.6. Pt had US which demonstrated gallstones and distention, CBD 8mm, no cholecystitis. t bili 0.2. LFTs WNL. She endorses a 1mo h/o pelvic pain which has worsened. She has taken a large amount of ibuprofen with no improvement in pain. She has had some vaginal bleeding. She describes her pelvic pain as burning and severe. She has associated N/V. She reports occasional mild RUQ pain with bloating after eating. This is not bothering her right now. She is typically very constipated. + fevers and chills, nights sweats. Denies weight loss. 18 CT abd/pelvis with IV contrast  Hx: Blood in urine. Lower abdominal pain for one week. N/V    Upper images show no infiltrate in either lung base. Spleen is normal. Liver is enlarged, 21 cm in height. No focal liver mass. Large calcified stones are present in the gallbladder. Gallbladder is mildly distended. Pancreas is normal.  Adrenals are normal. Kidneys are normal. No hydronephrosis. The abdominal aorta is normal in size. No adenopathy.     CT Pelvis: There is a large uterine fibroid. No pelvic adenopathy. No free fluid. Bowel loops have a grossly normal appearance in the abdomen and pelvis. The  appendix is normal in size. It measures approximately 4.5 mm in diameter. No infiltration in the periappendiceal fat.       Impression:  1. Cholelithiasis. Mild gallbladder distention.   2. Normal-appearing appendix       5/3/18 Pelvic U/S  Hx: Severe pelvic pain for 2 weeks  Transabdominal and transvaginal images. The uterus measures 13 x 6.8 x 8.5 cm. There is a 7.4 x 6.9 cm fibroid in the body of the uterus. Endometrial stripe is normal, 7 mm. The ovaries were not visualized. No adnexal mass or free fluid. IMPRESSION: Uterine fibroid      5/3/18 RUQ U/S  Liver is enlarged measuring 20.8 cm in height. No liver mass. No intrahepatic bile duct distention. CBD mildly prominent, 8 mm. Gallbladder is distended. No wall thickening. Multiple shadowing stones are present. Pancreas is normal. Right kidney is normal. No hydronephrosis. Abdominal aorta is normal distally, 1.7 cm. IVC patent. IMPRESSION:  1. Cholelithiasis. Mild gallbladder distention.   2. Hepatomegaly    5/3/18 CXR, 2 views  Hx: Leukocytosis  IMPRESSION: No acute abnormality    5/5/18 pelvic pain persists and not well controlled so far; GC/Chlamydia pending; she didn't notice any changes with GI cocktail yesterday   5/6/18 she doesn't feel like she is getting better so far; no new complaints          Past Medical History:   Diagnosis Date    Abnormal Pap smear of cervix     Drug abuse     Imprisoned in [place] d/t cocaine possession (allegedly)    Gall stones     Hepatitis C      Past Surgical History:   Procedure Laterality Date    BREAST SURGERY PROCEDURE UNLISTED      abscess    HX DILATION AND CURETTAGE       Current Facility-Administered Medications   Medication Dose Route Frequency    sertraline (ZOLOFT) tablet 100 mg  100 mg Oral DAILY    morphine injection 2 mg  2 mg IntraVENous Q4H PRN    ciprofloxacin HCl (CIPRO) tablet 500 mg  500 mg Oral Q12H    pantoprazole (PROTONIX) tablet 40 mg  40 mg Oral ACB    metroNIDAZOLE (FLAGYL) tablet 500 mg  500 mg Oral Q12H    sodium chloride (NS) flush 5-10 mL  5-10 mL IntraVENous Q8H    sodium chloride (NS) flush 5-10 mL  5-10 mL IntraVENous PRN    acetaminophen (TYLENOL) tablet 650 mg  650 mg Oral Q4H PRN    HYDROcodone-acetaminophen (NORCO) 5-325 mg per tablet 1 Tab  1 Tab Oral Q4H PRN    ondansetron (ZOFRAN) injection 4 mg  4 mg IntraVENous Q4H PRN    heparin (porcine) injection 5,000 Units  5,000 Units SubCUTAneous Q8H     Review of patient's allergies indicates no known allergies. Social History     Social History    Marital status:      Spouse name: N/A    Number of children: N/A    Years of education: N/A     Social History Main Topics    Smoking status: Current Every Day Smoker     Packs/day: 1.00    Smokeless tobacco: None    Alcohol use No    Drug use: No    Sexual activity: Not Asked     Other Topics Concern    None     Social History Narrative     History   Smoking Status    Current Every Day Smoker    Packs/day: 1.00   Smokeless Tobacco    Not on file     History reviewed. No pertinent family history. ROS: The patient has no difficulty with chest pain or shortness of breath. +fever, chills. Comprehensive review of systems was otherwise unremarkable except as noted above. Physical Exam:   Visit Vitals    /85    Pulse 67    Temp 97.7 °F (36.5 °C)    Resp 18    Ht 5' 3\" (1.6 m)    Wt 170 lb (77.1 kg)    SpO2 97%    BMI 30.11 kg/m2     Constitutional: Alert, oriented, cooperative patient in no acute distress; appears stated age    Eyes:Sclera are clear. EOMs intact  ENMT: no external lesions gross hearing normal; no obvious neck masses, no ear or lip lesions, nares normal  CV: RRR. Normal perfusion  Resp: No JVD. Breathing is  non-labored; no audible wheezing. CTAB  GI: soft and non-distended; no ruq tenderness; very tender lower abd and suprapubic region  Musculoskeletal: unremarkable with normal function. No embolic signs or cyanosis.    Neuro:  Oriented; moves all 4; no focal deficits  Psychiatric: normal affect and mood, no memory impairment    Recent vitals (if inpt):  Patient Vitals for the past 24 hrs:   BP Temp Pulse Resp SpO2   05/06/18 1141 142/85 97.7 °F (36.5 °C) 67 18 97 %   05/06/18 0720 137/77 97.8 °F (36.6 °C) 82 18 95 %   05/06/18 0312 124/80 98 °F (36.7 °C) 80 16 97 %   05/05/18 2307 123/74 97.4 °F (36.3 °C) 85 18 98 %   05/05/18 1853 126/81 98.3 °F (36.8 °C) 81 16 97 %   05/05/18 1454 110/60 - 64 16 98 %       Labs:  Recent Labs      05/06/18   1036  05/05/18   0549  05/04/18   0526   WBC  8.4  6.6  10.7   HGB  8.7*  7.8*  7.9*   PLT  495*  433  392   NA   --   142  142   K   --   3.8  3.5   CL   --   109*  109*   CO2   --   28  27   BUN   --   7  8   CREA   --   0.50*  0.48*   GLU   --   78  105*   TBILI   --    --   0.2   SGOT   --    --   14*   ALT   --    --   21   AP   --    --   60       Lab Results   Component Value Date/Time    WBC 8.4 05/06/2018 10:36 AM    HGB 8.7 (L) 05/06/2018 10:36 AM    PLATELET 603 (H) 10/70/0581 10:36 AM    Sodium 142 05/05/2018 05:49 AM    Potassium 3.8 05/05/2018 05:49 AM    Chloride 109 (H) 05/05/2018 05:49 AM    CO2 28 05/05/2018 05:49 AM    BUN 7 05/05/2018 05:49 AM    Creatinine 0.50 (L) 05/05/2018 05:49 AM    Glucose 78 05/05/2018 05:49 AM    Bilirubin, total 0.2 05/04/2018 05:26 AM    AST (SGOT) 14 (L) 05/04/2018 05:26 AM    ALT (SGPT) 21 05/04/2018 05:26 AM    Alk. phosphatase 60 05/04/2018 05:26 AM    Lipase 73 05/03/2018 06:52 AM       I reviewed recent labs and recent radiologic studies. CT Results (most recent):    Results from Hospital Encounter encounter on 05/03/18   CT ABD PELV W CONT   Narrative CT Abdomen and Pelvis:  5/3/2018      Clinical Information: Blood in urine. Lower abdominal pain for one week. Nausea  and vomiting    Comparison CT: None    Standard 5mm axial images of the abdomen and pelvis were obtained. 100 cc Optiray 350 intravenous contrast was used for better evaluation of solid  organs and vascular structures. This study specifically requested, and performed no oral contrast. Please note  that this limits evaluation of bowel. Radiation dose reduction techniques were used for this study.   Our scanners use  one or all of the following:  Automated exposure control, adjustment of the mA  and/or kV according to patient size, iterative reconstruction. CT Abdomen:    Upper images show no infiltrate in either lung base. Spleen is normal. Liver is  enlarged, 21 cm in height. No focal liver mass. Large calcified stones are  present in the gallbladder. Gallbladder is mildly distended. Pancreas is normal.  Adrenals are normal. Kidneys are normal. No hydronephrosis. The abdominal aorta  is normal in size. No adenopathy. CT Pelvis: There is a large uterine fibroid. No pelvic adenopathy. No free fluid. Bowel loops have a grossly normal appearance in the abdomen and pelvis. The  appendix is normal in size. It measures approximately 4.5 mm in diameter. No  infiltration in the periappendiceal fat. Impression Impression:      1. Cholelithiasis. Mild gallbladder distention. 2. Normal-appearing appendix          XR Results (most recent):    Results from Hospital Encounter encounter on 05/03/18   XR CHEST PA LAT   Narrative Chest 2 view dated 5/3/2018    CLINICAL INFORMATION: Leukocytosis    Heart is normal size and mediastinum unremarkable. Lungs clear and pulmonary  vascularity normal. No pleural effusion. Impression IMPRESSION: No acute abnormality            I independently reviewed radiology images for studies I described above or studies I have ordered.    Admission date (for inpatients): 5/3/2018   * No surgery found *  * No surgery found *    ASSESSMENT/PLAN:  Problem List  Date Reviewed: 5/6/2018          Codes Class Noted    Intramural leiomyoma of uterus ICD-10-CM: D25.1  ICD-9-CM: 218.1  5/6/2018        Abdominal pain ICD-10-CM: R10.9  ICD-9-CM: 789.00  5/6/2018        UTI (urinary tract infection) ICD-10-CM: N39.0  ICD-9-CM: 599.0  5/4/2018        Bacterial vaginosis ICD-10-CM: N76.0, B96.89  ICD-9-CM: 616.10, 041.9  5/4/2018        * (Principal)Possible Endometritis ICD-10-CM: N71.9  ICD-9-CM: 615.9 5/4/2018        LLQ pain ICD-10-CM: R10.32  ICD-9-CM: 789.04  5/4/2018        Hematuria ICD-10-CM: R31.9  ICD-9-CM: 599.70  5/4/2018        Cholelithiases ICD-10-CM: K80.20  ICD-9-CM: 574.20  5/3/2018        Leukocytosis ICD-10-CM: T76.271  ICD-9-CM: 288.60  5/3/2018        Pelvic pain ICD-10-CM: R10.2  ICD-9-CM: NAM5654  5/3/2018        History of hepatitis C ICD-10-CM: Z86.19  ICD-9-CM: V12.09  5/3/2018            Principal Problem:    Possible Endometritis (5/4/2018)    Active Problems:    Cholelithiases (5/3/2018)      Leukocytosis (5/3/2018)      Pelvic pain (5/3/2018)      History of hepatitis C (5/3/2018)      UTI (urinary tract infection) (5/4/2018)      Bacterial vaginosis (5/4/2018)      LLQ pain (5/4/2018)      Hematuria (5/4/2018)      Intramural leiomyoma of uterus (5/6/2018)      Abdominal pain (5/6/2018)       Plan:    Asymptomatic gallstones  Cholecystectomy not indicated at this time. No upper abd pain at present    She likely has some component of gastritis from heavy NSAID use--->IV Pepcid q12hrs   Toradol is not a good option for her at this time (discontinue)    When I review her CT her uterus markedly enhances, suggestive of possible endometritis; Defer to GYN but looks like endometritis should be high on differential.   GC/Chlamydia cultures pending  IV Zosyn    Do not see any general surgery issues that are causing her pelvic pain at this time.         Signed:  Stephen Davis MD

## 2018-05-06 NOTE — PROGRESS NOTES
Hospitalist Progress Note     Admit Date:  5/3/2018  6:55 AM   Name:  Olivia Stevens   Age:  40 y.o.  :  1980   MRN:  [id number]   PCP:  None  Treatment Team: Attending Provider: Tricia Don MD; Utilization Review: Samanta Wright RN; Consulting Provider: Darren Westbrook MD; Consulting Provider: Jordy Frye MD; Utilization Review: Nataly Mars RN    Subjective:   46yo F with PMH of Hepatitis C presents with moderate to severe abdominal pain x1 week. She reports pain is mostly in lower abdomen and right quadrants and is associated with intermittent nausea and emesis. She denies diarrhea but reports chronic constipation. She has been using large quantities of Ibuprofen for pain control since last week. LMP was last week and she reports menses typically heavy with clots. She states this pain is different from menstrual cramps. In the ER, she is afebrile and normotensive. Her labs are pertinent for :  WBC 27K, HGB 9.6 and are otherwise insignificant. 18  Family at bedside  C/o lower abd pain  Wants to eat    18  C/o lower abd pain  No iv access  Gyn and surgery - no surgical indication    18  Pt still c/o pain  OBGYN saw this am -?  Hysterectomy if pain doesn't improve  MRI pelvis pending  Chlamydia/gonorrhea- negative    Objective:     Patient Vitals for the past 24 hrs:   Temp Pulse Resp BP SpO2   18 1441 97.8 °F (36.6 °C) 70 18 138/80 98 %   18 1141 97.7 °F (36.5 °C) 67 18 142/85 97 %   18 0720 97.8 °F (36.6 °C) 82 18 137/77 95 %   18 0312 98 °F (36.7 °C) 80 16 124/80 97 %   18 2307 97.4 °F (36.3 °C) 85 18 123/74 98 %   18 1853 98.3 °F (36.8 °C) 81 16 126/81 97 %     Oxygen Therapy  O2 Sat (%): 98 % (18 1441)  Pulse via Oximetry: 86 beats per minute (18 2206)  O2 Device: Room air (18 2212)    Intake/Output Summary (Last 24 hours) at 18 1545  Last data filed at 18 1441   Gross per 24 hour   Intake 1020 ml   Output                0 ml   Net             1020 ml         General:    Well nourished. Alert.    heent- normal  CV:   RRR. No murmur, rub, or gallop. Lungs:   Clear to auscultation bilaterally. No wheezing, rhonchi, or rales. Abdomen:   Suprapubic and rt lower quadrant tender,mild guarding, no rebound. Cns- normal  Extremities: Warm and dry. No cyanosis or edema. Skin:     No rashes or jaundice. Data Review:  I have reviewed all labs, meds, telemetry events, and studies from the last 24 hours. Recent Results (from the past 24 hour(s))   CBC WITH AUTOMATED DIFF    Collection Time: 05/06/18 10:36 AM   Result Value Ref Range    WBC 8.4 4.3 - 11.1 K/uL    RBC 3.40 (L) 4.05 - 5.25 M/uL    HGB 8.7 (L) 11.7 - 15.4 g/dL    HCT 27.4 (L) 35.8 - 46.3 %    MCV 80.6 79.6 - 97.8 FL    MCH 25.6 (L) 26.1 - 32.9 PG    MCHC 31.8 31.4 - 35.0 g/dL    RDW 14.7 (H) 11.9 - 14.6 %    PLATELET 328 (H) 227 - 450 K/uL    MPV 9.1 (L) 10.8 - 14.1 FL    DF AUTOMATED      NEUTROPHILS 74 43 - 78 %    LYMPHOCYTES 18 13 - 44 %    MONOCYTES 5 4.0 - 12.0 %    EOSINOPHILS 2 0.5 - 7.8 %    BASOPHILS 1 0.0 - 2.0 %    IMMATURE GRANULOCYTES 0 0.0 - 5.0 %    ABS. NEUTROPHILS 6.2 1.7 - 8.2 K/UL    ABS. LYMPHOCYTES 1.5 0.5 - 4.6 K/UL    ABS. MONOCYTES 0.4 0.1 - 1.3 K/UL    ABS. EOSINOPHILS 0.2 0.0 - 0.8 K/UL    ABS. BASOPHILS 0.0 0.0 - 0.2 K/UL    ABS. IMM.  GRANS. 0.0 0.0 - 0.5 K/UL        All Micro Results     Procedure Component Value Units Date/Time    CULTURE, URINE [[id number]] Collected:  05/03/18 2022    Order Status:  Completed Specimen:  Urine from Clean catch Updated:  05/06/18 0802     Special Requests: NO SPECIAL REQUESTS        Culture result:       <10,000  COLONIES/mL  NORMAL SKIN FAHAD ISOLATED      INFLUENZA A & B AG (RAPID TEST) [[id number]] Collected:  05/03/18 1553    Order Status:  Completed Specimen:  Nasopharyngeal from Nasal washing Updated:  05/03/18 1619     Influenza A Ag NEGATIVE          NEGATIVE FOR THE PRESENCE OF INFLUENZA A ANTIGEN  INFECTION DUE TO INFLUENZA A CANNOT BE RULED OUT. BECAUSE THE ANTIGEN PRESENT IN THE SAMPLE MAY BE BELOW  THE DETECTION LIMIT OF THE TEST. A NEGATIVE TEST IS PRESUMPTIVE AND IT IS RECOMMENDED THAT THESE RESULTS BE CONFIRMED BY VIRAL CULTURE OR AN FDA-CLEARED INFLUENZA A AND B MOLECULAR ASSAY. Influenza B Ag NEGATIVE          NEGATIVE FOR THE PRESENCE OF INFLUENZA B ANTIGEN  INFECTION DUE TO INFLUENZA B CANNOT BE RULED OUT. BECAUSE THE ANTIGEN PRESENT IN THE SAMPLE MAY BE BELOW  THE DETECTION LIMIT OF THE TEST. A NEGATIVE TEST IS PRESUMPTIVE AND IT IS RECOMMENDED THAT THESE RESULTS BE CONFIRMED BY VIRAL CULTURE OR AN FDA-CLEARED INFLUENZA A AND B MOLECULAR ASSAY. WET PREP [[id number]] Collected:  05/03/18 0934    Order Status:  Completed Specimen:  Vagina Updated:  05/03/18 0952     Special Requests: NO SPECIAL REQUESTS        Wet prep NO WBCS SEEN        Wet prep NO YEAST SEEN        Wet prep NO TRICHOMONAS SEEN        Wet prep --        FEW  CLUE CELLS PRESENT            Current Meds:  Current Facility-Administered Medications   Medication Dose Route Frequency    sertraline (ZOLOFT) tablet 100 mg  100 mg Oral DAILY    morphine injection 2 mg  2 mg IntraVENous Q4H PRN    ciprofloxacin HCl (CIPRO) tablet 500 mg  500 mg Oral Q12H    pantoprazole (PROTONIX) tablet 40 mg  40 mg Oral ACB    metroNIDAZOLE (FLAGYL) tablet 500 mg  500 mg Oral Q12H    sodium chloride (NS) flush 5-10 mL  5-10 mL IntraVENous Q8H    sodium chloride (NS) flush 5-10 mL  5-10 mL IntraVENous PRN    acetaminophen (TYLENOL) tablet 650 mg  650 mg Oral Q4H PRN    HYDROcodone-acetaminophen (NORCO) 5-325 mg per tablet 1 Tab  1 Tab Oral Q4H PRN    ondansetron (ZOFRAN) injection 4 mg  4 mg IntraVENous Q4H PRN    heparin (porcine) injection 5,000 Units  5,000 Units SubCUTAneous Q8H       Other Studies (last 24 hours):  No results found.     Assessment and Plan:     Hospital Problems as of 5/4/2018  Never Reviewed          Codes Class Noted - Resolved POA    Cholelithiases ICD-10-CM: K80.20  ICD-9-CM: 574.20  5/3/2018 - Present Unknown        Leukocytosis ICD-10-CM: D72.829  ICD-9-CM: 288.60  5/3/2018 - Present Unknown        * (Principal)Abdominal pain ICD-10-CM: R10.9  ICD-9-CM: 789.00  5/3/2018 - Present Unknown        History of hepatitis C ICD-10-CM: Z86.19  ICD-9-CM: V12.09  5/3/2018 - Present Unknown    Uti,bacterai vaginosis          PLAN:    abd pain- suprapubic and rt lower-?etiology- ct and pelvic ultrasound beningn findings-surgery- no surgical indication. gynecologist felt the pain from fibroids. May need hysterectomy if no pain relief. MRI pelvis with contrast pending. Increased pain medication to morphine 2 mg iv a 4hr/prn.  uti- ciprofloxacin- urine culture <10,000 mixed yuni  Bacterial vaginosis- flagyl---- chlamydia and gonorrhea- negative  Hept c  cholelithiasis    DC planning/Dispo:    DVT ppx:  heparin    Signed:  Chemo Valdez MD

## 2018-05-07 ENCOUNTER — ANESTHESIA EVENT (OUTPATIENT)
Dept: SURGERY | Age: 38
DRG: 742 | End: 2018-05-07
Payer: SUBSIDIZED

## 2018-05-07 LAB
ABO + RH BLD: NORMAL
AMPHET UR QL SCN: NEGATIVE
BARBITURATES UR QL SCN: NEGATIVE
BENZODIAZ UR QL: NEGATIVE
BLOOD GROUP ANTIBODIES SERPL: NORMAL
CANNABINOIDS UR QL SCN: NEGATIVE
COCAINE UR QL SCN: NEGATIVE
HCG UR QL: NEGATIVE
HIV1 P24 AG SERPL QL IA: NONREACTIVE
HIV1+2 AB SERPL QL IA: NONREACTIVE
METHADONE UR QL: NEGATIVE
OPIATES UR QL: POSITIVE
PCP UR QL: NEGATIVE
SPECIMEN EXP DATE BLD: NORMAL

## 2018-05-07 PROCEDURE — 81025 URINE PREGNANCY TEST: CPT | Performed by: NURSE PRACTITIONER

## 2018-05-07 PROCEDURE — 80307 DRUG TEST PRSMV CHEM ANLYZR: CPT | Performed by: NURSE PRACTITIONER

## 2018-05-07 PROCEDURE — 65270000029 HC RM PRIVATE

## 2018-05-07 PROCEDURE — 87389 HIV-1 AG W/HIV-1&-2 AB AG IA: CPT | Performed by: NURSE PRACTITIONER

## 2018-05-07 PROCEDURE — 74011250637 HC RX REV CODE- 250/637: Performed by: FAMILY MEDICINE

## 2018-05-07 PROCEDURE — 74011250636 HC RX REV CODE- 250/636: Performed by: FAMILY MEDICINE

## 2018-05-07 PROCEDURE — 86900 BLOOD TYPING SEROLOGIC ABO: CPT | Performed by: NURSE PRACTITIONER

## 2018-05-07 PROCEDURE — 36415 COLL VENOUS BLD VENIPUNCTURE: CPT | Performed by: NURSE PRACTITIONER

## 2018-05-07 PROCEDURE — 74011250637 HC RX REV CODE- 250/637: Performed by: INTERNAL MEDICINE

## 2018-05-07 RX ORDER — KETOROLAC TROMETHAMINE 15 MG/ML
15 INJECTION, SOLUTION INTRAMUSCULAR; INTRAVENOUS ONCE
Status: COMPLETED | OUTPATIENT
Start: 2018-05-07 | End: 2018-05-07

## 2018-05-07 RX ORDER — MORPHINE SULFATE 4 MG/ML
2 INJECTION, SOLUTION INTRAMUSCULAR; INTRAVENOUS ONCE
Status: ACTIVE | OUTPATIENT
Start: 2018-05-07 | End: 2018-05-08

## 2018-05-07 RX ADMIN — Medication 10 ML: at 22:00

## 2018-05-07 RX ADMIN — HYDROCODONE BITARTRATE AND ACETAMINOPHEN 1 TABLET: 5; 325 TABLET ORAL at 22:02

## 2018-05-07 RX ADMIN — HYDROCODONE BITARTRATE AND ACETAMINOPHEN 1 TABLET: 5; 325 TABLET ORAL at 09:11

## 2018-05-07 RX ADMIN — MORPHINE SULFATE 2 MG: 4 INJECTION, SOLUTION INTRAMUSCULAR; INTRAVENOUS at 10:21

## 2018-05-07 RX ADMIN — CIPROFLOXACIN 500 MG: 500 TABLET, FILM COATED ORAL at 20:15

## 2018-05-07 RX ADMIN — MORPHINE SULFATE 2 MG: 4 INJECTION, SOLUTION INTRAMUSCULAR; INTRAVENOUS at 05:29

## 2018-05-07 RX ADMIN — Medication 10 ML: at 14:34

## 2018-05-07 RX ADMIN — KETOROLAC TROMETHAMINE 15 MG: 15 INJECTION, SOLUTION INTRAMUSCULAR; INTRAVENOUS at 18:39

## 2018-05-07 RX ADMIN — PANTOPRAZOLE SODIUM 40 MG: 40 TABLET, DELAYED RELEASE ORAL at 05:29

## 2018-05-07 RX ADMIN — CIPROFLOXACIN 500 MG: 500 TABLET, FILM COATED ORAL at 09:11

## 2018-05-07 RX ADMIN — METRONIDAZOLE 500 MG: 500 TABLET ORAL at 09:11

## 2018-05-07 RX ADMIN — METRONIDAZOLE 500 MG: 500 TABLET ORAL at 20:15

## 2018-05-07 RX ADMIN — HYDROCODONE BITARTRATE AND ACETAMINOPHEN 1 TABLET: 5; 325 TABLET ORAL at 17:07

## 2018-05-07 RX ADMIN — ONDANSETRON 4 MG: 2 INJECTION INTRAMUSCULAR; INTRAVENOUS at 18:41

## 2018-05-07 RX ADMIN — Medication 10 ML: at 05:47

## 2018-05-07 RX ADMIN — SERTRALINE HYDROCHLORIDE 100 MG: 100 TABLET ORAL at 09:12

## 2018-05-07 RX ADMIN — MORPHINE SULFATE 2 MG: 4 INJECTION, SOLUTION INTRAMUSCULAR; INTRAVENOUS at 14:32

## 2018-05-07 NOTE — PROGRESS NOTES
H&P/Consult Note/Progress Note/Office Note:   Kamar Hernandez  MRN: [id number]  :1980  Age:37 y.o.    HPI: Kamar Hernandez is a 40 y.o. female who has a PMHx of Hep C and admit drug screen + for THC/mairjuana   who presented to ED today with severe lower abdominal/pelvic pain  X 1 week, mostly in the LLQ and suprapubic area. General surgery was asked to evaluate. She had associated N/V. She also reported hematuria. LMP last week. Chronic constipation. WBC 27 initially, but quickly normalized. Pt had US which demonstrated gallstones and distention, CBD 8mm, no cholecystitis. T bili 0.2, LFTs WNL. She reported a 1mo h/o pelvic pain which has worsened. She has taken a large amount of ibuprofen with no improvement in pain. She also had some vaginal bleeding. She describes her pelvic pain as burning and severe. She has associated N/V. She reported occasional mild RUQ pain with bloating after eating. +fevers/chills/nights sweats. Denies weight loss. 18 CT abd/pelvis with IV contrast  Hx: Blood in urine. Lower abdominal pain for one week. N/V    Upper images show no infiltrate in either lung base. Spleen is normal. Liver is enlarged, 21 cm in height. No focal liver mass. Large calcified stones are present in the gallbladder. Gallbladder is mildly distended. Pancreas is normal.  Adrenals are normal. Kidneys are normal. No hydronephrosis. The abdominal aorta is normal in size. No adenopathy.     CT Pelvis: There is a large uterine fibroid. No pelvic adenopathy. No free fluid. Bowel loops have a grossly normal appearance in the abdomen and pelvis. The  appendix is normal in size. It measures approximately 4.5 mm in diameter. No infiltration in the periappendiceal fat.       Impression:  1. Cholelithiasis. Mild gallbladder distention. 2. Normal-appearing appendix       5/3/18 Pelvic U/S  Hx: Severe pelvic pain for 2 weeks  Transabdominal and transvaginal images.   The uterus measures 13 x 6.8 x 8.5 cm. There is a 7.4 x 6.9 cm fibroid in the body of the uterus. Endometrial stripe is normal, 7 mm. The ovaries were not visualized. No adnexal mass or free fluid. IMPRESSION: Uterine fibroid      5/3/18 RUQ U/S  Liver is enlarged measuring 20.8 cm in height. No liver mass. No intrahepatic bile duct distention. CBD mildly prominent, 8 mm. Gallbladder is distended. No wall thickening. Multiple shadowing stones are present. Pancreas is normal. Right kidney is normal. No hydronephrosis. Abdominal aorta is normal distally, 1.7 cm. IVC patent. IMPRESSION:  1. Cholelithiasis. Mild gallbladder distention.   2. Hepatomegaly    5/3/18 CXR, 2 views  Hx: Leukocytosis  IMPRESSION: No acute abnormality    5/5/18 pelvic pain persists and not well controlled so far; GC/Chlamydia pending; she didn't notice any changes with GI cocktail yesterday   5/6/18 she doesn't feel like she is getting better so far; no new complaints  5/7/18 Overall feels her lower pelvic pain is only 20% improved since admission        Past Medical History:   Diagnosis Date    Abnormal Pap smear of cervix     Drug abuse     Imprisoned in [place] d/t cocaine possession (allegedly)    Gall stones     Hepatitis C      Past Surgical History:   Procedure Laterality Date    BREAST SURGERY PROCEDURE UNLISTED      abscess    HX DILATION AND CURETTAGE       Current Facility-Administered Medications   Medication Dose Route Frequency    sertraline (ZOLOFT) tablet 100 mg  100 mg Oral DAILY    morphine injection 2 mg  2 mg IntraVENous Q4H PRN    ciprofloxacin HCl (CIPRO) tablet 500 mg  500 mg Oral Q12H    pantoprazole (PROTONIX) tablet 40 mg  40 mg Oral ACB    metroNIDAZOLE (FLAGYL) tablet 500 mg  500 mg Oral Q12H    sodium chloride (NS) flush 5-10 mL  5-10 mL IntraVENous Q8H    sodium chloride (NS) flush 5-10 mL  5-10 mL IntraVENous PRN    acetaminophen (TYLENOL) tablet 650 mg  650 mg Oral Q4H PRN    HYDROcodone-acetaminophen (NORCO) 5-325 mg per tablet 1 Tab  1 Tab Oral Q4H PRN    ondansetron (ZOFRAN) injection 4 mg  4 mg IntraVENous Q4H PRN    heparin (porcine) injection 5,000 Units  5,000 Units SubCUTAneous Q8H     Review of patient's allergies indicates no known allergies. Social History     Social History    Marital status:      Spouse name: N/A    Number of children: N/A    Years of education: N/A     Social History Main Topics    Smoking status: Current Every Day Smoker     Packs/day: 1.00    Smokeless tobacco: None    Alcohol use No    Drug use: No    Sexual activity: Not Asked     Other Topics Concern    None     Social History Narrative     History   Smoking Status    Current Every Day Smoker    Packs/day: 1.00   Smokeless Tobacco    Not on file     History reviewed. No pertinent family history. ROS: The patient has no difficulty with chest pain or shortness of breath. +fever, chills. Comprehensive review of systems was otherwise unremarkable except as noted above. Physical Exam:   Visit Vitals    /81    Pulse 81    Temp 98 °F (36.7 °C)    Resp 18    Ht 5' 3\" (1.6 m)    Wt 170 lb (77.1 kg)    SpO2 96%    BMI 30.11 kg/m2     Constitutional: Alert, oriented, cooperative patient in no acute distress; appears stated age    Eyes:Sclera are clear. EOMs intact  ENMT: no external lesions gross hearing normal; no obvious neck masses, no ear or lip lesions, nares normal  CV: RRR. Normal perfusion  Resp: No JVD. Breathing is  non-labored; no audible wheezing. CTAB  GI: soft and non-distended; no ruq tenderness; very tender lower abd and suprapubic region  Musculoskeletal: unremarkable with normal function. No embolic signs or cyanosis.    Neuro:  Oriented; moves all 4; no focal deficits  Psychiatric: normal affect and mood, no memory impairment    Recent vitals (if inpt):  Patient Vitals for the past 24 hrs:   BP Temp Pulse Resp SpO2   05/07/18 0719 121/81 98 °F (36.7 °C) 81 18 96 %   05/07/18 0527 134/76 98 °F (36.7 °C) 79 18 97 %   05/07/18 0000 132/84 98.2 °F (36.8 °C) 81 18 97 %   05/06/18 2014 139/76 98.5 °F (36.9 °C) 77 18 97 %   05/06/18 1441 138/80 97.8 °F (36.6 °C) 70 18 98 %   05/06/18 1141 142/85 97.7 °F (36.5 °C) 67 18 97 %       Labs:  Recent Labs      05/06/18   1036  05/05/18   0549   WBC  8.4  6.6   HGB  8.7*  7.8*   PLT  495*  433   NA   --   142   K   --   3.8   CL   --   109*   CO2   --   28   BUN   --   7   CREA   --   0.50*   GLU   --   78       Lab Results   Component Value Date/Time    WBC 8.4 05/06/2018 10:36 AM    HGB 8.7 (L) 05/06/2018 10:36 AM    PLATELET 494 (H) 44/27/9497 10:36 AM    Sodium 142 05/05/2018 05:49 AM    Potassium 3.8 05/05/2018 05:49 AM    Chloride 109 (H) 05/05/2018 05:49 AM    CO2 28 05/05/2018 05:49 AM    BUN 7 05/05/2018 05:49 AM    Creatinine 0.50 (L) 05/05/2018 05:49 AM    Glucose 78 05/05/2018 05:49 AM    Bilirubin, total 0.2 05/04/2018 05:26 AM    AST (SGOT) 14 (L) 05/04/2018 05:26 AM    ALT (SGPT) 21 05/04/2018 05:26 AM    Alk. phosphatase 60 05/04/2018 05:26 AM    Lipase 73 05/03/2018 06:52 AM       I reviewed recent labs and recent radiologic studies. CT Results (most recent):    Results from Hospital Encounter encounter on 05/03/18   CT ABD PELV W CONT   Narrative CT Abdomen and Pelvis:  5/3/2018      Clinical Information: Blood in urine. Lower abdominal pain for one week. Nausea  and vomiting    Comparison CT: None    Standard 5mm axial images of the abdomen and pelvis were obtained. 100 cc Optiray 350 intravenous contrast was used for better evaluation of solid  organs and vascular structures. This study specifically requested, and performed no oral contrast. Please note  that this limits evaluation of bowel. Radiation dose reduction techniques were used for this study. Our scanners use  one or all of the following:  Automated exposure control, adjustment of the mA  and/or kV according to patient size, iterative reconstruction.       CT Abdomen:    Upper images show no infiltrate in either lung base. Spleen is normal. Liver is  enlarged, 21 cm in height. No focal liver mass. Large calcified stones are  present in the gallbladder. Gallbladder is mildly distended. Pancreas is normal.  Adrenals are normal. Kidneys are normal. No hydronephrosis. The abdominal aorta  is normal in size. No adenopathy. CT Pelvis: There is a large uterine fibroid. No pelvic adenopathy. No free fluid. Bowel loops have a grossly normal appearance in the abdomen and pelvis. The  appendix is normal in size. It measures approximately 4.5 mm in diameter. No  infiltration in the periappendiceal fat. Impression Impression:      1. Cholelithiasis. Mild gallbladder distention. 2. Normal-appearing appendix          XR Results (most recent):    Results from Hospital Encounter encounter on 05/03/18   XR CHEST PA LAT   Narrative Chest 2 view dated 5/3/2018    CLINICAL INFORMATION: Leukocytosis    Heart is normal size and mediastinum unremarkable. Lungs clear and pulmonary  vascularity normal. No pleural effusion. Impression IMPRESSION: No acute abnormality            I independently reviewed radiology images for studies I described above or studies I have ordered.    Admission date (for inpatients): 5/3/2018   * No surgery found *  * No surgery found *    ASSESSMENT/PLAN:  Problem List  Date Reviewed: 5/6/2018          Codes Class Noted    Intramural leiomyoma of uterus ICD-10-CM: D25.1  ICD-9-CM: 218.1  5/6/2018        Abdominal pain ICD-10-CM: R10.9  ICD-9-CM: 789.00  5/6/2018        UTI (urinary tract infection) ICD-10-CM: N39.0  ICD-9-CM: 599.0  5/4/2018        Bacterial vaginosis ICD-10-CM: N76.0, B96.89  ICD-9-CM: 616.10, 041.9  5/4/2018        * (Principal)Possible Endometritis ICD-10-CM: N71.9  ICD-9-CM: 615.9  5/4/2018        LLQ pain ICD-10-CM: R10.32  ICD-9-CM: 789.04  5/4/2018        Hematuria ICD-10-CM: R31.9  ICD-9-CM: 599.70  5/4/2018 Cholelithiases ICD-10-CM: K80.20  ICD-9-CM: 574.20  5/3/2018        Leukocytosis ICD-10-CM: V69.758  ICD-9-CM: 288.60  5/3/2018        Pelvic pain ICD-10-CM: R10.2  ICD-9-CM: JOX7114  5/3/2018        History of hepatitis C ICD-10-CM: Z86.19  ICD-9-CM: V12.09  5/3/2018            Principal Problem:    Possible Endometritis (5/4/2018)    Active Problems:    Cholelithiases (5/3/2018)      Leukocytosis (5/3/2018)      Pelvic pain (5/3/2018)      History of hepatitis C (5/3/2018)      UTI (urinary tract infection) (5/4/2018)      Bacterial vaginosis (5/4/2018)      LLQ pain (5/4/2018)      Hematuria (5/4/2018)      Intramural leiomyoma of uterus (5/6/2018)      Abdominal pain (5/6/2018)       Plan:    No upper abd pain at present  Asymptomatic gallstones at present. Cholecystectomy not indicated at this time. Recent heavy NSAID use--->IV Pepcid q12hrs   Toradol is not a good option for her at this time (discontinue)    When I review her CT her uterus markedly enhances, suggestive of possible endometritis  Defer to GYN but looks like endometritis should be high on differential.   GC/Chlamydia cultures pending  IV Zosyn    Do not see any general surgery issues that are causing her pelvic pain at this time.   Will sign off for now and see her in the office approx 3 weeks after discharge to discuss elective cholecystectomy        Signed:  Juan Vargas MD

## 2018-05-07 NOTE — PROGRESS NOTES
Hourly rounds completed this shift. Patient got one time dose of Pain medication per [facility], per MD orders. Passed on to night shift RN. No needs at this time. Report given to nightshift RN.

## 2018-05-07 NOTE — PROGRESS NOTES
Hospitalist Progress Note     Admit Date:  5/3/2018  6:55 AM   Name:  Janina Wilburn   Age:  40 y.o.  :  1980   MRN:  [id number]   PCP:  None  Treatment Team: Attending Provider: Lisa Cotton MD; Utilization Review: Ron Chaves, RN; Consulting Provider: Ursula Loo MD; Utilization Review: Yariel Sweeney RN; Consulting Provider: Gris Richter MD    Subjective:   44yo F with PMH of Hepatitis C presents with moderate to severe abdominal pain x1 week. She reports pain is mostly in lower abdomen and right quadrants and is associated with intermittent nausea and emesis. She denies diarrhea but reports chronic constipation. She has been using large quantities of Ibuprofen for pain control since last week. LMP was last week and she reports menses typically heavy with clots. She states this pain is different from menstrual cramps. In the ER, she is afebrile and normotensive. Her labs are pertinent for :  WBC 27K, HGB 9.6 and are otherwise insignificant. 18  Family at bedside  C/o lower abd pain  Wants to eat    18  C/o lower abd pain  No iv access  Gyn and surgery - no surgical indication    18  Pt still c/o pain  OBGYN saw this am -?  Hysterectomy if pain doesn't improve  MRI pelvis pending  Chlamydia/gonorrhea- negative    18  abd pain better on pain medications    Objective:     Patient Vitals for the past 24 hrs:   Temp Pulse Resp BP SpO2   18 1431 97.9 °F (36.6 °C) 74 16 135/87 97 %   18 1258 97.8 °F (36.6 °C) 73 18 131/85 97 %   18 0719 98 °F (36.7 °C) 81 18 121/81 96 %   18 0527 98 °F (36.7 °C) 79 18 134/76 97 %   18 0000 98.2 °F (36.8 °C) 81 18 132/84 97 %   18 2014 98.5 °F (36.9 °C) 77 18 139/76 97 %     Oxygen Therapy  O2 Sat (%): 97 % (18 1431)  Pulse via Oximetry: 86 beats per minute (186)  O2 Device: Room air (18 2212)    Intake/Output Summary (Last 24 hours) at 18 1701  Last data filed at 05/07/18 1258   Gross per 24 hour   Intake              480 ml   Output                0 ml   Net              480 ml         General:    Well nourished. Alert.    heent- normal  CV:   RRR. No murmur, rub, or gallop. Lungs:   Clear to auscultation bilaterally. No wheezing, rhonchi, or rales. Abdomen:   Suprapubic and rt lower quadrant tender,mild guarding, no rebound. Cns- normal  Extremities: Warm and dry. No cyanosis or edema. Skin:     No rashes or jaundice. Data Review:  I have reviewed all labs, meds, telemetry events, and studies from the last 24 hours. No results found for this or any previous visit (from the past 24 hour(s)). All Micro Results     Procedure Component Value Units Date/Time    CULTURE, URINE [[id number]] Collected:  05/03/18 2022    Order Status:  Completed Specimen:  Urine from Clean catch Updated:  05/06/18 0802     Special Requests: NO SPECIAL REQUESTS        Culture result:       <10,000  COLONIES/mL  NORMAL SKIN FAHAD ISOLATED      INFLUENZA A & B AG (RAPID TEST) [[id number]] Collected:  05/03/18 1553    Order Status:  Completed Specimen:  Nasopharyngeal from Nasal washing Updated:  05/03/18 1619     Influenza A Ag NEGATIVE          NEGATIVE FOR THE PRESENCE OF INFLUENZA A ANTIGEN  INFECTION DUE TO INFLUENZA A CANNOT BE RULED OUT. BECAUSE THE ANTIGEN PRESENT IN THE SAMPLE MAY BE BELOW  THE DETECTION LIMIT OF THE TEST. A NEGATIVE TEST IS PRESUMPTIVE AND IT IS RECOMMENDED THAT THESE RESULTS BE CONFIRMED BY VIRAL CULTURE OR AN FDA-CLEARED INFLUENZA A AND B MOLECULAR ASSAY. Influenza B Ag NEGATIVE          NEGATIVE FOR THE PRESENCE OF INFLUENZA B ANTIGEN  INFECTION DUE TO INFLUENZA B CANNOT BE RULED OUT. BECAUSE THE ANTIGEN PRESENT IN THE SAMPLE MAY BE BELOW  THE DETECTION LIMIT OF THE TEST. A NEGATIVE TEST IS PRESUMPTIVE AND IT IS RECOMMENDED THAT THESE RESULTS BE CONFIRMED BY VIRAL CULTURE OR AN FDA-CLEARED INFLUENZA A AND B MOLECULAR ASSAY.          WET PREP [[id number]] Collected:  05/03/18 0934    Order Status:  Completed Specimen:  Vagina Updated:  05/03/18 0952     Special Requests: NO SPECIAL REQUESTS        Wet prep NO WBCS SEEN        Wet prep NO YEAST SEEN        Wet prep NO TRICHOMONAS SEEN        Wet prep --        FEW  CLUE CELLS PRESENT            Current Meds:  Current Facility-Administered Medications   Medication Dose Route Frequency    sertraline (ZOLOFT) tablet 100 mg  100 mg Oral DAILY    morphine injection 2 mg  2 mg IntraVENous Q4H PRN    ciprofloxacin HCl (CIPRO) tablet 500 mg  500 mg Oral Q12H    pantoprazole (PROTONIX) tablet 40 mg  40 mg Oral ACB    metroNIDAZOLE (FLAGYL) tablet 500 mg  500 mg Oral Q12H    sodium chloride (NS) flush 5-10 mL  5-10 mL IntraVENous Q8H    sodium chloride (NS) flush 5-10 mL  5-10 mL IntraVENous PRN    acetaminophen (TYLENOL) tablet 650 mg  650 mg Oral Q4H PRN    HYDROcodone-acetaminophen (NORCO) 5-325 mg per tablet 1 Tab  1 Tab Oral Q4H PRN    ondansetron (ZOFRAN) injection 4 mg  4 mg IntraVENous Q4H PRN    heparin (porcine) injection 5,000 Units  5,000 Units SubCUTAneous Q8H       Other Studies (last 24 hours):  Mri Pelv W Wo Cont    Result Date: 5/7/2018  MRI PELVIS WITH CONTRAST, 6 May 2018. HISTORY: Pelvic pain. Enlarged fibroids. Possible endometritis. TECHNIQUE: Axial sagittal and coronal T2, fat saturated axials before and following 10 cc intravenous gadolinium was given to increase sensitivity for inflammatory changes or adenopathy. FINDINGS: The uterus is enlarged. There are nabothian cysts. Heterogeneous focus in the posterior myometrium measures 5.2 x 7.6 x 4.5cm. This is low signal intensity on T2 images. It is hypoenhancing compared to the normal myometrium. This displaces the normal-appearing, thickness endometrium anteriorly. No significant endometrial enhancement following gadolinium. No abnormal high T1 signal precontrast to suggest an endometrioma.  The right ovary measures 2.4 x 3.5 cm on the coronal image and contains follicles. The left ovary measures 1.8 x 3.5 cm and also contains small follicles. No free fluid in the pelvis. There are gallstones. Biliary tree is not grossly dilated. IMPRESSION: 1) A 5.2 x 7.6 x 4.5 cm myometrial leiomyoma. This displaces the normal-appearing endometrium anteriorly. 2) Normal size ovaries with follicles. 3) No endometriomas identified. Assessment and Plan:     Hospital Problems as of 5/4/2018  Never Reviewed          Codes Class Noted - Resolved POA    Cholelithiases ICD-10-CM: K80.20  ICD-9-CM: 574.20  5/3/2018 - Present Unknown        Leukocytosis ICD-10-CM: D72.829  ICD-9-CM: 288.60  5/3/2018 - Present Unknown        * (Principal)Abdominal pain ICD-10-CM: R10.9  ICD-9-CM: 789.00  5/3/2018 - Present Unknown        History of hepatitis C ICD-10-CM: Z86.19  ICD-9-CM: V12.09  5/3/2018 - Present Unknown    Uti,bacterai vaginosis          PLAN:    abd pain- suprapubic and rt lower-?etiology- ct and pelvic ultrasound beningn findings-surgery- no surgical indication. gynecologist felt the pain from fibroids. May need hysterectomy if no pain relief.  MRI pelvis with contrast showed fibroids- gyn oncology was consulted by obgyn  uti- ciprofloxacin- urine culture <10,000 mixed yuni  Bacterial vaginosis- flagyl---- chlamydia and gonorrhea- negative  Hept c  cholelithiasis    DC planning/Dispo:    DVT ppx:  heparin    Signed:  Emerson Armando MD

## 2018-05-07 NOTE — ANESTHESIA PREPROCEDURE EVALUATION
Anesthetic History   No history of anesthetic complications            Review of Systems / Medical History  Patient summary reviewed and pertinent labs reviewed    Pulmonary          Smoker         Neuro/Psych   Within defined limits           Cardiovascular                  Exercise tolerance: >4 METS     GI/Hepatic/Renal           Liver disease    Comments: Hep C +--No treatment Endo/Other        Anemia     Other Findings            Physical Exam    Airway  Mallampati: II  TM Distance: > 6 cm  Neck ROM: normal range of motion   Mouth opening: Normal     Cardiovascular    Rhythm: regular           Dental    Dentition: Poor dentition     Pulmonary                 Abdominal  GI exam deferred       Other Findings            Anesthetic Plan    ASA: 3  Anesthesia type: general          Induction: Intravenous  Anesthetic plan and risks discussed with: Patient      TAP blocks discussed, pt agrees; no meth for 7 months

## 2018-05-07 NOTE — PROGRESS NOTES
Pt without new complaints today. States she feels a little better. No more fever / chills. Has started bleeding again lightly. No n/v. Denies cocaine / crack for \"many years\" and denies opioid use. MRI confirmed likely fibroid, no other abnormalities. Report reviewed. Blood pressure 131/85, pulse 73, temperature 97.8 °F (36.6 °C), resp. rate 18, height 5' 3\" (1.6 m), weight 170 lb (77.1 kg), SpO2 97 %. A&Ox3, NAD  Abd soft, bruising from sq heparin noted again. Tender lower abdomen, mainly suprapubic. Some guarding, no rebound  PLAN:  Principal Problem:    Possible Endometritis (5/4/2018)    Active Problems:    Cholelithiases (5/3/2018)      Leukocytosis (5/3/2018)      Pelvic pain (5/3/2018)      History of hepatitis C (5/3/2018)      UTI (urinary tract infection) (5/4/2018)      Bacterial vaginosis (5/4/2018)      LLQ pain (5/4/2018)      Hematuria (5/4/2018)      Intramural leiomyoma of uterus (5/6/2018)      Abdominal pain (5/6/2018)     Fibroids and likely endometritis, sees to be slowly improving. Although neither of these typically cause this degree of pain. Consult to Dr. Ryan Martínez who will see tomorrow for possible surgical assistance / options. Consider [facility] will discuss with Gyn Onc and IR. Time 20 minutes spent in the care of this patient today.

## 2018-05-08 ENCOUNTER — ANESTHESIA (OUTPATIENT)
Dept: SURGERY | Age: 38
DRG: 742 | End: 2018-05-08
Payer: SUBSIDIZED

## 2018-05-08 LAB
BASOPHILS # BLD: 0.1 K/UL (ref 0–0.2)
BASOPHILS NFR BLD: 1 % (ref 0–2)
DIFFERENTIAL METHOD BLD: ABNORMAL
EOSINOPHIL # BLD: 0.2 K/UL (ref 0–0.8)
EOSINOPHIL NFR BLD: 4 % (ref 0.5–7.8)
ERYTHROCYTE [DISTWIDTH] IN BLOOD BY AUTOMATED COUNT: 14.6 % (ref 11.9–14.6)
HCT VFR BLD AUTO: 28.8 % (ref 35.8–46.3)
HGB BLD-MCNC: 9.1 G/DL (ref 11.7–15.4)
IMM GRANULOCYTES # BLD: 0.1 K/UL (ref 0–0.5)
IMM GRANULOCYTES NFR BLD AUTO: 1 % (ref 0–5)
LYMPHOCYTES # BLD: 2 K/UL (ref 0.5–4.6)
LYMPHOCYTES NFR BLD: 28 % (ref 13–44)
MCH RBC QN AUTO: 25.1 PG (ref 26.1–32.9)
MCHC RBC AUTO-ENTMCNC: 31.6 G/DL (ref 31.4–35)
MCV RBC AUTO: 79.6 FL (ref 79.6–97.8)
MONOCYTES # BLD: 0.5 K/UL (ref 0.1–1.3)
MONOCYTES NFR BLD: 7 % (ref 4–12)
NEUTS SEG # BLD: 4.2 K/UL (ref 1.7–8.2)
NEUTS SEG NFR BLD: 59 % (ref 43–78)
PLATELET # BLD AUTO: 543 K/UL (ref 150–450)
PMV BLD AUTO: 8.8 FL (ref 10.8–14.1)
RBC # BLD AUTO: 3.62 M/UL (ref 4.05–5.25)
WBC # BLD AUTO: 6.9 K/UL (ref 4.3–11.1)

## 2018-05-08 PROCEDURE — 74011250636 HC RX REV CODE- 250/636: Performed by: FAMILY MEDICINE

## 2018-05-08 PROCEDURE — 77030016154: Performed by: OBSTETRICS & GYNECOLOGY

## 2018-05-08 PROCEDURE — 77030019908 HC STETH ESOPH SIMS -A: Performed by: ANESTHESIOLOGY

## 2018-05-08 PROCEDURE — 74011250636 HC RX REV CODE- 250/636: Performed by: ANESTHESIOLOGY

## 2018-05-08 PROCEDURE — 77030002966 HC SUT PDS J&J -A: Performed by: OBSTETRICS & GYNECOLOGY

## 2018-05-08 PROCEDURE — 74011250637 HC RX REV CODE- 250/637: Performed by: INTERNAL MEDICINE

## 2018-05-08 PROCEDURE — 77030011278 HC ELECTRD LIG IMPT COVD -F: Performed by: OBSTETRICS & GYNECOLOGY

## 2018-05-08 PROCEDURE — 77030032490 HC SLV COMPR SCD KNE COVD -B: Performed by: OBSTETRICS & GYNECOLOGY

## 2018-05-08 PROCEDURE — 74011250636 HC RX REV CODE- 250/636: Performed by: OBSTETRICS & GYNECOLOGY

## 2018-05-08 PROCEDURE — 74011000250 HC RX REV CODE- 250

## 2018-05-08 PROCEDURE — 77030003029 HC SUT VCRL J&J -B: Performed by: OBSTETRICS & GYNECOLOGY

## 2018-05-08 PROCEDURE — 77030003602 HC NDL NRV BLK BBMI -B: Performed by: ANESTHESIOLOGY

## 2018-05-08 PROCEDURE — 77030008477 HC STYL SATN SLP COVD -A: Performed by: ANESTHESIOLOGY

## 2018-05-08 PROCEDURE — 0UT90ZZ RESECTION OF UTERUS, OPEN APPROACH: ICD-10-PCS | Performed by: OBSTETRICS & GYNECOLOGY

## 2018-05-08 PROCEDURE — 74011250636 HC RX REV CODE- 250/636

## 2018-05-08 PROCEDURE — 74011000258 HC RX REV CODE- 258: Performed by: OBSTETRICS & GYNECOLOGY

## 2018-05-08 PROCEDURE — 88307 TISSUE EXAM BY PATHOLOGIST: CPT | Performed by: OBSTETRICS & GYNECOLOGY

## 2018-05-08 PROCEDURE — 74011250637 HC RX REV CODE- 250/637: Performed by: FAMILY MEDICINE

## 2018-05-08 PROCEDURE — 77030003028 HC SUT VCRL J&J -A: Performed by: OBSTETRICS & GYNECOLOGY

## 2018-05-08 PROCEDURE — 77030011264 HC ELECTRD BLD EXT COVD -A: Performed by: OBSTETRICS & GYNECOLOGY

## 2018-05-08 PROCEDURE — 76060000033 HC ANESTHESIA 1 TO 1.5 HR: Performed by: OBSTETRICS & GYNECOLOGY

## 2018-05-08 PROCEDURE — 0UT70ZZ RESECTION OF BILATERAL FALLOPIAN TUBES, OPEN APPROACH: ICD-10-PCS | Performed by: OBSTETRICS & GYNECOLOGY

## 2018-05-08 PROCEDURE — 77030020782 HC GWN BAIR PAWS FLX 3M -B: Performed by: ANESTHESIOLOGY

## 2018-05-08 PROCEDURE — 77030034850: Performed by: OBSTETRICS & GYNECOLOGY

## 2018-05-08 PROCEDURE — 77030008703 HC TU ET UNCUF COVD -A: Performed by: ANESTHESIOLOGY

## 2018-05-08 PROCEDURE — 85025 COMPLETE CBC W/AUTO DIFF WBC: CPT | Performed by: NURSE PRACTITIONER

## 2018-05-08 PROCEDURE — 76210000006 HC OR PH I REC 0.5 TO 1 HR: Performed by: OBSTETRICS & GYNECOLOGY

## 2018-05-08 PROCEDURE — 77030010507 HC ADH SKN DERMBND J&J -B: Performed by: OBSTETRICS & GYNECOLOGY

## 2018-05-08 PROCEDURE — 77030011640 HC PAD GRND REM COVD -A: Performed by: OBSTETRICS & GYNECOLOGY

## 2018-05-08 PROCEDURE — 76010000149 HC OR TIME 1 TO 1.5 HR: Performed by: OBSTETRICS & GYNECOLOGY

## 2018-05-08 PROCEDURE — 74011250637 HC RX REV CODE- 250/637: Performed by: ANESTHESIOLOGY

## 2018-05-08 PROCEDURE — 36415 COLL VENOUS BLD VENIPUNCTURE: CPT | Performed by: NURSE PRACTITIONER

## 2018-05-08 PROCEDURE — 77030027744 HC PWDR HEMSTAT ARISTA ABSRB 5GM BARD -D: Performed by: OBSTETRICS & GYNECOLOGY

## 2018-05-08 PROCEDURE — 77030031139 HC SUT VCRL2 J&J -A: Performed by: OBSTETRICS & GYNECOLOGY

## 2018-05-08 PROCEDURE — 65270000029 HC RM PRIVATE

## 2018-05-08 RX ORDER — ONDANSETRON 2 MG/ML
INJECTION INTRAMUSCULAR; INTRAVENOUS AS NEEDED
Status: DISCONTINUED | OUTPATIENT
Start: 2018-05-08 | End: 2018-05-08 | Stop reason: HOSPADM

## 2018-05-08 RX ORDER — NALOXONE HYDROCHLORIDE 0.4 MG/ML
0.4 INJECTION, SOLUTION INTRAMUSCULAR; INTRAVENOUS; SUBCUTANEOUS AS NEEDED
Status: DISCONTINUED | OUTPATIENT
Start: 2018-05-08 | End: 2018-05-09 | Stop reason: HOSPADM

## 2018-05-08 RX ORDER — GABAPENTIN 300 MG/1
600 CAPSULE ORAL
Status: COMPLETED | OUTPATIENT
Start: 2018-05-08 | End: 2018-05-08

## 2018-05-08 RX ORDER — NALOXONE HYDROCHLORIDE 0.4 MG/ML
0.1 INJECTION, SOLUTION INTRAMUSCULAR; INTRAVENOUS; SUBCUTANEOUS
Status: DISCONTINUED | OUTPATIENT
Start: 2018-05-08 | End: 2018-05-08 | Stop reason: HOSPADM

## 2018-05-08 RX ORDER — DIPHENHYDRAMINE HYDROCHLORIDE 50 MG/ML
12.5 INJECTION, SOLUTION INTRAMUSCULAR; INTRAVENOUS
Status: DISCONTINUED | OUTPATIENT
Start: 2018-05-08 | End: 2018-05-09 | Stop reason: HOSPADM

## 2018-05-08 RX ORDER — AMOXICILLIN 250 MG
2 CAPSULE ORAL DAILY
Status: DISCONTINUED | OUTPATIENT
Start: 2018-05-09 | End: 2018-05-09 | Stop reason: HOSPADM

## 2018-05-08 RX ORDER — SODIUM CHLORIDE, SODIUM LACTATE, POTASSIUM CHLORIDE, CALCIUM CHLORIDE 600; 310; 30; 20 MG/100ML; MG/100ML; MG/100ML; MG/100ML
100 INJECTION, SOLUTION INTRAVENOUS CONTINUOUS
Status: DISCONTINUED | OUTPATIENT
Start: 2018-05-08 | End: 2018-05-08

## 2018-05-08 RX ORDER — PROPOFOL 10 MG/ML
INJECTION, EMULSION INTRAVENOUS AS NEEDED
Status: DISCONTINUED | OUTPATIENT
Start: 2018-05-08 | End: 2018-05-08 | Stop reason: HOSPADM

## 2018-05-08 RX ORDER — ACETAMINOPHEN 10 MG/ML
INJECTION, SOLUTION INTRAVENOUS AS NEEDED
Status: DISCONTINUED | OUTPATIENT
Start: 2018-05-08 | End: 2018-05-08 | Stop reason: HOSPADM

## 2018-05-08 RX ORDER — FENTANYL CITRATE 50 UG/ML
INJECTION, SOLUTION INTRAMUSCULAR; INTRAVENOUS AS NEEDED
Status: DISCONTINUED | OUTPATIENT
Start: 2018-05-08 | End: 2018-05-08 | Stop reason: HOSPADM

## 2018-05-08 RX ORDER — BUPIVACAINE HYDROCHLORIDE 5 MG/ML
INJECTION, SOLUTION EPIDURAL; INTRACAUDAL AS NEEDED
Status: DISCONTINUED | OUTPATIENT
Start: 2018-05-08 | End: 2018-05-08 | Stop reason: HOSPADM

## 2018-05-08 RX ORDER — SODIUM CHLORIDE, SODIUM LACTATE, POTASSIUM CHLORIDE, CALCIUM CHLORIDE 600; 310; 30; 20 MG/100ML; MG/100ML; MG/100ML; MG/100ML
75 INJECTION, SOLUTION INTRAVENOUS CONTINUOUS
Status: DISCONTINUED | OUTPATIENT
Start: 2018-05-08 | End: 2018-05-08 | Stop reason: HOSPADM

## 2018-05-08 RX ORDER — MORPHINE SULFATE 4 MG/ML
2 INJECTION, SOLUTION INTRAMUSCULAR; INTRAVENOUS
Status: DISCONTINUED | OUTPATIENT
Start: 2018-05-08 | End: 2018-05-09

## 2018-05-08 RX ORDER — HEPARIN SODIUM 5000 [USP'U]/ML
5000 INJECTION, SOLUTION INTRAVENOUS; SUBCUTANEOUS EVERY 8 HOURS
Status: DISCONTINUED | OUTPATIENT
Start: 2018-05-08 | End: 2018-05-08

## 2018-05-08 RX ORDER — BUPIVACAINE HYDROCHLORIDE 2.5 MG/ML
INJECTION, SOLUTION EPIDURAL; INFILTRATION; INTRACAUDAL AS NEEDED
Status: DISCONTINUED | OUTPATIENT
Start: 2018-05-08 | End: 2018-05-08 | Stop reason: HOSPADM

## 2018-05-08 RX ORDER — ONDANSETRON 4 MG/1
4 TABLET, ORALLY DISINTEGRATING ORAL
Status: DISCONTINUED | OUTPATIENT
Start: 2018-05-08 | End: 2018-05-09 | Stop reason: HOSPADM

## 2018-05-08 RX ORDER — SODIUM CHLORIDE 0.9 % (FLUSH) 0.9 %
5-10 SYRINGE (ML) INJECTION AS NEEDED
Status: DISCONTINUED | OUTPATIENT
Start: 2018-05-08 | End: 2018-05-08 | Stop reason: HOSPADM

## 2018-05-08 RX ORDER — KETOROLAC TROMETHAMINE 30 MG/ML
INJECTION, SOLUTION INTRAMUSCULAR; INTRAVENOUS AS NEEDED
Status: DISCONTINUED | OUTPATIENT
Start: 2018-05-08 | End: 2018-05-08 | Stop reason: HOSPADM

## 2018-05-08 RX ORDER — CEFAZOLIN SODIUM IN 0.9 % NACL 2 G/50 ML
2 INTRAVENOUS SOLUTION, PIGGYBACK (ML) INTRAVENOUS ONCE
Status: DISCONTINUED | OUTPATIENT
Start: 2018-05-08 | End: 2018-05-08

## 2018-05-08 RX ORDER — IBUPROFEN 800 MG/1
800 TABLET ORAL 3 TIMES DAILY
Status: DISCONTINUED | OUTPATIENT
Start: 2018-05-08 | End: 2018-05-09 | Stop reason: HOSPADM

## 2018-05-08 RX ORDER — HEPARIN SODIUM 5000 [USP'U]/ML
5000 INJECTION, SOLUTION INTRAVENOUS; SUBCUTANEOUS ONCE
Status: COMPLETED | OUTPATIENT
Start: 2018-05-08 | End: 2018-05-08

## 2018-05-08 RX ORDER — SODIUM CHLORIDE 0.9 % (FLUSH) 0.9 %
5-10 SYRINGE (ML) INJECTION AS NEEDED
Status: DISCONTINUED | OUTPATIENT
Start: 2018-05-08 | End: 2018-05-09 | Stop reason: HOSPADM

## 2018-05-08 RX ORDER — FLUMAZENIL 0.1 MG/ML
0.2 INJECTION INTRAVENOUS
Status: DISCONTINUED | OUTPATIENT
Start: 2018-05-08 | End: 2018-05-08 | Stop reason: HOSPADM

## 2018-05-08 RX ORDER — CEFAZOLIN SODIUM/WATER 2 G/20 ML
2 SYRINGE (ML) INTRAVENOUS ONCE
Status: COMPLETED | OUTPATIENT
Start: 2018-05-08 | End: 2018-05-08

## 2018-05-08 RX ORDER — SODIUM CHLORIDE 0.9 % (FLUSH) 0.9 %
5-10 SYRINGE (ML) INJECTION EVERY 8 HOURS
Status: DISCONTINUED | OUTPATIENT
Start: 2018-05-08 | End: 2018-05-09 | Stop reason: HOSPADM

## 2018-05-08 RX ORDER — OXYCODONE HYDROCHLORIDE 5 MG/1
10 TABLET ORAL
Status: DISCONTINUED | OUTPATIENT
Start: 2018-05-08 | End: 2018-05-09 | Stop reason: HOSPADM

## 2018-05-08 RX ORDER — SODIUM CHLORIDE, SODIUM LACTATE, POTASSIUM CHLORIDE, CALCIUM CHLORIDE 600; 310; 30; 20 MG/100ML; MG/100ML; MG/100ML; MG/100ML
125 INJECTION, SOLUTION INTRAVENOUS CONTINUOUS
Status: DISCONTINUED | OUTPATIENT
Start: 2018-05-08 | End: 2018-05-09

## 2018-05-08 RX ORDER — LORAZEPAM 1 MG/1
1 TABLET ORAL
Status: DISCONTINUED | OUTPATIENT
Start: 2018-05-08 | End: 2018-05-09 | Stop reason: HOSPADM

## 2018-05-08 RX ORDER — ROCURONIUM BROMIDE 10 MG/ML
INJECTION, SOLUTION INTRAVENOUS AS NEEDED
Status: DISCONTINUED | OUTPATIENT
Start: 2018-05-08 | End: 2018-05-08 | Stop reason: HOSPADM

## 2018-05-08 RX ORDER — HYDROMORPHONE HYDROCHLORIDE 2 MG/ML
0.5 INJECTION, SOLUTION INTRAMUSCULAR; INTRAVENOUS; SUBCUTANEOUS
Status: COMPLETED | OUTPATIENT
Start: 2018-05-08 | End: 2018-05-08

## 2018-05-08 RX ORDER — LIDOCAINE HYDROCHLORIDE 20 MG/ML
INJECTION, SOLUTION EPIDURAL; INFILTRATION; INTRACAUDAL; PERINEURAL AS NEEDED
Status: DISCONTINUED | OUTPATIENT
Start: 2018-05-08 | End: 2018-05-08 | Stop reason: HOSPADM

## 2018-05-08 RX ORDER — DEXAMETHASONE SODIUM PHOSPHATE 4 MG/ML
INJECTION, SOLUTION INTRA-ARTICULAR; INTRALESIONAL; INTRAMUSCULAR; INTRAVENOUS; SOFT TISSUE AS NEEDED
Status: DISCONTINUED | OUTPATIENT
Start: 2018-05-08 | End: 2018-05-08 | Stop reason: HOSPADM

## 2018-05-08 RX ORDER — KETAMINE HYDROCHLORIDE 50 MG/ML
INJECTION, SOLUTION INTRAMUSCULAR; INTRAVENOUS AS NEEDED
Status: DISCONTINUED | OUTPATIENT
Start: 2018-05-08 | End: 2018-05-08 | Stop reason: HOSPADM

## 2018-05-08 RX ADMIN — PROPOFOL 200 MG: 10 INJECTION, EMULSION INTRAVENOUS at 17:01

## 2018-05-08 RX ADMIN — LIDOCAINE HYDROCHLORIDE 100 MG: 20 INJECTION, SOLUTION EPIDURAL; INFILTRATION; INTRACAUDAL; PERINEURAL at 17:01

## 2018-05-08 RX ADMIN — SERTRALINE HYDROCHLORIDE 100 MG: 100 TABLET ORAL at 08:58

## 2018-05-08 RX ADMIN — METRONIDAZOLE 500 MG: 500 TABLET ORAL at 08:58

## 2018-05-08 RX ADMIN — HYDROCODONE BITARTRATE AND ACETAMINOPHEN 1 TABLET: 5; 325 TABLET ORAL at 05:10

## 2018-05-08 RX ADMIN — HYDROMORPHONE HYDROCHLORIDE 0.5 MG: 2 INJECTION, SOLUTION INTRAMUSCULAR; INTRAVENOUS; SUBCUTANEOUS at 18:56

## 2018-05-08 RX ADMIN — ONDANSETRON 4 MG: 2 INJECTION INTRAMUSCULAR; INTRAVENOUS at 17:42

## 2018-05-08 RX ADMIN — HYDROMORPHONE HYDROCHLORIDE 0.5 MG: 2 INJECTION, SOLUTION INTRAMUSCULAR; INTRAVENOUS; SUBCUTANEOUS at 19:09

## 2018-05-08 RX ADMIN — MORPHINE SULFATE 2 MG: 4 INJECTION, SOLUTION INTRAMUSCULAR; INTRAVENOUS at 08:58

## 2018-05-08 RX ADMIN — MORPHINE SULFATE 2 MG: 4 INJECTION, SOLUTION INTRAMUSCULAR; INTRAVENOUS at 20:59

## 2018-05-08 RX ADMIN — DEXAMETHASONE SODIUM PHOSPHATE 4 MG: 4 INJECTION, SOLUTION INTRA-ARTICULAR; INTRALESIONAL; INTRAMUSCULAR; INTRAVENOUS; SOFT TISSUE at 17:42

## 2018-05-08 RX ADMIN — CEFAZOLIN SODIUM 1 G: 1 INJECTION, POWDER, FOR SOLUTION INTRAMUSCULAR; INTRAVENOUS at 21:00

## 2018-05-08 RX ADMIN — KETAMINE HYDROCHLORIDE 40 MG: 50 INJECTION, SOLUTION INTRAMUSCULAR; INTRAVENOUS at 17:29

## 2018-05-08 RX ADMIN — ROCURONIUM BROMIDE 50 MG: 10 INJECTION, SOLUTION INTRAVENOUS at 17:01

## 2018-05-08 RX ADMIN — ACETAMINOPHEN 1000 MG: 10 INJECTION, SOLUTION INTRAVENOUS at 17:44

## 2018-05-08 RX ADMIN — HYDROMORPHONE HYDROCHLORIDE 0.5 MG: 2 INJECTION, SOLUTION INTRAMUSCULAR; INTRAVENOUS; SUBCUTANEOUS at 18:35

## 2018-05-08 RX ADMIN — KETOROLAC TROMETHAMINE 30 MG: 30 INJECTION, SOLUTION INTRAMUSCULAR; INTRAVENOUS at 17:42

## 2018-05-08 RX ADMIN — BUPIVACAINE HYDROCHLORIDE 20 ML: 2.5 INJECTION, SOLUTION EPIDURAL; INFILTRATION; INTRACAUDAL at 17:13

## 2018-05-08 RX ADMIN — HEPARIN SODIUM 5000 UNITS: 5000 INJECTION, SOLUTION INTRAVENOUS; SUBCUTANEOUS at 12:15

## 2018-05-08 RX ADMIN — GABAPENTIN 600 MG: 300 CAPSULE ORAL at 16:19

## 2018-05-08 RX ADMIN — MORPHINE SULFATE 2 MG: 4 INJECTION, SOLUTION INTRAMUSCULAR; INTRAVENOUS at 20:55

## 2018-05-08 RX ADMIN — SODIUM CHLORIDE, SODIUM LACTATE, POTASSIUM CHLORIDE, AND CALCIUM CHLORIDE: 600; 310; 30; 20 INJECTION, SOLUTION INTRAVENOUS at 17:58

## 2018-05-08 RX ADMIN — ACETAMINOPHEN 650 MG: 325 TABLET ORAL at 09:00

## 2018-05-08 RX ADMIN — SODIUM CHLORIDE, SODIUM LACTATE, POTASSIUM CHLORIDE, AND CALCIUM CHLORIDE 100 ML/HR: 600; 310; 30; 20 INJECTION, SOLUTION INTRAVENOUS at 11:24

## 2018-05-08 RX ADMIN — Medication 2 G: at 11:23

## 2018-05-08 RX ADMIN — PANTOPRAZOLE SODIUM 40 MG: 40 TABLET, DELAYED RELEASE ORAL at 05:04

## 2018-05-08 RX ADMIN — HYDROMORPHONE HYDROCHLORIDE 0.5 MG: 2 INJECTION, SOLUTION INTRAMUSCULAR; INTRAVENOUS; SUBCUTANEOUS at 18:45

## 2018-05-08 RX ADMIN — SODIUM CHLORIDE, SODIUM LACTATE, POTASSIUM CHLORIDE, AND CALCIUM CHLORIDE 125 ML/HR: 600; 310; 30; 20 INJECTION, SOLUTION INTRAVENOUS at 21:00

## 2018-05-08 RX ADMIN — FENTANYL CITRATE 100 MCG: 50 INJECTION, SOLUTION INTRAMUSCULAR; INTRAVENOUS at 17:01

## 2018-05-08 RX ADMIN — CIPROFLOXACIN 500 MG: 500 TABLET, FILM COATED ORAL at 08:58

## 2018-05-08 RX ADMIN — LIDOCAINE HYDROCHLORIDE 10 ML: 20 INJECTION, SOLUTION EPIDURAL; INFILTRATION; INTRACAUDAL; PERINEURAL at 17:13

## 2018-05-08 RX ADMIN — Medication 10 ML: at 22:00

## 2018-05-08 RX ADMIN — BUPIVACAINE HYDROCHLORIDE 20 ML: 5 INJECTION, SOLUTION EPIDURAL; INTRACAUDAL at 17:13

## 2018-05-08 NOTE — PROGRESS NOTES
Problem: Falls - Risk of  Goal: *Absence of Falls  Document Tish Fall Risk and appropriate interventions in the flowsheet.    Outcome: Progressing Towards Goal  Fall Risk Interventions:            Medication Interventions: Assess postural VS orthostatic hypotension, Patient to call before getting OOB, Teach patient to arise slowly    Elimination Interventions: Call light in reach, Patient to call for help with toileting needs, Toilet paper/wipes in reach, Toileting schedule/hourly rounds

## 2018-05-08 NOTE — BRIEF OP NOTE
BRIEF OPERATIVE NOTE    Date of Procedure: 5/8/2018   Preoperative Diagnosis: fibroid  Postoperative Diagnosis: fibroid    Procedure(s):  TOTAL ABDOMINAL  HYSTERECTOMY  (BALBINA) with BILATERAL SALPINGECTOMY  Surgeon(s) and Role:     * Harsh Mandel MD - Primary         Surgical Assistant:    Surgical Staff:  Circ-1: Rashmi Gamez RN  Scrub Tech-1: Des Somers  Scrub Tech-2: Miguelangel Rock  Scrub Tech-Relief: Jaja Carlisle  Event Time In   Incision Start 1714   Incision Close      Anesthesia: General   Estimated Blood Loss: 75 ml  Specimens:   ID Type Source Tests Collected by Time Destination   1 : Uterus with Bilateral Fallopian Tubes Fresh Uterus with Bilateral Fallopian Tubes  Harsh Mandel MD 5/8/2018 6261 Pathology      Findings: large uterus consistant with leiomyoma  Complications: none  Implants: * No implants in log *

## 2018-05-08 NOTE — PERIOP NOTES
Emmy Lazaro RN taking care of patient on 6th floor, states she spoke with Dr. Dianna Joaquin who will assess need pre op antibiotic.

## 2018-05-08 NOTE — PERIOP NOTES
TRANSFER - IN REPORT:    Verbal report received from Rudy, [place] (name) on Elois Leventhal  being received from 6th floor(unit) for ordered procedure      Report consisted of patients Situation, Background, Assessment and   Recommendations(SBAR). Information from the following report(s) SBAR and MAR was reviewed with the receiving nurse. Opportunity for questions and clarification was provided. Assessment completed upon patients arrival to unit and care assumed. Pt arrived to pre op alert and oriented.

## 2018-05-08 NOTE — ANESTHESIA PROCEDURE NOTES
Peripheral Block    Start time: 5/8/2018 5:04 PM  End time: 5/8/2018 5:13 PM  Performed by: Aisha Ghosh  Authorized by: Aisha Ghosh       Pre-procedure:    Indications: at surgeon's request and post-op pain management    Preanesthetic Checklist: patient identified, risks and benefits discussed, site marked, timeout performed, anesthesia consent given and patient being monitored    Timeout Time: 17:04          Block Type:   Block Type:  TAP  Laterality:  Right and left  Monitoring:  Standard ASA monitoring, continuous pulse ox, frequent vital sign checks, heart rate and oxygen  Injection Technique:  Single shot  Procedures: ultrasound guided    Patient Position: supine  Prep: chlorhexidine    Location:  Abdominal  Needle Type:  Stimuplex  Needle Gauge:  22 G  Needle Localization:  Nerve stimulator and ultrasound guidance  Motor Response: minimal motor response >0.4 mA    Medication Injected:  0.375%  bupivacaine  Volume (mL):  40  Add'l Medication Injected:  2.0%  lidocaine  Volume (mL):  10    Assessment:  Number of attempts:  1  Injection Assessment:  Local visualized surrounding nerve on ultrasound, negative aspiration for CSF, negative aspiration for blood, no paresthesia, no intravascular symptoms, ultrasound image on chart and incremental injection every 5 mL  Patient tolerance:  Patient tolerated the procedure well with no immediate complications  25 ml each side

## 2018-05-08 NOTE — PROGRESS NOTES
Hourly round completed throughout the shift. Bed in lower position and call light/ personal items within reach. Will continue to monitor and give bed side shift report to on coming day shift nurse.

## 2018-05-08 NOTE — PROGRESS NOTES
TRANSFER - OUT REPORT:    Verbal report given to Sj Cuevas RN on Navi Calabrese  being transferred to Preop for ordered procedure       Report consisted of patients Situation, Background, Assessment and   Recommendations(SBAR). Information from the following report(s) SBAR was reviewed with the receiving nurse. Lines:   Peripheral IV 05/05/18 Right Forearm (Active)   Site Assessment Clean, dry, & intact 5/8/2018  7:00 AM   Phlebitis Assessment 0 5/8/2018  7:00 AM   Infiltration Assessment 0 5/8/2018  7:00 AM   Dressing Status Clean, dry, & intact 5/8/2018  7:00 AM   Dressing Type Tape;Transparent 5/8/2018  7:00 AM   Hub Color/Line Status Flushed 5/8/2018  7:00 AM        Opportunity for questions and clarification was provided.

## 2018-05-08 NOTE — PROGRESS NOTES
Interdisciplinary Rounds completed 05/08/18. Nursing, Case Management, Physician and PT present. Plan of care reviewed and updated.

## 2018-05-08 NOTE — PROGRESS NOTES
Hospitalist Progress Note    2018  Admit Date: 5/3/2018  6:55 AM   NAME: Pushpa Galloway   :  1980   DOS:              18  MRN:  [id number]   Attending: Keke Morgan MD  PCP:  None  Treatment Team: Attending Provider: Chris Lewis MD; Utilization Review: Roger Martinez RN; Consulting Provider: Kavin Allan MD; Utilization Review: Blanka Whiteside RN; Consulting Provider: Gregory Salazar MD    Full Code     SUBJECTIVE:   As previously documented: 44yo F with PMH of Hepatitis C presents with moderate to severe abdominal pain x1 week. She reports pain is mostly in lower abdomen and right quadrants and is associated with intermittent nausea and emesis. LMP was last week and she reports menses typically heavy with clots. In the ER, she is afebrile and normotensive. Her labs are pertinent for :  WBC 27K, HGB 9.6 and are otherwise insignificant. GYN evaluated patient who recommended MRI pelvis that was showing myometrial leiomyoma. GYN-ONC recommended surgery due to patient failed conservative management. 18    Judi Flood stated abdominal pain is improving today. 10+ ROS reviewed and negative except for positive in HPI.    No Known Allergies  Current Facility-Administered Medications   Medication Dose Route Frequency    lactated Ringers infusion  100 mL/hr IntraVENous CONTINUOUS    sertraline (ZOLOFT) tablet 100 mg  100 mg Oral DAILY    morphine injection 2 mg  2 mg IntraVENous Q4H PRN    pantoprazole (PROTONIX) tablet 40 mg  40 mg Oral ACB    sodium chloride (NS) flush 5-10 mL  5-10 mL IntraVENous Q8H    sodium chloride (NS) flush 5-10 mL  5-10 mL IntraVENous PRN    acetaminophen (TYLENOL) tablet 650 mg  650 mg Oral Q4H PRN    HYDROcodone-acetaminophen (NORCO) 5-325 mg per tablet 1 Tab  1 Tab Oral Q4H PRN    ondansetron (ZOFRAN) injection 4 mg  4 mg IntraVENous Q4H PRN     Facility-Administered Medications Ordered in Other Encounters Medication Dose Route Frequency    bupivacaine (PF) (MARCAINE) 0.5 % (5 mg/mL) injection   Peripheral Nerve Block PRN    lidocaine (PF) (XYLOCAINE) 20 mg/mL (2 %) injection   Peripheral Nerve Block PRN    bupivacaine (PF) (MARCAINE) 0.25 % (2.5 mg/mL) injection   Peripheral Nerve Block PRN    fentaNYL citrate (PF) injection   IntraVENous PRN    lidocaine (PF) (XYLOCAINE) 20 mg/mL (2 %) injection   IntraVENous PRN    propofol (DIPRIVAN) 10 mg/mL injection   IntraVENous PRN    rocuronium (ZEMURON) injection    PRN           There is no immunization history on file for this patient. Objective:   Patient Vitals for the past 24 hrs:   Temp Pulse Resp BP SpO2   18 1509 98 °F (36.7 °C) 71 18 134/72 97 %   18 1031 97.7 °F (36.5 °C) 66 18 117/77 96 %   18 0716 97.6 °F (36.4 °C) 65 18 119/77 96 %   18 0441 97.7 °F (36.5 °C) 77 19 121/79 96 %   18 0006 97.7 °F (36.5 °C) 92 19 111/73 96 %   18 2035 97.8 °F (36.6 °C) 79 20 134/78 98 %     Temp (24hrs), Av.8 °F (36.6 °C), Min:97.6 °F (36.4 °C), Max:98 °F (36.7 °C)    Oxygen Therapy  O2 Sat (%): 97 % (18 1509)  Pulse via Oximetry: 86 beats per minute (18 2206)  O2 Device: Room air (18 1509)  Oxygen Therapy  O2 Sat (%): 97 % (18 1509)  Pulse via Oximetry: 86 beats per minute (18 2206)  O2 Device: Room air (18 1509)    Physical Exam:  General:         Alert, cooperative, no distress   HEENT:               NCAT. No obvious deformity. Nares normal.  Lungs: No wheezing/rhonchi/rales  Cardiovascular:   RRR. No m/r/g. No pedal edema b/l. +2 PT/DT pulses b/l. Abdomen:       Tenderness on suprapubic region. No guarding or rebound. (+) BS. soft  Skin:         No rashes or lesions. Not Jaundiced  Neurologic:     No gross focal deficit. Psychiatric:         Good mood. Normal affect.               DIAGNOSTIC STUDIES      Data Review:   Recent Results (from the past 24 hour(s))   TYPE & SCREEN    Collection Time: 05/07/18  5:40 PM   Result Value Ref Range    Crossmatch Expiration 05/10/2018     ABO/Rh(D) A POSITIVE     Antibody screen NEG    HIV-1,2 P24 AG/AB SCREEN    Collection Time: 05/07/18  5:42 PM   Result Value Ref Range    p24 Antigen NONREACTIVE NR      HIV-1,2 Ab NONREACTIVE NR     CBC WITH AUTOMATED DIFF    Collection Time: 05/08/18  5:26 AM   Result Value Ref Range    WBC 6.9 4.3 - 11.1 K/uL    RBC 3.62 (L) 4.05 - 5.25 M/uL    HGB 9.1 (L) 11.7 - 15.4 g/dL    HCT 28.8 (L) 35.8 - 46.3 %    MCV 79.6 79.6 - 97.8 FL    MCH 25.1 (L) 26.1 - 32.9 PG    MCHC 31.6 31.4 - 35.0 g/dL    RDW 14.6 11.9 - 14.6 %    PLATELET 984 (H) 473 - 450 K/uL    MPV 8.8 (L) 10.8 - 14.1 FL    DF AUTOMATED      NEUTROPHILS 59 43 - 78 %    LYMPHOCYTES 28 13 - 44 %    MONOCYTES 7 4.0 - 12.0 %    EOSINOPHILS 4 0.5 - 7.8 %    BASOPHILS 1 0.0 - 2.0 %    IMMATURE GRANULOCYTES 1 0.0 - 5.0 %    ABS. NEUTROPHILS 4.2 1.7 - 8.2 K/UL    ABS. LYMPHOCYTES 2.0 0.5 - 4.6 K/UL    ABS. MONOCYTES 0.5 0.1 - 1.3 K/UL    ABS. EOSINOPHILS 0.2 0.0 - 0.8 K/UL    ABS. BASOPHILS 0.1 0.0 - 0.2 K/UL    ABS. IMM. GRANS. 0.1 0.0 - 0.5 K/UL       All Micro Results     Procedure Component Value Units Date/Time    CULTURE, URINE [[id number]] Collected:  05/03/18 2022    Order Status:  Completed Specimen:  Urine from Clean catch Updated:  05/06/18 0802     Special Requests: NO SPECIAL REQUESTS        Culture result:       <10,000  COLONIES/mL  NORMAL SKIN FAHAD ISOLATED      INFLUENZA A & B AG (RAPID TEST) [[id number]] Collected:  05/03/18 1553    Order Status:  Completed Specimen:  Nasopharyngeal from Nasal washing Updated:  05/03/18 1619     Influenza A Ag NEGATIVE          NEGATIVE FOR THE PRESENCE OF INFLUENZA A ANTIGEN  INFECTION DUE TO INFLUENZA A CANNOT BE RULED OUT. BECAUSE THE ANTIGEN PRESENT IN THE SAMPLE MAY BE BELOW  THE DETECTION LIMIT OF THE TEST.   A NEGATIVE TEST IS PRESUMPTIVE AND IT IS RECOMMENDED THAT THESE RESULTS BE CONFIRMED BY VIRAL CULTURE OR AN FDA-CLEARED INFLUENZA A AND B MOLECULAR ASSAY. Influenza B Ag NEGATIVE          NEGATIVE FOR THE PRESENCE OF INFLUENZA B ANTIGEN  INFECTION DUE TO INFLUENZA B CANNOT BE RULED OUT. BECAUSE THE ANTIGEN PRESENT IN THE SAMPLE MAY BE BELOW  THE DETECTION LIMIT OF THE TEST. A NEGATIVE TEST IS PRESUMPTIVE AND IT IS RECOMMENDED THAT THESE RESULTS BE CONFIRMED BY VIRAL CULTURE OR AN FDA-CLEARED INFLUENZA A AND B MOLECULAR ASSAY. WET PREP [[id number]] Collected:  05/03/18 0934    Order Status:  Completed Specimen:  Vagina Updated:  05/03/18 0952     Special Requests: NO SPECIAL REQUESTS        Wet prep NO WBCS SEEN        Wet prep NO YEAST SEEN        Wet prep NO TRICHOMONAS SEEN        Wet prep --        FEW  CLUE CELLS PRESENT            Imaging /Procedures /Studies:    CXR Results  (Last 48 hours)    None        CT Results  (Last 48 hours)    None        No results found. No results found for this visit on 05/03/18.     Labs and Studies from previous 24 hours have been personally reviewed by myself    ASSESSMENT      Active Hospital Problems    Diagnosis Date Noted    Intramural leiomyoma of uterus 05/06/2018    Abdominal pain 05/06/2018    UTI (urinary tract infection) 05/04/2018    Bacterial vaginosis 05/04/2018    Possible Endometritis 05/04/2018    LLQ pain 05/04/2018    Hematuria 05/04/2018    Cholelithiases 05/03/2018    Leukocytosis 05/03/2018    Pelvic pain 05/03/2018    History of hepatitis C 05/03/2018     Hospital Problems as of 5/8/2018  Date Reviewed: 5/6/2018          Codes Class Noted - Resolved POA    Intramural leiomyoma of uterus ICD-10-CM: D25.1  ICD-9-CM: 218.1  5/6/2018 - Present Yes        * (Principal)Abdominal pain ICD-10-CM: R10.9  ICD-9-CM: 789.00  5/6/2018 - Present Yes        UTI (urinary tract infection) ICD-10-CM: N39.0  ICD-9-CM: 599.0  5/4/2018 - Present Yes        Bacterial vaginosis ICD-10-CM: N76.0, B96.89  ICD-9-CM: 616.10, 041.9  5/4/2018 - Present Yes        Possible Endometritis ICD-10-CM: N71.9  ICD-9-CM: 615.9  5/4/2018 - Present Yes        LLQ pain ICD-10-CM: R10.32  ICD-9-CM: 789.04  5/4/2018 - Present Yes        Hematuria ICD-10-CM: R31.9  ICD-9-CM: 599.70  5/4/2018 - Present Yes        Cholelithiases ICD-10-CM: K80.20  ICD-9-CM: 574.20  5/3/2018 - Present Yes        Leukocytosis ICD-10-CM: L54.009  ICD-9-CM: 288.60  5/3/2018 - Present Yes        Pelvic pain ICD-10-CM: R10.2  ICD-9-CM: [id number]  5/3/2018 - Present Yes        History of hepatitis C ICD-10-CM: Z86.19  ICD-9-CM: V12.09  5/3/2018 - Present Yes              A/P:    -Abdominal pain  Likely secondary to fibroids  GYN eval appreciated  Patient to go for surgery today  Supportive care    -Bacterial vaginosis  Clam/GC neg  S/p flagyl    - suspected UTI  Neg ucx  Abxs stopped    -Anemia  Likely secondary to heavy menses due to problem as above  Hb has been stable  Send iron studies    DVT Prophylaxis: heparin  CODE Status: Full  Plan of Care Discussed with: patient.  Care team.      Raquel Lopez MD  05/08/18

## 2018-05-08 NOTE — PERIOP NOTES
TRANSFER - OUT REPORT:    Verbal report given to MARCO Bradford on Óscar Peñaloza  being transferred to [facility] for routine post - op       Report consisted of patients Situation, Background, Assessment and   Recommendations(SBAR). Information from the following report(s) Procedure Summary, Intake/Output, MAR and Cardiac Rhythm NSR was reviewed with the receiving nurse. Lines:   Peripheral IV 05/05/18 Right Forearm (Active)   Site Assessment Clean, dry, & intact 5/8/2018  7:05 PM   Phlebitis Assessment 0 5/8/2018  7:05 PM   Infiltration Assessment 0 5/8/2018  7:05 PM   Dressing Status Clean, dry, & intact 5/8/2018  6:15 PM   Dressing Type Tape;Transparent 5/8/2018  7:05 PM   Hub Color/Line Status Pink; Infusing 5/8/2018  7:05 PM        Opportunity for questions and clarification was provided. Patient transported with:   Tech    VTE prophylaxis orders have been written for Óscar Peñaloza. Patient and family given floor number and nurses name. Family updated re: pt status after security code verified.

## 2018-05-08 NOTE — PROGRESS NOTES
Cc  Leiomyoma  Severe pelvic pain, persistatn  Leukocytosis, resolved. Hx da  Hepatitis c    HPI:  41 yo mulitp with hx btl, presented and admitted through er with severe pelvic pain and leukocytosis. Admitted, managed withabx, leukocytosis resolved, pain continueed. Pt with hx menorrhagia and anemia, incarcerated recently and reports limited health care. Reported da in past, thc on er drug screen. Had btl in past, chronic anemia  Hep c , no interventions, hiv neg. Review of Systems   Constitutional: Negative. HENT: Negative. Eyes: Negative. Respiratory: Negative. Cardiovascular: Negative. Gastrointestinal: Positive for abdominal pain. Genitourinary: Negative. Musculoskeletal: Negative. Skin: Negative. Neurological: Negative. Endo/Heme/Allergies: Negative. Psychiatric/Behavioral: Negative. All other systems reviewed and are negative. No Known Allergies  Past Medical History:   Diagnosis Date    Abnormal Pap smear of cervix     Drug abuse     Imprisoned in [place] d/t cocaine possession (allegedly)    Gall stones     Hepatitis C      Past Surgical History:   Procedure Laterality Date    BREAST SURGERY PROCEDURE UNLISTED      abscess    HX DILATION AND CURETTAGE       History reviewed. No pertinent family history. Social History     Social History    Marital status:      Spouse name: N/A    Number of children: N/A    Years of education: N/A     Occupational History    Not on file.      Social History Main Topics    Smoking status: Current Every Day Smoker     Packs/day: 1.00    Smokeless tobacco: Not on file    Alcohol use No    Drug use: No    Sexual activity: Not on file     Other Topics Concern    Not on file     Social History Narrative      Current Facility-Administered Medications   Medication Dose Route Frequency Provider Last Rate Last Dose    heparin (porcine) injection 5,000 Units  5,000 Units SubCUTAneous Michelle Castellon MD  lactated Ringers infusion  100 mL/hr IntraVENous CONTINUOUS Reinaldo Schmidt MD        ceFAZolin in 0.9% NS (ANCEF) IVPB soln 2 g  2 g IntraVENous ONCE Reinaldo Schmidt MD        sertraline (ZOLOFT) tablet 100 mg  100 mg Oral DAILY Onelia Lawler MD   100 mg at 05/08/18 0858    morphine injection 2 mg  2 mg IntraVENous Q4H PRN Mynor Williamson MD   2 mg at 05/08/18 0858    pantoprazole (PROTONIX) tablet 40 mg  40 mg Oral ACB Ricardo Cuellar MD   40 mg at 05/08/18 0504    sodium chloride (NS) flush 5-10 mL  5-10 mL IntraVENous Q8H Ricardo Cuellar MD   10 mL at 05/07/18 2200    sodium chloride (NS) flush 5-10 mL  5-10 mL IntraVENous PRN Ricardo Cuellar MD        acetaminophen (TYLENOL) tablet 650 mg  650 mg Oral Q4H PRN Ricardo Cuellar MD   650 mg at 05/08/18 0900    HYDROcodone-acetaminophen (NORCO) 5-325 mg per tablet 1 Tab  1 Tab Oral Q4H PRN Ricardo Cuellar MD   1 Tab at 05/08/18 0510    ondansetron (ZOFRAN) injection 4 mg  4 mg IntraVENous Q4H PRN Ricardo Cuellar MD   4 mg at 05/07/18 1841       OBJECTIVE:  Visit Vitals    /77    Pulse 65    Temp 97.6 °F (36.4 °C)    Resp 18    Ht 5' 3\" (1.6 m)    Wt 170 lb (77.1 kg)    SpO2 96%    BMI 30.11 kg/m2       Performance Status:  0    Physical Exam   Constitutional: She is oriented to person, place, and time and well-developed, well-nourished, and in no distress. HENT:   Head: Normocephalic and atraumatic. Neck: Normal range of motion. Neck supple. No tracheal deviation present. No thyromegaly present. Cardiovascular: Normal rate, regular rhythm and normal heart sounds. Pulmonary/Chest: Effort normal. No respiratory distress. Abdominal: Bowel sounds are normal.   Genitourinary: Vagina normal, cervix normal, right adnexa normal and left adnexa normal.   Genitourinary Comments: Large mass midline, mobile, consistatn with leiomyoma   Musculoskeletal: Normal range of motion. She exhibits no edema.    Neurological: She is alert and oriented to person, place, and time. Skin: Skin is warm and dry. Psychiatric: Mood, memory, affect and judgment normal.   Nursing note and vitals reviewed. Labs:  Recent Results (from the past 24 hour(s))   HCG URINE, QL    Collection Time: 05/07/18  5:00 PM   Result Value Ref Range    HCG urine, QL NEGATIVE  NEG     DRUG SCREEN, URINE    Collection Time: 05/07/18  5:00 PM   Result Value Ref Range    PCP(PHENCYCLIDINE) NEGATIVE       BENZODIAZEPINES NEGATIVE       COCAINE NEGATIVE       AMPHETAMINES NEGATIVE       METHADONE NEGATIVE       THC (TH-CANNABINOL) NEGATIVE       OPIATES POSITIVE      BARBITURATES NEGATIVE      TYPE & SCREEN    Collection Time: 05/07/18  5:40 PM   Result Value Ref Range    Crossmatch Expiration 05/10/2018     ABO/Rh(D) A POSITIVE     Antibody screen NEG    HIV-1,2 P24 AG/AB SCREEN    Collection Time: 05/07/18  5:42 PM   Result Value Ref Range    p24 Antigen NONREACTIVE NR      HIV-1,2 Ab NONREACTIVE NR     CBC WITH AUTOMATED DIFF    Collection Time: 05/08/18  5:26 AM   Result Value Ref Range    WBC 6.9 4.3 - 11.1 K/uL    RBC 3.62 (L) 4.05 - 5.25 M/uL    HGB 9.1 (L) 11.7 - 15.4 g/dL    HCT 28.8 (L) 35.8 - 46.3 %    MCV 79.6 79.6 - 97.8 FL    MCH 25.1 (L) 26.1 - 32.9 PG    MCHC 31.6 31.4 - 35.0 g/dL    RDW 14.6 11.9 - 14.6 %    PLATELET 921 (H) 285 - 450 K/uL    MPV 8.8 (L) 10.8 - 14.1 FL    DF AUTOMATED      NEUTROPHILS 59 43 - 78 %    LYMPHOCYTES 28 13 - 44 %    MONOCYTES 7 4.0 - 12.0 %    EOSINOPHILS 4 0.5 - 7.8 %    BASOPHILS 1 0.0 - 2.0 %    IMMATURE GRANULOCYTES 1 0.0 - 5.0 %    ABS. NEUTROPHILS 4.2 1.7 - 8.2 K/UL    ABS. LYMPHOCYTES 2.0 0.5 - 4.6 K/UL    ABS. MONOCYTES 0.5 0.1 - 1.3 K/UL    ABS. EOSINOPHILS 0.2 0.0 - 0.8 K/UL    ABS. BASOPHILS 0.1 0.0 - 0.2 K/UL    ABS. IMM. GRANS. 0.1 0.0 - 0.5 K/UL       Pathology:       Radiology:    Reviewed with reports.     ASSESSMENT:    ICD-10-CM ICD-9-CM    1. Leukocytosis, unspecified type D72.829 288.60      Specialty Problems     None      Leiomyoma, pain unreponsive to conservative therapy        PLAN:  Options reviewed with pt  Discussed limited availability of narcotics after surgery with prior hx, anticipate possible decreased post op pain, pt understands and agrees to proceed with that limitation. Discussed surgery, rba including not limtied anethesia, infcetion, damage internal organs, need further therpay, medical morbidity and mortality , transfusion, limitations intraop eval, unlikey malignant, conservbative options and wisehs to proceed. Possible ovary removal, if damaged or abnormal with ensuing menopasue discussed  Possible continued pain reviewed.               Rozina Gaines MD  [place] [address]  Office : [phone], Option 5  Fax : [phone]

## 2018-05-09 VITALS
SYSTOLIC BLOOD PRESSURE: 149 MMHG | HEART RATE: 83 BPM | DIASTOLIC BLOOD PRESSURE: 85 MMHG | OXYGEN SATURATION: 96 % | BODY MASS INDEX: 30.12 KG/M2 | WEIGHT: 170 LBS | HEIGHT: 63 IN | RESPIRATION RATE: 18 BRPM | TEMPERATURE: 98.3 F

## 2018-05-09 LAB
ANION GAP SERPL CALC-SCNC: 8 MMOL/L (ref 7–16)
BASOPHILS # BLD: 0 K/UL (ref 0–0.2)
BASOPHILS NFR BLD: 0 % (ref 0–2)
BUN SERPL-MCNC: 11 MG/DL (ref 6–23)
CALCIUM SERPL-MCNC: 8.3 MG/DL (ref 8.3–10.4)
CHLORIDE SERPL-SCNC: 104 MMOL/L (ref 98–107)
CO2 SERPL-SCNC: 26 MMOL/L (ref 21–32)
CREAT SERPL-MCNC: 0.49 MG/DL (ref 0.6–1)
DIFFERENTIAL METHOD BLD: ABNORMAL
EOSINOPHIL # BLD: 0 K/UL (ref 0–0.8)
EOSINOPHIL NFR BLD: 0 % (ref 0.5–7.8)
ERYTHROCYTE [DISTWIDTH] IN BLOOD BY AUTOMATED COUNT: 14.6 % (ref 11.9–14.6)
GLUCOSE SERPL-MCNC: 105 MG/DL (ref 65–100)
HCT VFR BLD AUTO: 28.9 % (ref 35.8–46.3)
HGB BLD-MCNC: 9.2 G/DL (ref 11.7–15.4)
IMM GRANULOCYTES # BLD: 0.1 K/UL (ref 0–0.5)
IMM GRANULOCYTES NFR BLD AUTO: 1 % (ref 0–5)
LYMPHOCYTES # BLD: 1.8 K/UL (ref 0.5–4.6)
LYMPHOCYTES NFR BLD: 17 % (ref 13–44)
MCH RBC QN AUTO: 25.2 PG (ref 26.1–32.9)
MCHC RBC AUTO-ENTMCNC: 31.8 G/DL (ref 31.4–35)
MCV RBC AUTO: 79.2 FL (ref 79.6–97.8)
MONOCYTES # BLD: 0.5 K/UL (ref 0.1–1.3)
MONOCYTES NFR BLD: 5 % (ref 4–12)
NEUTS SEG # BLD: 7.9 K/UL (ref 1.7–8.2)
NEUTS SEG NFR BLD: 77 % (ref 43–78)
PLATELET # BLD AUTO: 590 K/UL (ref 150–450)
PMV BLD AUTO: 8.9 FL (ref 10.8–14.1)
POTASSIUM SERPL-SCNC: 3.9 MMOL/L (ref 3.5–5.1)
RBC # BLD AUTO: 3.65 M/UL (ref 4.05–5.25)
SODIUM SERPL-SCNC: 138 MMOL/L (ref 136–145)
WBC # BLD AUTO: 10.3 K/UL (ref 4.3–11.1)

## 2018-05-09 PROCEDURE — 74011250637 HC RX REV CODE- 250/637: Performed by: OBSTETRICS & GYNECOLOGY

## 2018-05-09 PROCEDURE — 85025 COMPLETE CBC W/AUTO DIFF WBC: CPT | Performed by: OBSTETRICS & GYNECOLOGY

## 2018-05-09 PROCEDURE — 80048 BASIC METABOLIC PNL TOTAL CA: CPT | Performed by: OBSTETRICS & GYNECOLOGY

## 2018-05-09 PROCEDURE — 74011250637 HC RX REV CODE- 250/637: Performed by: INTERNAL MEDICINE

## 2018-05-09 PROCEDURE — 74011250637 HC RX REV CODE- 250/637: Performed by: FAMILY MEDICINE

## 2018-05-09 PROCEDURE — 74011250636 HC RX REV CODE- 250/636: Performed by: OBSTETRICS & GYNECOLOGY

## 2018-05-09 PROCEDURE — 36415 COLL VENOUS BLD VENIPUNCTURE: CPT | Performed by: OBSTETRICS & GYNECOLOGY

## 2018-05-09 PROCEDURE — 77030036554

## 2018-05-09 PROCEDURE — 74011000258 HC RX REV CODE- 258: Performed by: OBSTETRICS & GYNECOLOGY

## 2018-05-09 RX ORDER — AMOXICILLIN 250 MG
2 CAPSULE ORAL DAILY
Qty: 60 TAB | Refills: 1 | Status: SHIPPED | OUTPATIENT
Start: 2018-05-09 | End: 2018-05-21 | Stop reason: SDUPTHER

## 2018-05-09 RX ORDER — IBUPROFEN 800 MG/1
800 TABLET ORAL 3 TIMES DAILY
Qty: 45 TAB | Refills: 0 | Status: SHIPPED | OUTPATIENT
Start: 2018-05-09 | End: 2018-05-09

## 2018-05-09 RX ORDER — OXYCODONE HYDROCHLORIDE 10 MG/1
10 TABLET ORAL
Qty: 25 TAB | Refills: 0 | Status: SHIPPED | OUTPATIENT
Start: 2018-05-09 | End: 2018-05-09

## 2018-05-09 RX ORDER — AMOXICILLIN 250 MG
2 CAPSULE ORAL DAILY
Qty: 60 TAB | Refills: 1 | Status: SHIPPED | OUTPATIENT
Start: 2018-05-09 | End: 2018-05-09

## 2018-05-09 RX ORDER — ONDANSETRON 4 MG/1
4 TABLET, ORALLY DISINTEGRATING ORAL
Qty: 5 TAB | Refills: 2 | Status: SHIPPED | OUTPATIENT
Start: 2018-05-09 | End: 2018-05-21 | Stop reason: ALTCHOICE

## 2018-05-09 RX ORDER — OXYCODONE HYDROCHLORIDE 10 MG/1
10 TABLET ORAL
Qty: 16 TAB | Refills: 0 | Status: SHIPPED | OUTPATIENT
Start: 2018-05-09 | End: 2018-11-12

## 2018-05-09 RX ADMIN — OXYCODONE HYDROCHLORIDE 10 MG: 5 TABLET ORAL at 02:30

## 2018-05-09 RX ADMIN — PANTOPRAZOLE SODIUM 40 MG: 40 TABLET, DELAYED RELEASE ORAL at 04:38

## 2018-05-09 RX ADMIN — Medication 10 ML: at 04:40

## 2018-05-09 RX ADMIN — IBUPROFEN 800 MG: 800 TABLET ORAL at 08:53

## 2018-05-09 RX ADMIN — MORPHINE SULFATE 2 MG: 4 INJECTION, SOLUTION INTRAMUSCULAR; INTRAVENOUS at 07:03

## 2018-05-09 RX ADMIN — MORPHINE SULFATE 2 MG: 4 INJECTION, SOLUTION INTRAMUSCULAR; INTRAVENOUS at 00:46

## 2018-05-09 RX ADMIN — SERTRALINE HYDROCHLORIDE 100 MG: 100 TABLET ORAL at 08:53

## 2018-05-09 RX ADMIN — STANDARDIZED SENNA CONCENTRATE AND DOCUSATE SODIUM 2 TABLET: 8.6; 5 TABLET, FILM COATED ORAL at 08:53

## 2018-05-09 RX ADMIN — CEFAZOLIN SODIUM 1 G: 1 INJECTION, POWDER, FOR SOLUTION INTRAMUSCULAR; INTRAVENOUS at 04:37

## 2018-05-09 RX ADMIN — OXYCODONE HYDROCHLORIDE 10 MG: 5 TABLET ORAL at 12:37

## 2018-05-09 NOTE — PROGRESS NOTES
TRANSFER - IN REPORT:    Verbal report received from Wendie(name) on Zainab Gorman  being received from "[employer]") for routine post - op      Report consisted of patients Situation, Background, Assessment and   Recommendations(SBAR). Information from the following report(s) OR Summary and Procedure Summary was reviewed with the receiving nurse. Opportunity for questions and clarification was provided. Assessment completed upon patients arrival to unit and care assumed.

## 2018-05-09 NOTE — DISCHARGE INSTRUCTIONS
DISCHARGE SUMMARY from Nurse    PATIENT INSTRUCTIONS:    After general anesthesia or intravenous sedation, for 24 hours or while taking prescription Narcotics:  · Limit your activities  · Do not drive and operate hazardous machinery  · Do not make important personal or business decisions  · Do  not drink alcoholic beverages  · If you have not urinated within 8 hours after discharge, please contact your surgeon on call. Report the following to your surgeon:  · Excessive pain, swelling, redness or odor of or around the surgical area  · Temperature over 100.5  · Nausea and vomiting lasting longer than 4 hours or if unable to take medications  · Any signs of decreased circulation or nerve impairment to extremity: change in color, persistent  numbness, tingling, coldness or increase pain  · Any questions    What to do at Home:  Recommended activity: No lifting, Driving, or Strenuous exercise for 2 weeks    If you experience any of the following symptoms increased pain, fever greater than 101, nausea/vomiting, excessive bleeding, or wound drainage, please follow up with Dr. Shanna Yusuf. *  Please give a list of your current medications to your Primary Care Provider. *  Please update this list whenever your medications are discontinued, doses are      changed, or new medications (including over-the-counter products) are added. *  Please carry medication information at all times in case of emergency situations. These are general instructions for a healthy lifestyle:    No smoking/ No tobacco products/ Avoid exposure to second hand smoke  Surgeon General's Warning:  Quitting smoking now greatly reduces serious risk to your health.     Obesity, smoking, and sedentary lifestyle greatly increases your risk for illness    A healthy diet, regular physical exercise & weight monitoring are important for maintaining a healthy lifestyle    You may be retaining fluid if you have a history of heart failure or if you experience any of the following symptoms:  Weight gain of 3 pounds or more overnight or 5 pounds in a week, increased swelling in our hands or feet or shortness of breath while lying flat in bed. Please call your doctor as soon as you notice any of these symptoms; do not wait until your next office visit. Recognize signs and symptoms of STROKE:    F-face looks uneven    A-arms unable to move or move unevenly    S-speech slurred or non-existent    T-time-call 911 as soon as signs and symptoms begin-DO NOT go       Back to bed or wait to see if you get better-TIME IS BRAIN. Warning Signs of HEART ATTACK     Call 911 if you have these symptoms:   Chest discomfort. Most heart attacks involve discomfort in the center of the chest that lasts more than a few minutes, or that goes away and comes back. It can feel like uncomfortable pressure, squeezing, fullness, or pain.  Discomfort in other areas of the upper body. Symptoms can include pain or discomfort in one or both arms, the back, neck, jaw, or stomach.  Shortness of breath with or without chest discomfort.  Other signs may include breaking out in a cold sweat, nausea, or lightheadedness. Don't wait more than five minutes to call 911 - MINUTES MATTER! Fast action can save your life. Calling 911 is almost always the fastest way to get lifesaving treatment. Emergency Medical Services staff can begin treatment when they arrive -- up to an hour sooner than if someone gets to the hospital by car. The discharge information has been reviewed with the patient. The patient verbalized understanding. Discharge medications reviewed with the patient and appropriate educational materials and side effects teaching were provided.   ___________________________________________________________________________________________________________________________________    Follow-up with Dr Ruddy Epley (Gen Surgery) approx 3 weeks after discharge in the office to discuss gallbladder surgery for gallstones at:  [address], [address]  (CALL TO GET AN APPT TIME-->[phone]-->option 1)

## 2018-05-09 NOTE — PROGRESS NOTES
Vidhi Bach  Admission Date: 5/3/2018               Daily Progress Note: 5/9/2018    LAB  Recent Labs      05/09/18   0537  05/08/18   0526  05/06/18   1036   WBC  10.3  6.9  8.4   HGB  9.2*  9.1*  8.7*   HCT  28.9*  28.8*  27.4*   PLT  590*  543*  495*   NA  138   --    --    K  3.9   --    --    CL  104   --    --    CO2  26   --    --    BUN  11   --    --    CREA  0.49*   --    --    GLU  105*   --    --          Visit Vitals    /85 (BP 1 Location: Right arm, BP Patient Position: At rest)    Pulse 83    Temp 98.3 °F (36.8 °C)    Resp 18    Ht 5' 3\" (1.6 m)    Wt 170 lb (77.1 kg)    SpO2 96%    BMI 30.11 kg/m2       Current Facility-Administered Medications   Medication Dose Route Frequency Provider Last Rate Last Dose    sodium chloride (NS) flush 5-10 mL  5-10 mL IntraVENous Q8H Ellen Avitia, MD   10 mL at 05/09/18 0440    sodium chloride (NS) flush 5-10 mL  5-10 mL IntraVENous PRN Ellen End, MD        ibuprofen (MOTRIN) tablet 800 mg  800 mg Oral TID Marlysk Adore, MD        oxyCODONE IR (ROXICODONE) tablet 10 mg  10 mg Oral Q4H PRN Ellen Avitia, MD   10 mg at 05/09/18 0230    naloxone [facility]) injection 0.4 mg  0.4 mg IntraVENous PRN Ellen End, MD        ondansetron (ZOFRAN ODT) tablet 4 mg  4 mg Oral Q4H PRN Lark End, MD        diphenhydrAMINE (BENADRYL) injection 12.5 mg  12.5 mg IntraVENous Q4H PRN Marlysk End, MD        senna-docusate (PERICOLACE) 8.6-50 mg per tablet 2 Tab  2 Tab Oral DAILY Ellen Avitia MD        LORazepam (ATIVAN) tablet 1 mg  1 mg Oral Q6H PRN Marlysk End, MD        sertraline (ZOLOFT) tablet 100 mg  100 mg Oral DAILY Italia Manrique MD   100 mg at 05/08/18 0858    pantoprazole (PROTONIX) tablet 40 mg  40 mg Oral ACB Rocio Nelson MD   40 mg at 05/09/18 0438    sodium chloride (NS) flush 5-10 mL  5-10 mL IntraVENous PRN Rocio Nelson MD        acetaminophen (TYLENOL) tablet 650 mg  650 mg Oral Q4H PRN Rocio Nelson MD   650 mg at 05/08/18 0900    ondansetron (ZOFRAN) injection 4 mg  4 mg IntraVENous Q4H PRN Stan Wasserman MD   4 mg at 05/07/18 1841       No Known Allergies    NOTE  No problems except inc sore  aox3 nad  rrr  ctab  abd soft ntnd  Pos bs  Inc cdi  pod1  [place], dc iv meds, singh later or thur.     Yolanda Stanley MD  5/9/2018

## 2018-05-09 NOTE — OP NOTES
[facility] REPORT    Jerome Dumont  MR#: [id number]  : 1980  ACCOUNT #: [de-identified]   DATE OF SERVICE: 2018    PREOPERATIVE DIAGNOSES:  1.  Pelvic pain. 2.  Enlarged uterus leiomyoma. POSTOPERATIVE DIAGNOSES:   1.  Pelvic pain. 2.  Enlarged uterus leiomyoma. PROCEDURE:  Total abdominal hysterectomy and bilateral salpingectomy. SURGEON:  Ramo Strickland MD.         ANESTHESIA:  General.    ESTIMATED BLOOD LOSS:  75 mL. PACKS:  None. DRAINS:  Drain to Medina catheter. COMPLICATIONS:  None. PATHOLOGY:  Above. DISPOSITION:  PACU. INDICATIONS:  The patient was admitted to the hospitalist service from the ER with severe abdominal pain and leukocytosis. She received antibiotics and the leukocytosis resolved, but the patient continued to complain of abnormal menstruation along with pelvic pain. After reviewing the options, the risks, benefits and alternatives, morbidities, mortalities including anesthesia, bleeding, infection, transfusion, damage to other organs including gastrointestinal, genitourinary nerves or veins the patient did wish to proceed. She was already status post tubal ligation. FINDINGS:  Appendix, normal.  Bowel appeared normal.  No adenopathy. Ureters appeared normal pre and postprocedure. Ovaries appeared normal.  Tubes appeared consistent with prior tubal sterilization. Uterus was quite enlarged, most consistent with leiomyoma. PROCEDURE:  The patient was taken to the operating room after placed under general anesthesia, sterilely prepped and draped. Medina catheter placed. Vertical incision made, carried down to the fascia. Fascia was opened. Peritoneal cavity was entered. Findings were as above. An Juan retractor was placed. The uterus was grasped bilaterally. The round ligaments were cauterized and .   The uteroovarian pedicles were isolated, double clamped, incised with the LigaSure followed by a ligature proximally. The proximal tube was  from the ovary using a LigaSure. The anterior cul-de-sac was incised. Bladder flap was created sharply below the external cervical os. The uterine arteries were skeletonized bilaterally. They were grasped at the level of the internal os by clamps, incised, suture ligatures placed. This was continued to the external cervical os. Clamps were placed across the vaginal apex. Specimen was removed. The apex closed laterally with Arelis midline with figure-of-eight suture. Irrigation was performed. Hemostasis was noted at all operative sites. Jemma was placed over the denuded pelvis. Large bowel used to fill the pelvis. The remainder of the bowel to its normal location. Omentum brought to the anterior abdominal wall and small bowel. The anterior abdominal wall was closed with a modified Smead-Mcintosh closure using looped PDS. Subcutaneous tissue was irrigated, hemostasis obtained reapproximated with Vicryl followed by subcuticular Vicryl. Sponge, lap and needle counts were correct x2. The patient tolerated the procedure well and was taken to PACU stable.       MD ADAMA Thomas / ANÍBAL  D: 05/08/2018 18:02     T: 05/08/2018 18:22  JOB #: [id number]

## 2018-05-09 NOTE — PROGRESS NOTES
Per RN, Dr. Nona Herrera would like to be updated prior to discharging this afternoon, pt has not ambulated since surgery, will follow.

## 2018-05-09 NOTE — PROGRESS NOTES
Discharge instructions and prescriptions given and reviewed with pt, verbalizes understanding, pt to be discharged home, calling for ride home.

## 2018-05-09 NOTE — PROGRESS NOTES
Hourly round completed throughout the shift. Pt c/o pain throughout the night. Medicated per [place]    Bed in lower position and call light/ personal items within reach. Will continue to monitor and give bed side shift report to on coming day shift nurse.

## 2018-05-09 NOTE — DISCHARGE SUMMARY
Hospitalist Discharge Summary     Admit Date:  5/3/2018  6:55 AM   Name:  Khalida Richards   Age:  40 y.o.  :  1980   MRN:  [id number]   PCP:  None  Treatment Team: Attending Provider: Arvin Maguire MD; Utilization Review: Emelina Whitt RN; Consulting Provider: Rakesh Deluna MD; Utilization Review: Charles Goss RN; Consulting Provider: Giana Brink MD    Problem List for this Hospitalization:  Hospital Problems as of 2018  Date Reviewed: 2018          Codes Class Noted - Resolved POA    Intramural leiomyoma of uterus ICD-10-CM: D25.1  ICD-9-CM: 218.1  2018 - Present Yes        * (Principal)Abdominal pain ICD-10-CM: R10.9  ICD-9-CM: 789.00  2018 - Present Yes        UTI (urinary tract infection) ICD-10-CM: N39.0  ICD-9-CM: 599.0  2018 - Present Yes        Bacterial vaginosis ICD-10-CM: N76.0, B96.89  ICD-9-CM: 616.10, 041.9  2018 - Present Yes        Possible Endometritis ICD-10-CM: N71.9  ICD-9-CM: 615.9  2018 - Present Yes        LLQ pain ICD-10-CM: R10.32  ICD-9-CM: 789.04  2018 - Present Yes        Hematuria ICD-10-CM: R31.9  ICD-9-CM: 599.70  2018 - Present Yes        Cholelithiases ICD-10-CM: K80.20  ICD-9-CM: 574.20  5/3/2018 - Present Yes        Leukocytosis ICD-10-CM: T48.853  ICD-9-CM: 288.60  5/3/2018 - Present Yes        Pelvic pain ICD-10-CM: R10.2  ICD-9-CM: TPF1915  5/3/2018 - Present Yes        History of hepatitis C ICD-10-CM: Z86.19  ICD-9-CM: V12.09  5/3/2018 - Present Yes                Admission HPI from 5/3/2018:    \" For more details please refer to HPI \"    Hospital Course:  46yo F with PMH of Hepatitis C presents with moderate to severe abdominal pain x1 week. She reports pain is mostly in lower abdomen and right quadrants and is associated with intermittent nausea and emesis. LMP was last week and she reports menses typically heavy with clots. In the ER, she is afebrile and normotensive.  Her labs are pertinent for : [facility] 27K, HGB 9.6 and are otherwise insignificant. GYN evaluated patient who recommended MRI pelvis that was showing myometrial leiomyoma. Patient WBC returned to normal levels. Patient was started on IV abxs for suspected UTI but ucx was negative and antibiotics were stopped. GYN-ONC recommended surgery due to patient failed conservative management. Patient went for hysterectomy on 05/09, next day patient was stable and GYN-ONC stated stable for discharge. Patient was advised to follow up with PCP and GYN upon discharge. Abdominal pain likely secondary to leiomyoma of uterus. Anemia likely secondary to heavy menses, patient needs further work up on PCP office. Follow up instructions below. Plan was discussed with patient/careteam.  All questions answered. Patient was stable at time of discharge and was instructed to call or return if there are any concerns or recurrence of symptoms. Diagnostic Imaging/Tests: All Micro Results     Procedure Component Value Units Date/Time    CULTURE, URINE [[id number]] Collected:  05/03/18 2022    Order Status:  Completed Specimen:  Urine from Clean catch Updated:  05/06/18 0802     Special Requests: NO SPECIAL REQUESTS        Culture result:       <10,000  COLONIES/mL  NORMAL SKIN FAHAD ISOLATED      INFLUENZA A & B AG (RAPID TEST) [[id number]] Collected:  05/03/18 1553    Order Status:  Completed Specimen:  Nasopharyngeal from Nasal washing Updated:  05/03/18 1619     Influenza A Ag NEGATIVE          NEGATIVE FOR THE PRESENCE OF INFLUENZA A ANTIGEN  INFECTION DUE TO INFLUENZA A CANNOT BE RULED OUT. BECAUSE THE ANTIGEN PRESENT IN THE SAMPLE MAY BE BELOW  THE DETECTION LIMIT OF THE TEST. A NEGATIVE TEST IS PRESUMPTIVE AND IT IS RECOMMENDED THAT THESE RESULTS BE CONFIRMED BY VIRAL CULTURE OR AN FDA-CLEARED INFLUENZA A AND B MOLECULAR ASSAY.           Influenza B Ag NEGATIVE          NEGATIVE FOR THE PRESENCE OF INFLUENZA B ANTIGEN  INFECTION DUE TO INFLUENZA B CANNOT BE RULED OUT.  BECAUSE THE ANTIGEN PRESENT IN THE SAMPLE MAY BE BELOW  THE DETECTION LIMIT OF THE TEST. A NEGATIVE TEST IS PRESUMPTIVE AND IT IS RECOMMENDED THAT THESE RESULTS BE CONFIRMED BY VIRAL CULTURE OR AN FDA-CLEARED INFLUENZA A AND B MOLECULAR ASSAY. WET PREP [[id number]] Collected:  05/03/18 0934    Order Status:  Completed Specimen:  Vagina Updated:  05/03/18 0952     Special Requests: NO SPECIAL REQUESTS        Wet prep NO WBCS SEEN        Wet prep NO YEAST SEEN        Wet prep NO TRICHOMONAS SEEN        Wet prep --        FEW  CLUE CELLS PRESENT            Labs: Results:       BMP, Mg, Phos Recent Labs      05/09/18 0537   NA  138   K  3.9   CL  104   CO2  26   AGAP  8   BUN  11   CREA  0.49*   CA  8.3   GLU  105*      CBC Recent Labs      05/09/18 0537 05/08/18 0526 05/06/18   1036   WBC  10.3  6.9  8.4   RBC  3.65*  3.62*  3.40*   HGB  9.2*  9.1*  8.7*   HCT  28.9*  28.8*  27.4*   PLT  590*  543*  495*   GRANS  77  59  74   LYMPH  17  28  18   EOS  0*  4  2   MONOS  5  7  5   BASOS  0  1  1   IG  1  1  0   ANEU  7.9  4.2  6.2   ABL  1.8  2.0  1.5   SHIVA  0.0  0.2  0.2   ABM  0.5  0.5  0.4   ABB  0.0  0.1  0.0   AIG  0.1  0.1  0.0      LFT No results for input(s): SGOT, ALT, TBIL, AP, TP, ALB, GLOB, AGRAT, GPT in the last 72 hours.    Cardiac Testing No results found for: BNPP, BNP, CPK, RCK1, RCK2, RCK3, RCK4, CKMB, CKNDX, CKND1, TROPT, TROIQ   Coagulation Tests No results found for: PTP, INR, APTT   A1c No results found for: HBA1C, HGBE8, YSF0GGHR, MHI4EPZQ   Lipid Panel No results found for: CHOL, CHOLPOCT, CHOLX, CHLST, CHOLV, 489034, HDL, LDL, LDLC, DLDLP, 510626, VLDLC, VLDL, TGLX, TRIGL, TRIGP, TGLPOCT, CHHD, CHHDX   Thyroid Panel No results found for: T4, T3U, TSH, TSHEXT, TSHEXT     Most Recent UA Lab Results   Component Value Date/Time    Color ORANGE 05/03/2018 08:22 PM    Appearance CLOUDY 05/03/2018 08:22 PM    Specific gravity 1.029 (H) 05/03/2018 08:22 PM    pH (UA) 5.5 05/03/2018 08:22 PM    Protein 100 (A) 05/03/2018 08:22 PM    Glucose NEGATIVE  05/03/2018 08:22 PM    Ketone TRACE (A) 05/03/2018 08:22 PM    Bilirubin SMALL (A) 05/03/2018 08:22 PM    Blood LARGE (A) 05/03/2018 08:22 PM    Urobilinogen 1.0 05/03/2018 08:22 PM    Nitrites POSITIVE (A) 05/03/2018 08:22 PM    Leukocyte Esterase SMALL (A) 05/03/2018 08:22 PM        No Known Allergies    There is no immunization history on file for this patient. All Labs from Last 24 Hrs:  Recent Results (from the past 24 hour(s))   METABOLIC PANEL, BASIC    Collection Time: 05/09/18  5:37 AM   Result Value Ref Range    Sodium 138 136 - 145 mmol/L    Potassium 3.9 3.5 - 5.1 mmol/L    Chloride 104 98 - 107 mmol/L    CO2 26 21 - 32 mmol/L    Anion gap 8 7 - 16 mmol/L    Glucose 105 (H) 65 - 100 mg/dL    BUN 11 6 - 23 MG/DL    Creatinine 0.49 (L) 0.6 - 1.0 MG/DL    GFR est AA >60 >60 ml/min/1.73m2    GFR est non-AA >60 >60 ml/min/1.73m2    Calcium 8.3 8.3 - 10.4 MG/DL   CBC WITH AUTOMATED DIFF    Collection Time: 05/09/18  5:37 AM   Result Value Ref Range    WBC 10.3 4.3 - 11.1 K/uL    RBC 3.65 (L) 4.05 - 5.25 M/uL    HGB 9.2 (L) 11.7 - 15.4 g/dL    HCT 28.9 (L) 35.8 - 46.3 %    MCV 79.2 (L) 79.6 - 97.8 FL    MCH 25.2 (L) 26.1 - 32.9 PG    MCHC 31.8 31.4 - 35.0 g/dL    RDW 14.6 11.9 - 14.6 %    PLATELET 574 (H) 897 - 450 K/uL    MPV 8.9 (L) 10.8 - 14.1 FL    DF AUTOMATED      NEUTROPHILS 77 43 - 78 %    LYMPHOCYTES 17 13 - 44 %    MONOCYTES 5 4.0 - 12.0 %    EOSINOPHILS 0 (L) 0.5 - 7.8 %    BASOPHILS 0 0.0 - 2.0 %    IMMATURE GRANULOCYTES 1 0.0 - 5.0 %    ABS. NEUTROPHILS 7.9 1.7 - 8.2 K/UL    ABS. LYMPHOCYTES 1.8 0.5 - 4.6 K/UL    ABS. MONOCYTES 0.5 0.1 - 1.3 K/UL    ABS. EOSINOPHILS 0.0 0.0 - 0.8 K/UL    ABS. BASOPHILS 0.0 0.0 - 0.2 K/UL    ABS. IMM.  GRANS. 0.1 0.0 - 0.5 K/UL       Discharge Exam:  Patient Vitals for the past 24 hrs:   Temp Pulse Resp BP SpO2   05/09/18 0657 98.3 °F (36.8 °C) 83 18 149/85 96 %   05/09/18 0441 98.3 °F (36.8 °C) 88 18 151/89 94 %   05/08/18 2240 98.1 °F (36.7 °C) 86 16 136/77 92 %   05/08/18 1936 - 72 15 132/73 95 %   05/08/18 1931 - 72 16 133/71 95 %   05/08/18 1926 - 75 12 126/69 94 %   05/08/18 1921 - 76 14 124/67 95 %   05/08/18 1916 - 70 18 121/69 97 %   05/08/18 1911 - 71 17 118/65 98 %   05/08/18 1906 - 76 24 116/65 98 %   05/08/18 1901 98.2 °F (36.8 °C) 71 19 118/66 97 %   05/08/18 1856 - 71 14 119/67 100 %   05/08/18 1851 - 69 18 117/57 100 %   05/08/18 1846 - 67 18 115/58 100 %   05/08/18 1841 - 64 15 110/54 100 %   05/08/18 1836 - 63 17 111/57 100 %   05/08/18 1831 - 61 16 111/56 100 %   05/08/18 1826 - 60 16 106/57 100 %   05/08/18 1822 - 61 27 109/57 100 %   05/08/18 1817 - 63 19 113/59 100 %   05/08/18 1815 98 °F (36.7 °C) 66 16 106/58 100 %   05/08/18 1813 - 66 12 106/58 100 %   05/08/18 1509 98 °F (36.7 °C) 71 18 134/72 97 %   05/08/18 1031 97.7 °F (36.5 °C) 66 18 117/77 96 %     Oxygen Therapy  O2 Sat (%): 96 % (05/09/18 0657)  Pulse via Oximetry: 72 beats per minute (05/08/18 1936)  O2 Device: Room air (05/08/18 1901)  O2 Flow Rate (L/min): 1 l/min (05/08/18 1856)    Intake/Output Summary (Last 24 hours) at 05/09/18 1020  Last data filed at 05/09/18 0833   Gross per 24 hour   Intake             1685 ml   Output             2520 ml   Net             -835 ml       General:    Well nourished. Alert. No distress. Eyes:   Normal sclera. Extraocular movements intact. ENT:  Normocephalic, atraumatic. Moist mucous membranes  CV:   Regular rate and rhythm. Lungs:  Clear to auscultation bilaterally. No wheezing, rhonchi, or rales. Abdomen: Minimal tenderness around surgical area. (+) BS, soft. Clean wound with no erythema or drainage. Extremities: Warm and dry. No cyanosis or edema. Neurologic: CN II-XII grossly intact. Sensation intact.       Discharge Info:   Current Discharge Medication List      START taking these medications    Details   ondansetron (ZOFRAN ODT) 4 mg disintegrating tablet Take 1 Tab by mouth every four (4) hours as needed. Qty: 5 Tab, Refills: 2      oxyCODONE IR (ROXICODONE) 10 mg tab immediate release tablet Take 1 Tab by mouth every four (4) hours as needed. Max Daily Amount: 60 mg.  Qty: 16 Tab, Refills: 0    Associated Diagnoses: Generalized abdominal pain      senna-docusate (PERICOLACE) 8.6-50 mg per tablet Take 2 Tabs by mouth daily. Qty: 60 Tab, Refills: 1         CONTINUE these medications which have NOT CHANGED    Details   sertraline (ZOLOFT) 100 mg tablet Take 100 mg by mouth daily. traZODone (DESYREL) 150 mg tablet Take 150 mg by mouth nightly. Disposition: home  Activity: Activity as tolerated  Diet: Regular Diet    Follow-up Information     Follow up With Details Comments Contact Info    None   None ([phone]) Patient stated that they have no PCP              Signed:   Luis Felipe Garrison MD

## 2018-05-09 NOTE — PROGRESS NOTES
Care Management Interventions  PCP Verified by CM:  (referral made to physicians service to establish PCP)  Mode of Transport at Discharge: Other (see comment) (family car)  Transition of Care Consult (CM Consult): Other (home wiith family to assist)  Discharge Durable Medical Equipment: No  Physical Therapy Consult: No  Occupational Therapy Consult: No  Current Support Network: Own Home, Lives with Spouse (pt has supportive family)  Confirm Follow Up Transport: Family  Plan discussed with Pt/Family/Caregiver: Yes  Freedom of Choice Offered: Yes  Discharge Location  Discharge Placement: Home with family assistance    Pt to discharge to home today. Pt states her cousin will be with her during the day while her bf works. No HH needs indicated. Spoke to [facility]; application has been submitted to Medicaid, SSI/SSDI. Pt interested in PCP. Referral made to [facility] with Physician Services requesting that PCP be established and appointment scheduled. Discussed pt's medications. She states she will be able to obtain. No other needs.

## 2018-05-12 NOTE — DISCHARGE SUMMARY
[address] Leanna Spikes  MR#: [id number]  : 1980  ACCOUNT #: [de-identified]   ADMIT DATE: 2018  DISCHARGE DATE: 2018    DISCHARGE DIAGNOSES:    1. Leiomyoma. 2.  Pelvic pain secondary to above. HOSPITAL COURSE:  Patient was admitted through the ER with abdominal pain. Subsequently, she was noted to have a bubble on the imaging, she was evaluated by general surgery and not felt to be a source of her pain. She was evaluated by GYN initially and then GYN asked for input and treatment through GYN oncology. I reviewed the risks, benefits, alternatives, morbidities, and mortalities for the above including but not limited to anesthesia, bleeding, infection, transfusion, damage to surrounding organs, and benign likely and the patient wished to proceed. Persistent pain issues and pain medication postop was also reviewed. The patient did well postoperatively and was subsequently discharged in good condition after instructions, precautions, and intraoperative findings were reviewed. DISPOSITION:  Home. MEDICATIONS:  EMR. Script checked. FOLLOWUP:  Follow up one week.       MD ADAMA Gonzales/JERRICA  D: 2018 12:49     T: 2018 00:12  JOB #: [id number]

## 2018-05-17 PROBLEM — N80.00 UTERUS, ADENOMYOSIS: Status: ACTIVE | Noted: 2018-05-17

## 2018-05-21 PROBLEM — G47.00 INSOMNIA: Status: ACTIVE | Noted: 2018-05-21

## 2018-05-21 PROBLEM — F41.9 ANXIETY: Status: ACTIVE | Noted: 2018-05-21

## 2018-05-21 PROBLEM — N76.0 BACTERIAL VAGINOSIS: Status: RESOLVED | Noted: 2018-05-04 | Resolved: 2018-05-21

## 2018-05-21 PROBLEM — D72.829 LEUKOCYTOSIS: Status: RESOLVED | Noted: 2018-05-03 | Resolved: 2018-05-21

## 2018-05-21 PROBLEM — N39.0 UTI (URINARY TRACT INFECTION): Status: RESOLVED | Noted: 2018-05-04 | Resolved: 2018-05-21

## 2018-05-21 PROBLEM — B96.89 BACTERIAL VAGINOSIS: Status: RESOLVED | Noted: 2018-05-04 | Resolved: 2018-05-21

## 2018-10-29 ENCOUNTER — HOSPITAL ENCOUNTER (EMERGENCY)
Age: 38
Discharge: HOME | End: 2018-10-29
Attending: EMERGENCY MEDICINE
Payer: SUBSIDIZED

## 2018-10-29 VITALS
TEMPERATURE: 98.2 F | HEART RATE: 90 BPM | HEIGHT: 63 IN | BODY MASS INDEX: 26.4 KG/M2 | RESPIRATION RATE: 18 BRPM | DIASTOLIC BLOOD PRESSURE: 82 MMHG | OXYGEN SATURATION: 98 % | WEIGHT: 149 LBS | SYSTOLIC BLOOD PRESSURE: 144 MMHG

## 2018-10-29 DIAGNOSIS — L02.01 FACIAL ABSCESS: Primary | ICD-10-CM

## 2018-10-29 PROCEDURE — 74011250637 HC RX REV CODE- 250/637: Performed by: EMERGENCY MEDICINE

## 2018-10-29 PROCEDURE — 99283 EMERGENCY DEPT VISIT LOW MDM: CPT | Performed by: EMERGENCY MEDICINE

## 2018-10-29 PROCEDURE — 75810000289 HC I&D ABSCESS SIMP/COMP/MULT: Performed by: EMERGENCY MEDICINE

## 2018-10-29 PROCEDURE — 74011250636 HC RX REV CODE- 250/636: Performed by: EMERGENCY MEDICINE

## 2018-10-29 RX ORDER — IBUPROFEN 600 MG/1
TABLET ORAL
Status: DISCONTINUED
Start: 2018-10-29 | End: 2018-10-29 | Stop reason: HOSPADM

## 2018-10-29 RX ORDER — CHLORHEXIDINE GLUCONATE 4 G/100ML
SOLUTION TOPICAL
Qty: 236 ML | Refills: 0 | Status: SHIPPED | OUTPATIENT
Start: 2018-10-29 | End: 2018-11-12

## 2018-10-29 RX ORDER — SULFAMETHOXAZOLE AND TRIMETHOPRIM 800; 160 MG/1; MG/1
1 TABLET ORAL 2 TIMES DAILY
Qty: 20 TAB | Refills: 0 | Status: SHIPPED | OUTPATIENT
Start: 2018-10-29 | End: 2018-11-08

## 2018-10-29 RX ORDER — LIDOCAINE HYDROCHLORIDE 10 MG/ML
INJECTION, SOLUTION EPIDURAL; INFILTRATION; INTRACAUDAL; PERINEURAL
Status: DISCONTINUED
Start: 2018-10-29 | End: 2018-10-29 | Stop reason: HOSPADM

## 2018-10-29 RX ORDER — LIDOCAINE HYDROCHLORIDE 10 MG/ML
10 INJECTION, SOLUTION EPIDURAL; INFILTRATION; INTRACAUDAL; PERINEURAL
Status: COMPLETED | OUTPATIENT
Start: 2018-10-29 | End: 2018-10-29

## 2018-10-29 RX ORDER — IBUPROFEN 600 MG/1
600 TABLET ORAL
Status: COMPLETED | OUTPATIENT
Start: 2018-10-29 | End: 2018-10-29

## 2018-10-29 RX ORDER — SULFAMETHOXAZOLE AND TRIMETHOPRIM 800; 160 MG/1; MG/1
TABLET ORAL
Status: DISCONTINUED
Start: 2018-10-29 | End: 2018-10-29 | Stop reason: HOSPADM

## 2018-10-29 RX ORDER — MUPIROCIN 20 MG/G
OINTMENT TOPICAL
Qty: 22 G | Refills: 0 | Status: SHIPPED | OUTPATIENT
Start: 2018-10-29 | End: 2018-11-12

## 2018-10-29 RX ORDER — SULFAMETHOXAZOLE AND TRIMETHOPRIM 800; 160 MG/1; MG/1
1 TABLET ORAL
Status: COMPLETED | OUTPATIENT
Start: 2018-10-29 | End: 2018-10-29

## 2018-10-29 RX ADMIN — LIDOCAINE HYDROCHLORIDE 10 ML: 10 INJECTION, SOLUTION EPIDURAL; INFILTRATION; INTRACAUDAL; PERINEURAL at 01:10

## 2018-10-29 RX ADMIN — SULFAMETHOXAZOLE AND TRIMETHOPRIM 1 TABLET: 800; 160 TABLET ORAL at 01:22

## 2018-10-29 RX ADMIN — IBUPROFEN 600 MG: 600 TABLET ORAL at 01:22

## 2018-10-29 NOTE — ED NOTES
I have reviewed discharge instructions with the patient. The patient verbalized understanding. Patient left ED via Discharge Method: ambulatory to Home with friend. Opportunity for questions and clarification provided. Patient given 1 scripts. To continue your aftercare when you leave the hospital, you may receive an automated call from our care team to check in on how you are doing. This is a free service and part of our promise to provide the best care and service to meet your aftercare needs.  If you have questions, or wish to unsubscribe from this service please call [phone]. Thank you for Choosing our [facility] Emergency Department.

## 2018-10-29 NOTE — ED PROVIDER NOTES
The history is provided by the patient. Skin Problem This is a new problem. The current episode started more than 2 days ago. The problem has been gradually worsening. The problem is associated with nothing. There has been no fever. The rash is present on the face. The pain is moderate. The pain has been constant since onset. Associated symptoms include pain. Treatments tried: amoxicillin. The treatment provided no relief. Past Medical History:  
Diagnosis Date  Abnormal Pap smear of cervix  Drug abuse Imprisoned in [place] d/t cocaine possession (allegedly)  Gall stones  Hepatitis C Past Surgical History:  
Procedure Laterality Date  BREAST SURGERY PROCEDURE UNLISTED    
 abscess  HX DILATION AND CURETTAGE    
 HX HYSTERECTOMY  05/08/2018  
 adenomyosis No family history on file. Social History Socioeconomic History  Marital status: LEGALLY  Spouse name: Not on file  Number of children: Not on file  Years of education: Not on file  Highest education level: Not on file Social Needs  Financial resource strain: Not on file  Food insecurity - worry: Not on file  Food insecurity - inability: Not on file  Transportation needs - medical: Not on file  Transportation needs - non-medical: Not on file Occupational History  Not on file Tobacco Use  Smoking status: Current Every Day Smoker Packs/day: 1.00  Smokeless tobacco: Never Used Substance and Sexual Activity  Alcohol use: No  
 Drug use: No  
 Sexual activity: No  
Other Topics Concern  Not on file Social History Narrative  Not on file ALLERGIES: Patient has no known allergies. Review of Systems Constitutional: Negative for fever. Eyes: Negative for pain and visual disturbance. Vitals:  
 10/29/18 0927 BP: 144/82 Pulse: 90 Resp: 18 Temp: 98.2 °F (36.8 °C) SpO2: 98% Weight: 67.6 kg (149 lb) Height: 5' 3\" (1.6 m) Physical Exam  
Constitutional: She appears well-developed and well-nourished. HENT:  
Head:  
 
 
Eyes: EOM are normal. Pupils are equal, round, and reactive to light. MDM Number of Diagnoses or Management Options Diagnosis management comments: Left facial abscess in need of incision and drainage. Needs appropriate antibiotics. I&D Abcess Complex Date/Time: 10/29/2018 1:18 AM 
Performed by: Marquetta Dandy, MD 
Authorized by: Marquetta Dandy, MD  
 
Consent:  
  Consent obtained:  Verbal 
  Consent given by:  Patient Risks discussed:  Bleeding and pain Alternatives discussed:  No treatment Location:  
  Type:  Abscess Size:  2cm Location: left temple. Pre-procedure details:  
  Skin preparation:  Chloraprep Anesthesia (see MAR for exact dosages): Anesthesia method:  Local infiltration Local anesthetic:  Lidocaine 1% w/o epi Procedure type:  
  Complexity:  Complex Procedure details:  
  Needle aspiration: no Incision types:  Single straight Incision depth:  Subcutaneous Scalpel blade:  11 Wound management:  Probed and deloculated Drainage:  Bloody and purulent Drainage amount:  Scant Wound treatment:  Wound left open Packing materials:  None Post-procedure details:  
  Patient tolerance of procedure: Tolerated well, no immediate complications

## 2018-10-29 NOTE — DISCHARGE INSTRUCTIONS
Medications as prescribed. Tylenol 500-650 mg every 4 hours for pain. Ibuprofen 600 mg every 6 hours for pain. Clean the wound twice daily after promoting drainage with gentle pressure. Clean with antibacterial soap and water, dry, apply Bactroban ointment and cover with a Band-Aid or bandage. Always use a  clean Q-tip when applying the Bactroban to the inside of your nose. Never put a contaminated or dirty Q-tip that is touched or wound to the tip of the Bactroban ointment so as to not contaminate it. Hibiclens soap as prescribed. The Bactroban ointment to the nose and so far to help eradicate her body of the causative bacteria. Follow-up with your doctor in 3 days if not improving for a wound recheck. Return if any new, worsening or concerning symptoms.

## 2018-10-29 NOTE — ED TRIAGE NOTES
Pt states she had a pimple on her left outer eye. States the past 2 days it has gotten bigger and started to swell and feels warm. States she feels like she has had a fever but did not check her temperature. States there is some clear drainage. States she is taking an antibiotic that her friend gave her. States she thinks it is amoxacillin

## 2018-11-03 ENCOUNTER — HOSPITAL ENCOUNTER (EMERGENCY)
Age: 38
Discharge: HOME | End: 2018-11-03
Payer: SUBSIDIZED

## 2018-11-03 VITALS
DIASTOLIC BLOOD PRESSURE: 79 MMHG | HEIGHT: 63 IN | OXYGEN SATURATION: 98 % | RESPIRATION RATE: 18 BRPM | HEART RATE: 88 BPM | BODY MASS INDEX: 27.11 KG/M2 | TEMPERATURE: 97.8 F | SYSTOLIC BLOOD PRESSURE: 140 MMHG | WEIGHT: 153 LBS

## 2018-11-03 DIAGNOSIS — L03.211 CELLULITIS, FACE: Primary | ICD-10-CM

## 2018-11-03 PROCEDURE — 74011250637 HC RX REV CODE- 250/637

## 2018-11-03 PROCEDURE — 99283 EMERGENCY DEPT VISIT LOW MDM: CPT

## 2018-11-03 PROCEDURE — 74011000250 HC RX REV CODE- 250

## 2018-11-03 PROCEDURE — 96372 THER/PROPH/DIAG INJ SC/IM: CPT

## 2018-11-03 PROCEDURE — 74011250636 HC RX REV CODE- 250/636

## 2018-11-03 RX ORDER — CLINDAMYCIN HYDROCHLORIDE 300 MG/1
300 CAPSULE ORAL 4 TIMES DAILY
Qty: 28 CAP | Refills: 0 | Status: SHIPPED | OUTPATIENT
Start: 2018-11-03 | End: 2018-11-10

## 2018-11-03 RX ORDER — HYDROCODONE BITARTRATE AND ACETAMINOPHEN 7.5; 325 MG/1; MG/1
1 TABLET ORAL
Status: COMPLETED | OUTPATIENT
Start: 2018-11-03 | End: 2018-11-03

## 2018-11-03 RX ADMIN — HYDROCODONE BITARTRATE AND ACETAMINOPHEN 1 TABLET: 7.5; 325 TABLET ORAL at 03:53

## 2018-11-03 RX ADMIN — WATER 1 G: 1 INJECTION INTRAMUSCULAR; INTRAVENOUS; SUBCUTANEOUS at 05:29

## 2018-11-03 NOTE — ED TRIAGE NOTES
Pt state that she had an abscess cut on her face and now she is having more swelling and redness below the site. Sates that she is taking medication for the infection

## 2018-11-03 NOTE — ED PROVIDER NOTES
77-year-old female with redness swelling to the left side of her face. Patient was in the ED several days ago has small abscess drained  Lateral to the left eye. She is on Bactrim she's been taking it regularly. She noticed increased swelling today with indurated skin down her cheek. She was seen at urgent care and they sent her to the ED for further evaluation. Patient states she does not want anymore incisions made of her face if possible. Past Medical History:  
Diagnosis Date  Abnormal Pap smear of cervix  Drug abuse Imprisoned in [place] d/t cocaine possession (allegedly)  Gall stones  Hepatitis C Past Surgical History:  
Procedure Laterality Date  BREAST SURGERY PROCEDURE UNLISTED    
 abscess  HX DILATION AND CURETTAGE    
 HX HYSTERECTOMY  05/08/2018  
 adenomyosis No family history on file. Social History Socioeconomic History  Marital status: LEGALLY  Spouse name: Not on file  Number of children: Not on file  Years of education: Not on file  Highest education level: Not on file Social Needs  Financial resource strain: Not on file  Food insecurity - worry: Not on file  Food insecurity - inability: Not on file  Transportation needs - medical: Not on file  Transportation needs - non-medical: Not on file Occupational History  Not on file Tobacco Use  Smoking status: Current Every Day Smoker Packs/day: 1.00  Smokeless tobacco: Never Used Substance and Sexual Activity  Alcohol use: No  
 Drug use: No  
 Sexual activity: No  
Other Topics Concern  Not on file Social History Narrative  Not on file ALLERGIES: Patient has no known allergies. Review of Systems Constitutional: Negative. Negative for activity change. HENT: Negative. Eyes: Negative. Respiratory: Negative. Cardiovascular: Negative. Gastrointestinal: Negative. Genitourinary: Negative. Musculoskeletal: Negative. Skin: Negative. Neurological: Negative. Psychiatric/Behavioral: Negative. All other systems reviewed and are negative. Vitals:  
 11/03/18 0236 BP: 142/82 Pulse: 98 Resp: 18 Temp: 97.7 °F (36.5 °C) SpO2: 99% Weight: 69.4 kg (153 lb) Height: 5' 3\" (1.6 m) Physical Exam  
Constitutional: She is oriented to person, place, and time. She appears well-developed and well-nourished. No distress. HENT:  
Head: Normocephalic. Head is with abrasion. Right Ear: External ear normal.  
Left Ear: External ear normal.  
Nose: Nose normal.  
Eyes: Conjunctivae and EOM are normal. Pupils are equal, round, and reactive to light. Right eye exhibits no discharge. Left eye exhibits no discharge. No scleral icterus. Neck: Normal range of motion. Cardiovascular: Regular rhythm. Pulmonary/Chest: Effort normal and breath sounds normal. No stridor. No respiratory distress. She has no wheezes. She has no rales. Abdominal: Soft. Bowel sounds are normal. She exhibits no distension. There is no tenderness. Musculoskeletal: Normal range of motion. Neurological: She is alert and oriented to person, place, and time. She exhibits normal muscle tone. Coordination normal.  
Skin: Skin is warm and dry. No rash noted. Psychiatric: She has a normal mood and affect. Her behavior is normal.  
  
 
MDM Number of Diagnoses or Management Options Diagnosis management comments: We'll try the antibiotics and change to clindamycin. Amount and/or Complexity of Data Reviewed Discussion of test results with the performing providers: yes Procedures

## 2018-11-03 NOTE — ED NOTES
RN attempted IV start on pt for antibiotics. Attempt unsuccessful. RN alerted charge nurse. Medic to attempt IV placement

## 2018-11-03 NOTE — ED NOTES
Dr Maribel Stroud made aware that pt is a hard stick and that multiple staff have tried multiple attempts at getting an IV started, with no success. Dr Maribel Stroud to modify orders accordingly

## 2018-11-03 NOTE — ED NOTES
I have reviewed discharge instructions with the patient. The patient verbalized understanding. Patient left ED via Discharge Method: ambulatory to Home with . Opportunity for questions and clarification provided. Patient given 1 scripts. To continue your aftercare when you leave the hospital, you may receive an automated call from our care team to check in on how you are doing. This is a free service and part of our promise to provide the best care and service to meet your aftercare needs.  If you have questions, or wish to unsubscribe from this service please call [phone]. Thank you for Choosing our [facility] Emergency Department.

## 2018-11-03 NOTE — DISCHARGE INSTRUCTIONS

## 2018-11-03 NOTE — LETTER
[address] [facility] EMERGENCY DEPT One [address] [id number] [phone] Work/School Note Date: 11/3/2018 To Whom It May concern: 
 
Zehra Remy was seen and treated today in the emergency room by the following provider(s): 
Attending Provider: Vinnie Gaming MD. Zehra Remy may return to work on 11/3/2018.  
 
Sincerely, 
 
 
 
 
Terra Flanagan RN

## 2018-11-12 PROBLEM — D25.1 INTRAMURAL LEIOMYOMA OF UTERUS: Status: RESOLVED | Noted: 2018-05-06 | Resolved: 2018-11-12

## 2019-03-08 ENCOUNTER — HOSPITAL ENCOUNTER (EMERGENCY)
Age: 39
Discharge: HOME | End: 2019-03-08
Attending: EMERGENCY MEDICINE
Payer: SELF-PAY

## 2019-03-08 VITALS
TEMPERATURE: 98.4 F | OXYGEN SATURATION: 100 % | HEART RATE: 64 BPM | HEIGHT: 63 IN | SYSTOLIC BLOOD PRESSURE: 120 MMHG | WEIGHT: 168 LBS | RESPIRATION RATE: 16 BRPM | DIASTOLIC BLOOD PRESSURE: 63 MMHG | BODY MASS INDEX: 29.77 KG/M2

## 2019-03-08 DIAGNOSIS — M62.838 CERVICAL PARASPINAL MUSCLE SPASM: Primary | ICD-10-CM

## 2019-03-08 PROCEDURE — 74011250637 HC RX REV CODE- 250/637: Performed by: EMERGENCY MEDICINE

## 2019-03-08 PROCEDURE — 99284 EMERGENCY DEPT VISIT MOD MDM: CPT | Performed by: EMERGENCY MEDICINE

## 2019-03-08 RX ORDER — IBUPROFEN 800 MG/1
800 TABLET ORAL
Qty: 21 TAB | Refills: 0 | Status: SHIPPED | OUTPATIENT
Start: 2019-03-08 | End: 2019-04-05 | Stop reason: ALTCHOICE

## 2019-03-08 RX ORDER — METHOCARBAMOL 750 MG/1
750 TABLET, FILM COATED ORAL
Status: COMPLETED | OUTPATIENT
Start: 2019-03-08 | End: 2019-03-08

## 2019-03-08 RX ORDER — METHOCARBAMOL 750 MG/1
750 TABLET, FILM COATED ORAL
Qty: 20 TAB | Refills: 0 | Status: SHIPPED | OUTPATIENT
Start: 2019-03-08 | End: 2019-04-05 | Stop reason: ALTCHOICE

## 2019-03-08 RX ADMIN — METHOCARBAMOL 750 MG: 750 TABLET ORAL at 22:32

## 2019-03-09 NOTE — DISCHARGE INSTRUCTIONS
Patient Education        Neck Spasm: Care Instructions  Your Care Instructions  A neck spasm is sudden tightness and pain in your neck muscles. A spasm may be caused by some activities or repeated movements. For example, you may be more likely to have a neck spasm if you slouch, paint a ceiling, work at a computer, or sleep with your neck twisted. But the cause isn't always clear. Home treatment includes using heat or ice, taking over-the-counter (OTC) pain medicines, and avoiding activities that may lead to neck pain. Gentle stretching, or treatments such as massage or manipulation, may also help ease a neck spasm. For a neck spasm that doesn't get better with home care, your doctor may prescribe medicine. He or she may also suggest exercise or physical therapy to help strengthen or relax your neck muscles. Follow-up care is a key part of your treatment and safety. Be sure to make and go to all appointments, and call your doctor if you are having problems. It's also a good idea to know your test results and keep a list of the medicines you take. How can you care for yourself at home? · To relieve pain, use heat or ice (whichever feels better) on the affected area. ? Put a warm water bottle, a heating pad set on low, or a warm cloth on your neck. Put a thin cloth between the heating pad and your skin. Do not go to sleep with a heating pad on your skin. ? Try ice or a cold pack on the area for 10 to 20 minutes at a time. Put a thin cloth between the ice and your skin. · Ask your doctor if you can take acetaminophen (such as Tylenol) or nonsteroidal anti-inflammatory drugs, such as ibuprofen or naproxen. Your doctor can prescribe stronger medicines if needed. Be safe with medicines. Read and follow all instructions on the label. · Stretch your muscles every day, especially before and after exercise and at bedtime. Regular stretching can help relax your muscles.   · Try to find a pillow and a position in bed that help improve your night's rest.  · Try to stay active. It's best to start activity slowly. If an exercise makes your pain worse, stop doing it. When should you call for help? Call 911 anytime you think you may need emergency care. For example, call if:    · You are unable to move an arm or a leg at all.   [facility] your doctor now or seek immediate medical care if:    · You have new or worse symptoms in your arms, legs, belly, or buttocks. Symptoms may include:  ? Numbness or tingling. ? Weakness. ? Pain.     · You lose bladder or bowel control.    Watch closely for changes in your health, and be sure to contact your doctor if:    · You do not get better as expected. Where can you learn more? Go to http://candice-audra.info/. Enter X840 in the search box to learn more about \"Neck Spasm: Care Instructions. \"  Current as of: September 20, 2018  Content Version: 11.9  © 8597-4994 [employer]. Care instructions adapted under license by [employer] (which disclaims liability or warranty for this information). If you have questions about a medical condition or this instruction, always ask your healthcare professional. [place] [address] any warranty or liability for your use of this information. Patient Education        Neck Spasm: Exercises  Your Care Instructions  Here are some examples of typical rehabilitation exercises for your condition. Start each exercise slowly. Ease off the exercise if you start to have pain. Your doctor or physical therapist will tell you when you can start these exercises and which ones will work best for you. How to do the exercises  Levator scapula stretch    1. Sit in a firm chair, or stand up straight. 2. Gently tilt your head toward your left shoulder. 3. Turn your head to look down into your armpit, bending your head slightly forward. Let the weight of your head stretch your neck muscles.   4. Hold for 15 to 30 seconds. 5. Return to your starting position. 6. Follow the same instructions above, but tilt your head toward your right shoulder. 7. Repeat 2 to 4 times toward each shoulder. Upper trapezius stretch    1. Sit in a firm chair, or stand up straight. 2. This stretch works best if you keep your shoulder down as you lean away from it. To help you remember to do this, start by relaxing your shoulders and lightly holding on to your thighs or your chair. 3. Tilt your head toward your shoulder and hold for 15 to 30 seconds. Let the weight of your head stretch your muscles. 4. If you would like a little added stretch, place your arm behind your back. Use the arm opposite of the direction you are tilting your head. For example, if you are tilting your head to the left, place your right arm behind your back. 5. Repeat 2 to 4 times toward each shoulder. Neck rotation    1. Sit in a firm chair, or stand up straight. 2. Keeping your chin level, turn your head to the right, and hold for 15 to 30 seconds. 3. Turn your head to the left, and hold for 15 to 30 seconds. 4. Repeat 2 to 4 times to each side. Chin tuck    1. Lie on the floor with a rolled-up towel under your neck. Your head should be touching the floor. 2. Slowly bring your chin toward the front of your neck. 3. Hold for a count of 6, and then relax for up to 10 seconds. 4. Repeat 8 to 12 times. Forward neck flexion    1. Sit in a firm chair, or stand up straight. 2. Bend your head forward. 3. Hold for 15 to 30 seconds, then return to your starting position. 4. Repeat 2 to 4 times. Follow-up care is a key part of your treatment and safety. Be sure to make and go to all appointments, and call your doctor if you are having problems. It's also a good idea to know your test results and keep a list of the medicines you take. Where can you learn more? Go to http://candice-audra.info/.   Enter P962 in the search box to learn more about \"Neck Spasm: Exercises. \"  Current as of: September 20, 2018  Content Version: 11.9  © 1928-6733 [employer]. Care instructions adapted under license by [employer] (which disclaims liability or warranty for this information). If you have questions about a medical condition or this instruction, always ask your healthcare professional. [address] any warranty or liability for your use of this information.

## 2019-03-09 NOTE — ED NOTES
I have reviewed discharge instructions with the patient. The patient verbalized understanding. Patient left ED via Discharge Method: ambulatory to Home with self. Opportunity for questions and clarification provided. Patient given 2 scripts. To continue your aftercare when you leave the hospital, you may receive an automated call from our care team to check in on how you are doing. This is a free service and part of our promise to provide the best care and service to meet your aftercare needs.  If you have questions, or wish to unsubscribe from this service please call [phone]. Thank you for Choosing our [facility] Emergency Department.
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
## 2019-03-23 ENCOUNTER — HOSPITAL ENCOUNTER (EMERGENCY)
Age: 39
Discharge: HOME | End: 2019-03-23
Attending: EMERGENCY MEDICINE
Payer: SELF-PAY

## 2019-03-23 VITALS
BODY MASS INDEX: 29.23 KG/M2 | DIASTOLIC BLOOD PRESSURE: 76 MMHG | SYSTOLIC BLOOD PRESSURE: 122 MMHG | HEART RATE: 70 BPM | TEMPERATURE: 97.5 F | RESPIRATION RATE: 18 BRPM | OXYGEN SATURATION: 99 % | HEIGHT: 63 IN | WEIGHT: 165 LBS

## 2019-03-23 DIAGNOSIS — J06.9 ACUTE URI: Primary | ICD-10-CM

## 2019-03-23 LAB
FLUAV AG NPH QL IA: NEGATIVE
FLUBV AG NPH QL IA: NEGATIVE
SPECIMEN SOURCE: NORMAL

## 2019-03-23 PROCEDURE — 87804 INFLUENZA ASSAY W/OPTIC: CPT

## 2019-03-23 PROCEDURE — 99282 EMERGENCY DEPT VISIT SF MDM: CPT | Performed by: EMERGENCY MEDICINE

## 2019-03-23 RX ORDER — AMOXICILLIN AND CLAVULANATE POTASSIUM 500; 125 MG/1; MG/1
1 TABLET, FILM COATED ORAL 2 TIMES DAILY
Qty: 14 TAB | Refills: 0 | Status: SHIPPED | OUTPATIENT
Start: 2019-03-23 | End: 2019-03-30

## 2019-03-23 RX ORDER — PREDNISONE 20 MG/1
TABLET ORAL
Qty: 11 TAB | Refills: 0 | Status: SHIPPED | OUTPATIENT
Start: 2019-03-23 | End: 2019-04-05 | Stop reason: ALTCHOICE

## 2019-03-23 NOTE — LETTER
[address] [facility] EMERGENCY DEPT One [address] [id number] 
[phone] Work/School Note Date: 3/23/2019 To Whom It May concern: 
 
Lorrie Knight was seen and treated today in the emergency room by the following provider(s): 
Attending Provider: Jamee Vo MD 
Nurse Practitioner: Kory Bernal NP. Lorrie Knight may return to work on Tuesday. Sincerely, Valentin Cabrera NP

## 2019-03-23 NOTE — ED NOTES
I have reviewed discharge instructions with the patient. The patient verbalized understanding. Patient left ED via Discharge Method: ambulatory to Home with self. Opportunity for questions and clarification provided. Patient given 3 scripts. Patient given work excuse. To continue your aftercare when you leave the hospital, you may receive an automated call from our care team to check in on how you are doing. This is a free service and part of our promise to provide the best care and service to meet your aftercare needs.  If you have questions, or wish to unsubscribe from this service please call [phone]. Thank you for Choosing our [facility] Emergency Department.

## 2019-03-23 NOTE — ED TRIAGE NOTES
Presents today with c/o of sore throat, body aches, and productive cough. States fevers at home, currently 97.5 in triage. Alert and oriented.

## 2019-03-23 NOTE — DISCHARGE INSTRUCTIONS
Patient Education        Saline Nasal Washes: Care Instructions  Your Care Instructions  Saline nasal washes help keep the nasal passages open by washing out thick or dried mucus. This simple remedy can help relieve symptoms of allergies, sinusitis, and colds. It also can make the nose feel more comfortable by keeping the mucous membranes moist. You may notice a little burning sensation in your nose the first few times you use the solution, but this usually gets better in a few days. Follow-up care is a key part of your treatment and safety. Be sure to make and go to all appointments, and call your doctor if you are having problems. It's also a good idea to know your test results and keep a list of the medicines you take. How can you care for yourself at home? · You can buy premixed saline solution in a squeeze bottle or other sinus rinse products at a drugstore. Read and follow the instructions on the label. · You also can make your own saline solution by adding 1 teaspoon of salt and 1 teaspoon of baking soda to 2 cups of distilled water. · If you use a homemade solution, pour a small amount into a clean bowl. Using a rubber bulb syringe, squeeze the syringe and place the tip in the salt water. Pull a small amount of the salt water into the syringe by relaxing your hand. · Sit down with your head tilted slightly back. Do not lie down. Put the tip of the bulb syringe or the squeeze bottle a little way into one of your nostrils. Gently drip or squirt a few drops into the nostril. Repeat with the other nostril. Some sneezing and gagging are normal at first.  · Gently blow your nose. · Wipe the syringe or bottle tip clean after each use. · Repeat this 2 or 3 times a day. · Use nasal washes gently if you have nosebleeds often. When should you call for help?   Watch closely for changes in your health, and be sure to contact your doctor if:    · You often get nosebleeds.     · You have problems doing the nasal washes. Where can you learn more? Go to http://candice-audra.info/. Enter 071 [id number] in the search box to learn more about \"Saline Nasal Washes: Care Instructions. \"  Current as of: March 27, 2018  Content Version: 11.9  © 0125-4527 [employer]. Care instructions adapted under license by [employer] (which disclaims liability or warranty for this information). If you have questions about a medical condition or this instruction, always ask your healthcare professional. [place] [address] any warranty or liability for your use of this information. Patient Education        Upper Respiratory Infection (Cold): Care Instructions  Your Care Instructions    An upper respiratory infection, or URI, is an infection of the nose, sinuses, or throat. URIs are spread by coughs, sneezes, and direct contact. The common cold is the most frequent kind of URI. The flu and sinus infections are other kinds of URIs. Almost all URIs are caused by viruses. Antibiotics won't cure them. But you can treat most infections with home care. This may include drinking lots of fluids and taking over-the-counter pain medicine. You will probably feel better in 4 to 10 days. The doctor has checked you carefully, but problems can develop later. If you notice any problems or new symptoms, get medical treatment right away. Follow-up care is a key part of your treatment and safety. Be sure to make and go to all appointments, and call your doctor if you are having problems. It's also a good idea to know your test results and keep a list of the medicines you take. How can you care for yourself at home? · To prevent dehydration, drink plenty of fluids, enough so that your urine is light yellow or clear like water. Choose water and other caffeine-free clear liquids until you feel better.  If you have kidney, heart, or liver disease and have to limit fluids, talk with your doctor before you increase the amount of fluids you drink. · Take an over-the-counter pain medicine, such as acetaminophen (Tylenol), ibuprofen (Advil, Motrin), or naproxen (Aleve). Read and follow all instructions on the label. · Before you use cough and cold medicines, check the label. These medicines may not be safe for young children or for people with certain health problems. · Be careful when taking over-the-counter cold or flu medicines and Tylenol at the same time. Many of these medicines have acetaminophen, which is Tylenol. Read the labels to make sure that you are not taking more than the recommended dose. Too much acetaminophen (Tylenol) can be harmful. · Get plenty of rest.  · Do not smoke or allow others to smoke around you. If you need help quitting, talk to your doctor about stop-smoking programs and medicines. These can increase your chances of quitting for good. When should you call for help? Call 911 anytime you think you may need emergency care. For example, call if:    · You have severe trouble breathing.    Call your doctor now or seek immediate medical care if:    · You seem to be getting much sicker.     · You have new or worse trouble breathing.     · You have a new or higher fever.     · You have a new rash.    Watch closely for changes in your health, and be sure to contact your doctor if:    · You have a new symptom, such as a sore throat, an earache, or sinus pain.     · You cough more deeply or more often, especially if you notice more mucus or a change in the color of your mucus.     · You do not get better as expected. Where can you learn more? Go to http://candice-audra.info/. Enter Z305 in the search box to learn more about \"Upper Respiratory Infection (Cold): Care Instructions. \"  Current as of: September 5, 2018  Content Version: 11.9  © 6507-6526 [employer].  Care instructions adapted under license by [employer] (which disclaims liability or warranty for this information). If you have questions about a medical condition or this instruction, always ask your healthcare professional. [place] [address] any warranty or liability for your use of this information.        home with family    Take meds as directed  Follow with family md for continued care   Work note

## 2019-03-23 NOTE — ED PROVIDER NOTES
27-year-old female who is 8 months status post hysterectomy presents with 3 day history of cough, congestion, sore throat, sinus pain, and yellowish sinus drainage. Fever at home as well with chills. No nausea vomiting or diarrhea. She also has a cough productive of white sputum. She has been wheezing which is unusual for her. She smokes proximal half pack a day. Past Medical History:  
Diagnosis Date  Abnormal Pap smear of cervix  [facility] Imprisoned in [place] d/t cocaine possession (allegedly)  Gall stones  Hepatitis C Past Surgical History:  
Procedure Laterality Date  BREAST SURGERY PROCEDURE UNLISTED    
 abscess  HX DILATION AND CURETTAGE    
 HX HYSTERECTOMY  05/08/2018  
 adenomyosis No family history on file. Social History Socioeconomic History  Marital status: LEGALLY  Spouse name: Not on file  Number of children: Not on file  Years of education: Not on file  Highest education level: Not on file Occupational History  Not on file Social Needs  Financial resource strain: Not on file  Food insecurity:  
  Worry: Not on file Inability: Not on file  Transportation needs:  
  Medical: Not on file Non-medical: Not on file Tobacco Use  Smoking status: Current Every Day Smoker Packs/day: 1.00  Smokeless tobacco: Never Used Substance and Sexual Activity  Alcohol use: No  
 Drug use: No  
 Sexual activity: Never Lifestyle  Physical activity:  
  Days per week: Not on file Minutes per session: Not on file  Stress: Not on file Relationships  Social connections:  
  Talks on phone: Not on file Gets together: Not on file Attends Restorationist service: Not on file Active member of club or organization: Not on file Attends meetings of clubs or organizations: Not on file Relationship status: Not on file  Intimate partner violence: Fear of current or ex partner: Not on file Emotionally abused: Not on file Physically abused: Not on file Forced sexual activity: Not on file Other Topics Concern  Not on file Social History Narrative  Not on file ALLERGIES: Patient has no known allergies. Review of Systems Constitutional: Positive for chills and fever. HENT: Positive for congestion, ear pain, postnasal drip, rhinorrhea, sinus pressure, sinus pain and sore throat. Eyes: Negative for discharge and redness. Respiratory: Positive for cough and wheezing. Negative for shortness of breath and stridor. Cardiovascular: Negative for chest pain and palpitations. Gastrointestinal: Negative for nausea and vomiting. Genitourinary: Negative for difficulty urinating and menstrual problem. Musculoskeletal: Negative for back pain and myalgias. Skin: Negative for color change and wound. Neurological: Negative for facial asymmetry and weakness. Psychiatric/Behavioral: Negative for confusion and decreased concentration. Vitals:  
 03/23/19 1234 BP: 122/76 Pulse: 70 Resp: 18 Temp: 97.5 °F (36.4 °C) SpO2: 99% Weight: 74.8 kg (165 lb) Height: 5' 3\" (1.6 m) Physical Exam  
Constitutional: She is oriented to person, place, and time. She appears well-developed and well-nourished. HENT:  
Head: Normocephalic and atraumatic. Right Ear: Hearing, tympanic membrane, external ear and ear canal normal.  
Left Ear: Hearing, tympanic membrane, external ear and ear canal normal.  
Nose: Mucosal edema and rhinorrhea present. Right sinus exhibits maxillary sinus tenderness and frontal sinus tenderness. Left sinus exhibits maxillary sinus tenderness and frontal sinus tenderness. Mouth/Throat: Uvula is midline and mucous membranes are normal. Posterior oropharyngeal edema and posterior oropharyngeal erythema present. No oropharyngeal exudate. Eyes: Pupils are equal, round, and reactive to light. EOM are normal.  
Neck: Normal range of motion. Neck supple. Cardiovascular: Normal rate and regular rhythm. Pulmonary/Chest: Effort normal. She has wheezes. She has rales. Abdominal: Soft. Musculoskeletal: Normal range of motion. She exhibits no edema. Lymphadenopathy:  
  She has cervical adenopathy. Neurological: She is alert and oriented to person, place, and time. Skin: Skin is warm and dry. Capillary refill takes less than 2 seconds. She is not diaphoretic. Psychiatric: She has a normal mood and affect. Her behavior is normal. Judgment and thought content normal.  
Nursing note and vitals reviewed. MDM Number of Diagnoses or Management Options Diagnosis management comments: 77-year-old female who is 8 months status post hysterectomy presents with 3 day history of cough, congestion, sore throat, sinus pain, and yellowish sinus drainage. Fever at home as well with chills. No nausea vomiting or diarrhea. She also has a cough productive of white sputum. She has been wheezing which is unusual for her. She smokes proximal half pack a day. Wheezing noted on exam with rhonchi on the right that does not clear with cough. Patient refused chest x-ray she refused inhaled bronchodilator. Said she just wanted some prescriptions and to go home and sleep. She has used inhalers in the past.  And she is comfortable using these at home. Will discharge home with upper respiratory infection to follow with her family physician. Risk of Complications, Morbidity, and/or Mortality Presenting problems: minimal 
Diagnostic procedures: minimal 
Management options: minimal 
 
Patient Progress Patient progress: stable Procedures

## 2019-07-14 ENCOUNTER — HOSPITAL ENCOUNTER (EMERGENCY)
Age: 39
Discharge: HOME | End: 2019-07-14
Attending: EMERGENCY MEDICINE
Payer: SELF-PAY

## 2019-07-14 ENCOUNTER — APPOINTMENT (OUTPATIENT)
Dept: GENERAL RADIOLOGY | Age: 39
End: 2019-07-14
Attending: EMERGENCY MEDICINE
Payer: SELF-PAY

## 2019-07-14 VITALS
SYSTOLIC BLOOD PRESSURE: 165 MMHG | DIASTOLIC BLOOD PRESSURE: 71 MMHG | WEIGHT: 160 LBS | HEIGHT: 63 IN | TEMPERATURE: 97.6 F | BODY MASS INDEX: 28.35 KG/M2 | RESPIRATION RATE: 18 BRPM | HEART RATE: 91 BPM | OXYGEN SATURATION: 100 %

## 2019-07-14 DIAGNOSIS — S61.209A AVULSION OF FINGER, INITIAL ENCOUNTER: Primary | ICD-10-CM

## 2019-07-14 PROCEDURE — 74011250636 HC RX REV CODE- 250/636: Performed by: EMERGENCY MEDICINE

## 2019-07-14 PROCEDURE — 96374 THER/PROPH/DIAG INJ IV PUSH: CPT | Performed by: NURSE PRACTITIONER

## 2019-07-14 PROCEDURE — 74011250636 HC RX REV CODE- 250/636: Performed by: NURSE PRACTITIONER

## 2019-07-14 PROCEDURE — 75810000293 HC SIMP/SUPERF WND  RPR: Performed by: NURSE PRACTITIONER

## 2019-07-14 PROCEDURE — 77030002888 HC SUT CHRMC J&J -A: Performed by: NURSE PRACTITIONER

## 2019-07-14 PROCEDURE — 99284 EMERGENCY DEPT VISIT MOD MDM: CPT | Performed by: NURSE PRACTITIONER

## 2019-07-14 PROCEDURE — 96375 TX/PRO/DX INJ NEW DRUG ADDON: CPT | Performed by: NURSE PRACTITIONER

## 2019-07-14 PROCEDURE — 73130 X-RAY EXAM OF HAND: CPT

## 2019-07-14 RX ORDER — LIDOCAINE HYDROCHLORIDE 10 MG/ML
10 INJECTION INFILTRATION; PERINEURAL
Status: COMPLETED | OUTPATIENT
Start: 2019-07-14 | End: 2019-07-14

## 2019-07-14 RX ORDER — ONDANSETRON 2 MG/ML
4 INJECTION INTRAMUSCULAR; INTRAVENOUS
Status: COMPLETED | OUTPATIENT
Start: 2019-07-14 | End: 2019-07-14

## 2019-07-14 RX ORDER — MORPHINE SULFATE 10 MG/ML
4 INJECTION, SOLUTION INTRAMUSCULAR; INTRAVENOUS
Status: COMPLETED | OUTPATIENT
Start: 2019-07-14 | End: 2019-07-14

## 2019-07-14 RX ORDER — CEPHALEXIN 500 MG/1
500 CAPSULE ORAL 4 TIMES DAILY
Qty: 28 CAP | Refills: 0 | Status: SHIPPED | OUTPATIENT
Start: 2019-07-14 | End: 2019-07-21

## 2019-07-14 RX ORDER — ACETAMINOPHEN 325 MG/1
650 TABLET ORAL
Qty: 20 TAB | Refills: 0 | Status: SHIPPED | OUTPATIENT
Start: 2019-07-14 | End: 2020-04-14

## 2019-07-14 RX ADMIN — MORPHINE SULFATE 4 MG: 10 INJECTION, SOLUTION INTRAMUSCULAR; INTRAVENOUS at 17:09

## 2019-07-14 RX ADMIN — LIDOCAINE HYDROCHLORIDE 10 ML: 10 INJECTION, SOLUTION INFILTRATION; PERINEURAL at 17:31

## 2019-07-14 RX ADMIN — ONDANSETRON 4 MG: 2 INJECTION INTRAMUSCULAR; INTRAVENOUS at 17:39

## 2019-07-14 NOTE — DISCHARGE INSTRUCTIONS
Change your dressing daily. Call orthopedics tomorrow to schedule a follow up appointment. Keflex as prescribed. Tylenol as prescribed for pain. Return to the emergency department as needed.

## 2019-07-14 NOTE — ED NOTES
I have reviewed discharge instructions with the patient. The patient verbalized understanding. Patient left ED via Discharge Method: ambulatory to Home with . Opportunity for questions and clarification provided. Patient given 2 scripts. To continue your aftercare when you leave the hospital, you may receive an automated call from our care team to check in on how you are doing. This is a free service and part of our promise to provide the best care and service to meet your aftercare needs.  If you have questions, or wish to unsubscribe from this service please call [phone]. Thank you for Choosing our [facility] Emergency Department.

## 2019-07-14 NOTE — ED NOTES
Pt left ED with IV in place. [facility] called.  Pt told NP she was headed to [facility] when she was D/c mothers address is [address]

## 2019-07-14 NOTE — LETTER
[facility] EMERGENCY DEPT [address] [address] 
[phone] Work/School Note Date: 7/14/2019 To Whom It May concern: 
 
Irene Bishop was seen and treated today in the emergency room by the following provider(s): 
Attending Provider: Lauren Carreon MD 
Nurse Practitioner: Lynne Halsted, APRN. Irene Bishop needs to be excused from work until she is evaluated by orthopedics.   
 
Sincerely, 
 
 
 
 
MENG Gonzalez

## 2019-07-14 NOTE — ED NOTES
Pt fingers wrapped in a wet to dry dressing. Pt instructed on how to care for dressing and how to apply dressings at home. Pt demonstrates understanding.

## 2019-07-14 NOTE — ED PROVIDER NOTES
Patient presents with avulsion to the tips of her 2nd and 3rd finger on her right hand. She states incident happened today. She states she cut her fingers on  blades. 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 Bleeding is controlled at this time. Hand is noted to be dirty. The history is provided by the patient. Laceration    The incident occurred less than 1 hour ago. The laceration is located on the right hand. The injury mechanism is a metal edge. Foreign body present: no. The pain is at a severity of 10/10. The pain is moderate. The pain has been constant since onset. The patient's last tetanus shot was less than 5 years ago.        Past Medical History:   Diagnosis Date    Abnormal Pap smear of cervix     Depression     [facility] ([facility]     Imprisoned in [place] d/t cocaine possession (allegedly)    Gall stones     Hepatitis C        Past Surgical History:   Procedure Laterality Date    BREAST SURGERY PROCEDURE UNLISTED      abscess    HX DILATION AND CURETTAGE      HX HYSTERECTOMY  05/08/2018    adenomyosis         Family History:   Problem Relation Age of Onset    Psychiatric Disorder Mother        Social History     Socioeconomic History    Marital status: SINGLE     Spouse name: Not on file    Number of children: Not on file    Years of education: Not on file    Highest education level: Not on file   Occupational History    Not on file   Social Needs    Financial resource strain: Not on file    Food insecurity:     Worry: Not on file     Inability: Not on file    Transportation needs:     Medical: Not on file     Non-medical: Not on file   Tobacco Use    Smoking status: Current Every Day Smoker     Packs/day: 1.00    Smokeless tobacco: Never Used   Substance and Sexual Activity    Alcohol use: No    Drug use: No    Sexual activity: Never   Lifestyle    Physical activity:     Days per week: Not on file     Minutes per session: Not on file    Stress: Not on file   Relationships    Social connections:     Talks on phone: Not on file     Gets together: Not on file     Attends Yazidi service: Not on file     Active member of club or organization: Not on file     Attends meetings of clubs or organizations: Not on file     Relationship status: Not on file    Intimate partner violence:     Fear of current or ex partner: Not on file     Emotionally abused: Not on file     Physically abused: Not on file     Forced sexual activity: Not on file   Other Topics Concern    Not on file   Social History Narrative    Not on file         ALLERGIES: Patient has no known allergies. Review of Systems   Musculoskeletal: Positive for arthralgias. Skin: Positive for wound. Vitals:    07/14/19 1623   BP: 176/76   Pulse: (!) 105   Resp: 18   Temp: 98.4 °F (36.9 °C)   SpO2: 99%   Weight: 72.6 kg (160 lb)   Height: 5' 3\" (1.6 m)            Physical Exam   Constitutional: She is oriented to person, place, and time. She appears distressed. HENT:   Head: Normocephalic and atraumatic. Eyes: Conjunctivae and EOM are normal.   Neck: Normal range of motion. Neck supple. Cardiovascular: Normal rate and regular rhythm. Pulmonary/Chest: Effort normal and breath sounds normal.   Musculoskeletal:        Right hand: She exhibits tenderness and laceration. Hands:  Neurological: She is alert and oriented to person, place, and time. Skin: Skin is warm and dry. She is not diaphoretic. Psychiatric: She has a normal mood and affect. Her behavior is normal.   Nursing note and vitals reviewed. Xr Hand Rt Min 3 V    Result Date: 7/14/2019  Right hand Clinical location: Right hand lacerated by mower blade FINDINGS: Three views of the right hand show a soft tissue laceration over the distal tip of the third finger. No fracture appreciated. The joint spaces are maintained.      IMPRESSION: Soft tissue laceration over the distal aspect of the third finger without underlying fracture. Patient's information left for social work for follow and to ensure patient gets orthopedic follow up. MDM  Number of Diagnoses or Management Options  Avulsion of finger, initial encounter:   Diagnosis management comments: Xray negative for acute fracture. Wound repaired and dressing applied. Patient referred to orthopedics for follow up. Prescription for keflex and tylenol given at discharge. Amount and/or Complexity of Data Reviewed  Tests in the radiology section of CPT®: ordered and reviewed  Tests in the medicine section of CPT®: ordered and reviewed    Patient Progress  Patient progress: stable         Wound Repair  Date/Time: 7/14/2019 6:32 PM  Performed by: THEAupervgerson provider: wilder   Preparation: skin prepped with Betadine  Pre-procedure re-eval: Immediately prior to the procedure, the patient was reevaluated and found suitable for the planned procedure and any planned medications. Time out: Immediately prior to the procedure a time out was called to verify the correct patient, procedure, equipment, staff and marking as appropriate. .  Location details: right long finger and right ring finger  Wound length:2.6 - 7.5 cm  Anesthesia: digital block    Anesthesia:  Local Anesthetic: lidocaine 1% without epinephrine  Anesthetic total: 6 mL  Foreign bodies: no foreign bodies  Irrigation solution: saline  Irrigation method: syringe  Debridement: none  Fascia closure: gut  Number of sutures: 3  Technique: simple  Approximation: close  Dressing: antibiotic ointment (non stick)  Patient tolerance: Patient tolerated the procedure well with no immediate complications  My total time at bedside, performing this procedure was 1-15 minutes.

## 2019-09-05 ENCOUNTER — HOSPITAL ENCOUNTER (EMERGENCY)
Age: 39
Discharge: HOME | End: 2019-09-05
Attending: EMERGENCY MEDICINE
Payer: SELF-PAY

## 2019-09-05 ENCOUNTER — APPOINTMENT (OUTPATIENT)
Dept: GENERAL RADIOLOGY | Age: 39
End: 2019-09-05
Attending: EMERGENCY MEDICINE
Payer: SELF-PAY

## 2019-09-05 VITALS
RESPIRATION RATE: 18 BRPM | HEART RATE: 76 BPM | TEMPERATURE: 98.2 F | DIASTOLIC BLOOD PRESSURE: 70 MMHG | HEIGHT: 63 IN | BODY MASS INDEX: 28.35 KG/M2 | WEIGHT: 160 LBS | OXYGEN SATURATION: 97 % | SYSTOLIC BLOOD PRESSURE: 109 MMHG
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
## 2019-09-05 PROCEDURE — 74011250637 HC RX REV CODE- 250/637: Performed by: NURSE PRACTITIONER

## 2019-09-05 PROCEDURE — 99284 EMERGENCY DEPT VISIT MOD MDM: CPT | Performed by: NURSE PRACTITIONER

## 2019-09-05 PROCEDURE — 74011250636 HC RX REV CODE- 250/636: Performed by: EMERGENCY MEDICINE

## 2019-09-05 PROCEDURE — 90471 IMMUNIZATION ADMIN: CPT | Performed by: NURSE PRACTITIONER

## 2019-09-05 PROCEDURE — 73590 X-RAY EXAM OF LOWER LEG: CPT

## 2019-09-05 PROCEDURE — 90715 TDAP VACCINE 7 YRS/> IM: CPT | Performed by: EMERGENCY MEDICINE

## 2019-09-05 PROCEDURE — 73562 X-RAY EXAM OF KNEE 3: CPT

## 2019-09-05 RX ORDER — HYDROCODONE BITARTRATE AND ACETAMINOPHEN 5; 325 MG/1; MG/1
1 TABLET ORAL
Qty: 18 TAB | Refills: 0 | Status: SHIPPED | OUTPATIENT
Start: 2019-09-05 | End: 2019-09-08

## 2019-09-05 RX ORDER — AMOXICILLIN AND CLAVULANATE POTASSIUM 875; 125 MG/1; MG/1
1 TABLET, FILM COATED ORAL 2 TIMES DAILY
Qty: 20 TAB | Refills: 0 | Status: SHIPPED | OUTPATIENT
Start: 2019-09-05 | End: 2020-04-14

## 2019-09-05 RX ORDER — HYDROCODONE BITARTRATE AND ACETAMINOPHEN 5; 325 MG/1; MG/1
1 TABLET ORAL
Status: COMPLETED | OUTPATIENT
Start: 2019-09-05 | End: 2019-09-05

## 2019-09-05 RX ADMIN — TETANUS TOXOID, REDUCED DIPHTHERIA TOXOID AND ACELLULAR PERTUSSIS VACCINE, ADSORBED 0.5 ML: 5; 2.5; 8; 8; 2.5 SUSPENSION INTRAMUSCULAR at 16:41

## 2019-09-05 RX ADMIN — HYDROCODONE BITARTRATE AND ACETAMINOPHEN 1 TABLET: 5; 325 TABLET ORAL at 16:41

## 2019-09-05 NOTE — ED PROVIDER NOTES
Patient is a 44 yo female with multiple dog bit puncture wounds just prior to arrival. States she was walking by a house and a white dog ran out and bit her multiple times. She does not know the dog or the owner and unsure whos it was but states human society and police went out to capture it. Unknown when last tetanus was. NO further injuries or complaints. Patient seen by me briefly in triage to begin workup until further evaluation and management by another provider once a room becomes available. This provider agrees with provider in triage's note.             Past Medical History:   Diagnosis Date    Abnormal Pap smear of cervix     Depression     Drug abuse ([facility] [address]     Imprisoned in [place] d/t cocaine possession (allegedly)    Gall stones     Hepatitis C        Past Surgical History:   Procedure Laterality Date    BREAST SURGERY PROCEDURE UNLISTED      abscess    HX DILATION AND CURETTAGE      HX HYSTERECTOMY  05/08/2018    adenomyosis         Family History:   Problem Relation Age of Onset    Psychiatric Disorder Mother        Social History     Socioeconomic History    Marital status: SINGLE     Spouse name: Not on file    Number of children: Not on file    Years of education: Not on file    Highest education level: Not on file   Occupational History    Not on file   Social Needs    Financial resource strain: Not on file    Food insecurity:     Worry: Not on file     Inability: Not on file    Transportation needs:     Medical: Not on file     Non-medical: Not on file   Tobacco Use    Smoking status: Current Every Day Smoker     Packs/day: 1.00    Smokeless tobacco: Never Used   Substance and Sexual Activity    Alcohol use: No    Drug use: No    Sexual activity: Never   Lifestyle    Physical activity:     Days per week: Not on file     Minutes per session: Not on file    Stress: Not on file   Relationships    Social connections:     Talks on phone: Not on file     Gets together: Not on file     Attends Temple service: Not on file     Active member of club or organization: Not on file     Attends meetings of clubs or organizations: Not on file     Relationship status: Not on file    Intimate partner violence:     Fear of current or ex partner: Not on file     Emotionally abused: Not on file     Physically abused: Not on file     Forced sexual activity: Not on file   Other Topics Concern    Not on file   Social History Narrative    Not on file         ALLERGIES: Patient has no known allergies. Review of Systems   Constitutional: Negative for fever. Respiratory: Negative for cough. Gastrointestinal: Negative for abdominal pain. Musculoskeletal: Positive for arthralgias. Skin: Positive for wound. Vitals:    09/05/19 1526   BP: 109/70   Pulse: 76   Resp: 18   Temp: 98.2 °F (36.8 °C)   SpO2: 97%   Weight: 72.6 kg (160 lb)   Height: 5' 3\" (1.6 m)            Physical Exam   Constitutional: She is oriented to person, place, and time. She appears well-developed and well-nourished. No distress. HENT:   Head: Normocephalic and atraumatic. Eyes: Conjunctivae and EOM are normal.   Neck: Normal range of motion. Neck supple. Cardiovascular: Normal rate and regular rhythm. Pulmonary/Chest: Effort normal and breath sounds normal.   Abdominal: Soft. She exhibits no distension. There is no tenderness. Neurological: She is alert and oriented to person, place, and time. Skin: Skin is warm and dry. Laceration noted. She is not diaphoretic. No pallor. Multiple puncture wound to bilateral legs. Psychiatric: She has a normal mood and affect. Her behavior is normal.   Nursing note and vitals reviewed. Xr Tib/fib Lt    Result Date: 9/5/2019  History: Bilateral lower extremity pain in right knee pain after dog bite RIGHT TIB-FIB AP AND LATERAL VIEWS: There is no displaced fracture or malalignment. There is no radiopaque foreign body.  RIGHT KNEE SERIES: There is no displaced fracture, malalignment, advanced degenerative change or radiopaque foreign body. LEFT TIB-FIB AP AND LATERAL VIEWS: There is no displaced fracture, malalignment or radiopaque foreign body. There is minimal edema in the soft tissues of the medial distal left lower extremity. IMPRESSION: No acute osseous findings. Xr Tib/fib Rt    Result Date: 9/5/2019  History: Bilateral lower extremity pain in right knee pain after dog bite RIGHT TIB-FIB AP AND LATERAL VIEWS: There is no displaced fracture or malalignment. There is no radiopaque foreign body. RIGHT KNEE SERIES: There is no displaced fracture, malalignment, advanced degenerative change or radiopaque foreign body. LEFT TIB-FIB AP AND LATERAL VIEWS: There is no displaced fracture, malalignment or radiopaque foreign body. There is minimal edema in the soft tissues of the medial distal left lower extremity. IMPRESSION: No acute osseous findings. Xr Knee Rt 3 V    Result Date: 9/5/2019  History: Bilateral lower extremity pain in right knee pain after dog bite RIGHT TIB-FIB AP AND LATERAL VIEWS: There is no displaced fracture or malalignment. There is no radiopaque foreign body. RIGHT KNEE SERIES: There is no displaced fracture, malalignment, advanced degenerative change or radiopaque foreign body. LEFT TIB-FIB AP AND LATERAL VIEWS: There is no displaced fracture, malalignment or radiopaque foreign body. There is minimal edema in the soft tissues of the medial distal left lower extremity. IMPRESSION: No acute osseous findings. MDM  Number of Diagnoses or Management Options  Dog bite, initial encounter:   Diagnosis management comments: Puncture wounds cleaned and dressing applied. Tetanus given prior to discharge. Xray negative for acute abnormalities.         Amount and/or Complexity of Data Reviewed  Clinical lab tests: ordered and reviewed  Tests in the medicine section of CPT®: ordered    Risk of Complications, Morbidity, and/or Mortality  Presenting problems: low  Diagnostic procedures: low  Management options: low    Patient Progress  Patient progress: stable    ED Course as of Sep 05 1649   Thu Sep 05, 2019   1611 Patient declined rabies injections at this time.      [JM]      ED Course User Index  [JM] MENG Olivera       Procedures

## 2019-09-05 NOTE — ED NOTES
I have reviewed discharge instructions with the patient. The patient verbalized understanding. Patient left ED via Discharge Method: ambulatory to Home with self. Opportunity for questions and clarification provided. Patient given 2 scripts. To continue your aftercare when you leave the hospital, you may receive an automated call from our care team to check in on how you are doing. This is a free service and part of our promise to provide the best care and service to meet your aftercare needs.  If you have questions, or wish to unsubscribe from this service please call [phone]. Thank you for Choosing our [facility] Emergency Department.

## 2019-09-05 NOTE — ED NOTES
Patient to ED with c/c dog bites. Patient states she was bit by a dog unknown to her. Patient cannot state if dog had a collar or appeared to be a stray. Patient with multiple bites to lower extremities. No major bleeding. No obvious major penetrations noted. Patient with stable VS at time of triage.

## 2019-09-05 NOTE — DISCHARGE INSTRUCTIONS
Keep areas clean dry and covered. Medications as prescribed. Follow up with animal control with any additional concerns you may have regarding the dog. Return to the emergency department as needed.

## 2020-01-09 ENCOUNTER — HOSPITAL ENCOUNTER (EMERGENCY)
Age: 40
Discharge: HOME | End: 2020-01-09
Attending: EMERGENCY MEDICINE
Payer: SELF-PAY

## 2020-01-09 VITALS
BODY MASS INDEX: 31.01 KG/M2 | HEIGHT: 63 IN | RESPIRATION RATE: 16 BRPM | TEMPERATURE: 98.7 F | WEIGHT: 175 LBS | HEART RATE: 73 BPM | SYSTOLIC BLOOD PRESSURE: 144 MMHG | OXYGEN SATURATION: 98 % | DIASTOLIC BLOOD PRESSURE: 80 MMHG

## 2020-01-09 DIAGNOSIS — L08.9 WOUND INFECTION: Primary | ICD-10-CM

## 2020-01-09 DIAGNOSIS — T14.8XXA WOUND INFECTION: Primary | ICD-10-CM

## 2020-01-09 PROCEDURE — 74011000250 HC RX REV CODE- 250: Performed by: EMERGENCY MEDICINE

## 2020-01-09 PROCEDURE — 96372 THER/PROPH/DIAG INJ SC/IM: CPT | Performed by: EMERGENCY MEDICINE

## 2020-01-09 PROCEDURE — 74011250636 HC RX REV CODE- 250/636: Performed by: EMERGENCY MEDICINE

## 2020-01-09 PROCEDURE — 99282 EMERGENCY DEPT VISIT SF MDM: CPT | Performed by: EMERGENCY MEDICINE

## 2020-01-09 RX ORDER — CEPHALEXIN 500 MG/1
500 CAPSULE ORAL 3 TIMES DAILY
Qty: 21 CAP | Refills: 0 | Status: SHIPPED | OUTPATIENT
Start: 2020-01-09 | End: 2020-01-16

## 2020-01-09 RX ADMIN — LIDOCAINE HYDROCHLORIDE 1 G: 10 INJECTION, SOLUTION INFILTRATION; PERINEURAL at 20:47

## 2020-01-10 NOTE — ED TRIAGE NOTES
Patient arrives to ED from home. Patient has a cut to her right lower arm. This happened x 3 weeks ago. Patient states that she has been putting peroxide on area every day. Patient states the area is warm around cut and is swollen underneath. Redness noted around laceration. Denies fever. State that she has had chills on and off.

## 2020-01-10 NOTE — ED NOTES
I have reviewed discharge instructions with the patient. The patient verbalized understanding. Patient left ED via Discharge Method: ambulatory to Home with male friend. Opportunity for questions and clarification provided. Patient given 1 scripts. To continue your aftercare when you leave the hospital, you may receive an automated call from our care team to check in on how you are doing. This is a free service and part of our promise to provide the best care and service to meet your aftercare needs.  If you have questions, or wish to unsubscribe from this service please call [phone]. Thank you for Choosing our [facility] Emergency Department.

## 2020-01-10 NOTE — ED PROVIDER NOTES
54-year-old white female presents with a 3month-old laceration on her right forearm. He states that a month ago she punched a garage window and suffered a laceration. She was seen at [facility] at that time. She was evaluated for psychiatric issues and she says the laceration was not addressed. She reports she has kept it clean and treated it daily with peroxide but has continued to swell and has increasing redness. No fever or vomiting. The history is provided by the patient.    Laceration           Past Medical History:   Diagnosis Date    Abnormal Pap smear of cervix     Depression     Drug abuse ([facility] [address]     Imprisoned in [place] d/t cocaine possession (allegedly)    Gall stones     Hepatitis C        Past Surgical History:   Procedure Laterality Date    BREAST SURGERY PROCEDURE UNLISTED      abscess    HX DILATION AND CURETTAGE      HX HYSTERECTOMY  05/08/2018    adenomyosis         Family History:   Problem Relation Age of Onset    Psychiatric Disorder Mother        Social History     Socioeconomic History    Marital status: SINGLE     Spouse name: Not on file    Number of children: Not on file    Years of education: Not on file    Highest education level: Not on file   Occupational History    Not on file   Social Needs    Financial resource strain: Not on file    Food insecurity:     Worry: Not on file     Inability: Not on file    Transportation needs:     Medical: Not on file     Non-medical: Not on file   Tobacco Use    Smoking status: Current Every Day Smoker     Packs/day: 1.00    Smokeless tobacco: Never Used   Substance and Sexual Activity    Alcohol use: No    Drug use: No    Sexual activity: Never   Lifestyle    Physical activity:     Days per week: Not on file     Minutes per session: Not on file    Stress: Not on file   Relationships    Social connections:     Talks on phone: Not on file     Gets together: Not on file     Attends Oriental orthodox service: Not on file     Active member of club or organization: Not on file     Attends meetings of clubs or organizations: Not on file     Relationship status: Not on file    Intimate partner violence:     Fear of current or ex partner: Not on file     Emotionally abused: Not on file     Physically abused: Not on file     Forced sexual activity: Not on file   Other Topics Concern    Not on file   Social History Narrative    Not on file         ALLERGIES: Patient has no known allergies. Review of Systems   Constitutional: Negative for fever. Respiratory: Negative for shortness of breath. Neurological: Negative for headaches. Vitals:    01/09/20 1941   BP: 144/80   Pulse: 73   Resp: 16   Temp: 98.7 °F (37.1 °C)   SpO2: 98%   Weight: 79.4 kg (175 lb)   Height: 5' 3\" (1.6 m)            Physical Exam  Vitals signs and nursing note reviewed. Constitutional:       General: She is not in acute distress. Appearance: Normal appearance. She is not toxic-appearing. HENT:      Head: Normocephalic and atraumatic. Mouth/Throat:      Mouth: Mucous membranes are moist.      Pharynx: Oropharynx is clear. Neck:      Musculoskeletal: Normal range of motion. Cardiovascular:      Rate and Rhythm: Normal rate and regular rhythm. Pulmonary:      Effort: Pulmonary effort is normal.   Musculoskeletal:      Comments: Volar aspect of right forearm has a superficial laceration that is scabbed over and has surrounding erythema extending up the ulnar aspect of the forearm. No palpable abscess. Skin:     General: Skin is warm and dry. Neurological:      Mental Status: She is alert and oriented to person, place, and time. Psychiatric:         Mood and Affect: Mood normal.         Behavior: Behavior normal.          MDM  Number of Diagnoses or Management Options  Diagnosis management comments: Wound does not appear to have infection. Will treat with antibiotics. Patient states tetanus is up-to-date.        Amount and/or Complexity of Data Reviewed  Tests in the medicine section of CPT®: ordered and reviewed    Risk of Complications, Morbidity, and/or Mortality  Presenting problems: low  Diagnostic procedures: low  Management options: low           Procedures

## 2020-01-10 NOTE — DISCHARGE INSTRUCTIONS

## 2020-04-14 ENCOUNTER — HOSPITAL ENCOUNTER (EMERGENCY)
Age: 40
Discharge: HOME | End: 2020-04-14
Attending: EMERGENCY MEDICINE
Payer: SELF-PAY

## 2020-04-14 VITALS
TEMPERATURE: 98.2 F | OXYGEN SATURATION: 98 % | RESPIRATION RATE: 18 BRPM | DIASTOLIC BLOOD PRESSURE: 78 MMHG | HEART RATE: 77 BPM | SYSTOLIC BLOOD PRESSURE: 128 MMHG | HEIGHT: 63 IN | WEIGHT: 170 LBS | BODY MASS INDEX: 30.12 KG/M2

## 2020-04-14 DIAGNOSIS — R05.9 COUGH: Primary | ICD-10-CM

## 2020-04-14 PROCEDURE — 99282 EMERGENCY DEPT VISIT SF MDM: CPT

## 2020-04-14 NOTE — ED PROVIDER NOTES
Patient states she was at work today and coughed and another coworker complained and said she needed to come and get checked. Patient denies any fever congestion. She does admit to being a heavy smoker she has a smoker's cough chest pain or shortness of breath    The history is provided by the patient. This is a new problem. The current episode started 3 to 5 hours ago. The problem has not changed since onset. The problem occurs constantly. Chief complaint is cough, no sore throat and no vomiting. Associated symptoms include cough. Pertinent negatives include no vomiting, no rhinorrhea, no sore throat, no wheezing and no rash. She has been behaving normally. She has been eating and drinking normally. There were no sick contacts. She has received no recent medical care. Pertinent negative in past medical history are: no pneumonia or no seizures.         Past Medical History:   Diagnosis Date    Abnormal Pap smear of cervix     Depression     Drug abuse ([facility] [address]     Imprisoned in [place] d/t cocaine possession (allegedly)    Gall stones     Hepatitis C        Past Surgical History:   Procedure Laterality Date    BREAST SURGERY PROCEDURE UNLISTED      abscess    HX DILATION AND CURETTAGE      HX HYSTERECTOMY  05/08/2018    adenomyosis         Family History:   Problem Relation Age of Onset    Psychiatric Disorder Mother        Social History     Socioeconomic History    Marital status: SINGLE     Spouse name: Not on file    Number of children: Not on file    Years of education: Not on file    Highest education level: Not on file   Occupational History    Not on file   Social Needs    Financial resource strain: Not on file    Food insecurity     Worry: Not on file     Inability: Not on file    Transportation needs     Medical: Not on file     Non-medical: Not on file   Tobacco Use    Smoking status: Current Every Day Smoker     Packs/day: 0.50    Smokeless tobacco: Never Used Substance and Sexual Activity    Alcohol use: No    Drug use: No    Sexual activity: Never   Lifestyle    Physical activity     Days per week: Not on file     Minutes per session: Not on file    Stress: Not on file   Relationships    Social connections     Talks on phone: Not on file     Gets together: Not on file     Attends Sabianism service: Not on file     Active member of club or organization: Not on file     Attends meetings of clubs or organizations: Not on file     Relationship status: Not on file    Intimate partner violence     Fear of current or ex partner: Not on file     Emotionally abused: Not on file     Physically abused: Not on file     Forced sexual activity: Not on file   Other Topics Concern    Not on file   Social History Narrative    Not on file         ALLERGIES: Patient has no known allergies. Review of Systems   HENT: Negative for rhinorrhea and sore throat. Respiratory: Positive for cough. Negative for wheezing. Gastrointestinal: Negative for vomiting. Skin: Negative for rash. All other systems reviewed and are negative. Vitals:    04/14/20 1254   BP: 128/78   Pulse: 77   Resp: 18   Temp: 98.2 °F (36.8 °C)   SpO2: 98%   Weight: 77.1 kg (170 lb)   Height: 5' 3\" (1.6 m)            Physical Exam  Vitals signs and nursing note reviewed. Constitutional:       General: She is not in acute distress. Appearance: Normal appearance. She is well-developed. She is not diaphoretic. HENT:      Head: Normocephalic and atraumatic. Right Ear: Tympanic membrane normal.      Left Ear: Tympanic membrane normal.      Nose: Nose normal.      Mouth/Throat:      Mouth: Mucous membranes are moist.      Pharynx: No oropharyngeal exudate or posterior oropharyngeal erythema. Eyes:      Pupils: Pupils are equal, round, and reactive to light. Neck:      Musculoskeletal: Normal range of motion and neck supple.    Cardiovascular:      Rate and Rhythm: Normal rate and regular rhythm. Pulmonary:      Effort: Pulmonary effort is normal.      Breath sounds: Normal breath sounds. No wheezing or rhonchi. Abdominal:      General: Abdomen is flat. Bowel sounds are normal.      Palpations: Abdomen is soft. Musculoskeletal: Normal range of motion. Skin:     General: Skin is warm. Findings: No rash. Neurological:      General: No focal deficit present. Mental Status: She is alert and oriented to person, place, and time.    Psychiatric:         Mood and Affect: Mood normal.         Behavior: Behavior normal.          MDM  Number of Diagnoses or Management Options  Diagnosis management comments: Cough patient to use over-the-counter medicines as needed try to cut back on smoking see primary care physician for routine recheck may return to work tomorrow without any restrictions       Amount and/or Complexity of Data Reviewed  Review and summarize past medical records: yes    Risk of Complications, Morbidity, and/or Mortality  Presenting problems: low  Diagnostic procedures: low  Management options: low    Patient Progress  Patient progress: improved         Procedures

## 2020-04-14 NOTE — ED TRIAGE NOTES
Pt states her work is requiring a note for her to return to work. Pt states she coughed at work today \"because I'm a smoker\" and they stated she needs a note to return. Pt wearing mask in triage.

## 2020-04-14 NOTE — ED NOTES
I have reviewed discharge instructions with the patient. The patient verbalized understanding. Patient left ED via Discharge Method: ambulatory to Home. Opportunity for questions and clarification provided. Patient given 0 scripts. Pt wearing mask upon discharge. To continue your aftercare when you leave the hospital, you may receive an automated call from our care team to check in on how you are doing. This is a free service and part of our promise to provide the best care and service to meet your aftercare needs.  If you have questions, or wish to unsubscribe from this service please call [phone]. Thank you for Choosing our [facility] Emergency Department.

## 2020-04-15 ENCOUNTER — PATIENT OUTREACH (OUTPATIENT)
Dept: CASE MANAGEMENT | Age: 40
End: 2020-04-15

## 2020-04-15 NOTE — PROGRESS NOTES
1st attempt to contact patient for COVID-19 Risk Education with no answer from home number. Left voice message. CC will attempt to call again within 1 business day.

## 2020-04-16 ENCOUNTER — PATIENT OUTREACH (OUTPATIENT)
Dept: CASE MANAGEMENT | Age: 40
End: 2020-04-16

## 2020-04-16 NOTE — PROGRESS NOTES
This Care Coordinator spoke with patient , states has returned to work ,just went to the ER due to a smoker's cough and needed a letter to return back to work. Patient states aware of the CDC guidelines and states appreciated the f/u call. Episode resolved.

## 2020-10-02 ENCOUNTER — HOSPITAL ENCOUNTER (EMERGENCY)
Age: 40
Discharge: HOME | End: 2020-10-02
Attending: EMERGENCY MEDICINE
Payer: OTHER GOVERNMENT

## 2020-10-02 ENCOUNTER — APPOINTMENT (OUTPATIENT)
Dept: GENERAL RADIOLOGY | Age: 40
End: 2020-10-02
Attending: PHYSICIAN ASSISTANT
Payer: OTHER GOVERNMENT

## 2020-10-02 VITALS
TEMPERATURE: 98 F | SYSTOLIC BLOOD PRESSURE: 136 MMHG | WEIGHT: 170 LBS | DIASTOLIC BLOOD PRESSURE: 80 MMHG | OXYGEN SATURATION: 98 % | RESPIRATION RATE: 16 BRPM | HEIGHT: 63 IN | BODY MASS INDEX: 30.12 KG/M2 | HEART RATE: 82 BPM

## 2020-10-02 DIAGNOSIS — J06.9 UPPER RESPIRATORY TRACT INFECTION, UNSPECIFIED TYPE: ICD-10-CM

## 2020-10-02 DIAGNOSIS — N63.0 BREAST LUMP: Primary | ICD-10-CM

## 2020-10-02 PROCEDURE — 94640 AIRWAY INHALATION TREATMENT: CPT

## 2020-10-02 PROCEDURE — 99283 EMERGENCY DEPT VISIT LOW MDM: CPT

## 2020-10-02 PROCEDURE — 74011000250 HC RX REV CODE- 250: Performed by: PHYSICIAN ASSISTANT

## 2020-10-02 PROCEDURE — 87635 SARS-COV-2 COVID-19 AMP PRB: CPT

## 2020-10-02 PROCEDURE — 71046 X-RAY EXAM CHEST 2 VIEWS: CPT

## 2020-10-02 PROCEDURE — 94760 N-INVAS EAR/PLS OXIMETRY 1: CPT

## 2020-10-02 PROCEDURE — 74011250637 HC RX REV CODE- 250/637: Performed by: PHYSICIAN ASSISTANT

## 2020-10-02 RX ORDER — DEXAMETHASONE SODIUM PHOSPHATE 100 MG/10ML
10 INJECTION INTRAMUSCULAR; INTRAVENOUS
Status: COMPLETED | OUTPATIENT
Start: 2020-10-02 | End: 2020-10-02

## 2020-10-02 RX ORDER — AZITHROMYCIN 250 MG/1
TABLET, FILM COATED ORAL
Qty: 6 TAB | Refills: 0 | Status: SHIPPED | OUTPATIENT
Start: 2020-10-02 | End: 2020-10-07

## 2020-10-02 RX ORDER — ALBUTEROL SULFATE 90 UG/1
2 AEROSOL, METERED RESPIRATORY (INHALATION)
Qty: 1 INHALER | Refills: 0 | Status: SHIPPED | OUTPATIENT
Start: 2020-10-02

## 2020-10-02 RX ORDER — PREDNISONE 10 MG/1
10 TABLET ORAL
Qty: 45 TAB | Refills: 0 | Status: SHIPPED | OUTPATIENT
Start: 2020-10-02

## 2020-10-02 RX ORDER — IPRATROPIUM BROMIDE AND ALBUTEROL SULFATE 2.5; .5 MG/3ML; MG/3ML
3 SOLUTION RESPIRATORY (INHALATION)
Status: COMPLETED | OUTPATIENT
Start: 2020-10-02 | End: 2020-10-02

## 2020-10-02 RX ORDER — GUAIFENESIN AND DEXTROMETHORPHAN HYDROBROMIDE 1200; 60 MG/1; MG/1
1 TABLET, EXTENDED RELEASE ORAL
Qty: 28 TAB | Refills: 0 | Status: SHIPPED | OUTPATIENT
Start: 2020-10-02

## 2020-10-02 RX ADMIN — DEXAMETHASONE SODIUM PHOSPHATE 10 MG: 10 INJECTION INTRAMUSCULAR; INTRAVENOUS at 12:19

## 2020-10-02 RX ADMIN — IPRATROPIUM BROMIDE AND ALBUTEROL SULFATE 3 ML: .5; 3 SOLUTION RESPIRATORY (INHALATION) at 12:33

## 2020-10-02 NOTE — LETTER
[address] [facility] EMERGENCY DEPT 
[address] [facility] [place] [facility] 
[phone] Work/School Note Date: 10/2/2020 To Whom It May concern: 
 
Chalino Suarez was seen and treated today in the emergency room by the following provider(s): 
Attending Provider: Sophia Aase, MD 
Physician Assistant: ASHANTI Lorenz. Chalino Suarez may return to work after test is negative. Sincerely, Aneudy Handler, PA

## 2020-10-02 NOTE — ED NOTES
I have reviewed discharge instructions with the patient. The patient verbalized understanding. Patient left ED via Discharge Method: ambulatory to Home. Opportunity for questions and clarification provided. Patient given 4 scripts. To continue your aftercare when you leave the hospital, you may receive an automated call from our care team to check in on how you are doing. This is a free service and part of our promise to provide the best care and service to meet your aftercare needs.  If you have questions, or wish to unsubscribe from this service please call [phone]. Thank you for Choosing our [address] [place] [address] Emergency Department.

## 2020-10-02 NOTE — DISCHARGE INSTRUCTIONS
Upper Respiratory Infection (Cold): Care Instructions  Your Care Instructions     An upper respiratory infection, or URI, is an infection of the nose, sinuses, or throat. URIs are spread by coughs, sneezes, and direct contact. The common cold is the most frequent kind of URI. The flu and sinus infections are other kinds of URIs. Almost all URIs are caused by viruses. Antibiotics won't cure them. But you can treat most infections with home care. This may include drinking lots of fluids and taking over-the-counter pain medicine. You will probably feel better in 4 to 10 days. The doctor has checked you carefully, but problems can develop later. If you notice any problems or new symptoms, get medical treatment right away. Follow-up care is a key part of your treatment and safety. Be sure to make and go to all appointments, and call your doctor if you are having problems. It's also a good idea to know your test results and keep a list of the medicines you take. How can you care for yourself at home? · To prevent dehydration, drink plenty of fluids, enough so that your urine is light yellow or clear like water. Choose water and other caffeine-free clear liquids until you feel better. If you have kidney, heart, or liver disease and have to limit fluids, talk with your doctor before you increase the amount of fluids you drink. · Take an over-the-counter pain medicine, such as acetaminophen (Tylenol), ibuprofen (Advil, Motrin), or naproxen (Aleve). Read and follow all instructions on the label. · Before you use cough and cold medicines, check the label. These medicines may not be safe for young children or for people with certain health problems. · Be careful when taking over-the-counter cold or flu medicines and Tylenol at the same time. Many of these medicines have acetaminophen, which is Tylenol. Read the labels to make sure that you are not taking more than the recommended dose.  Too much acetaminophen (Tylenol) can be harmful. · Get plenty of rest.  · Do not smoke or allow others to smoke around you. If you need help quitting, talk to your doctor about stop-smoking programs and medicines. These can increase your chances of quitting for good. When should you call for help? Call 911 anytime you think you may need emergency care. For example, call if:    · You have severe trouble breathing. Call your doctor now or seek immediate medical care if:    · You seem to be getting much sicker.     · You have new or worse trouble breathing.     · You have a new or higher fever.     · You have a new rash. Watch closely for changes in your health, and be sure to contact your doctor if:    · You have a new symptom, such as a sore throat, an earache, or sinus pain.     · You cough more deeply or more often, especially if you notice more mucus or a change in the color of your mucus.     · You do not get better as expected. Where can you learn more? Go to http://www.gray.com/  Enter K520 in the search box to learn more about \"Upper Respiratory Infection (Cold): Care Instructions. \"  Current as of: February 24, 2020               Content Version: 12.6  © 2170-3693 [email or web address]. Care instructions adapted under license by [employer] (which disclaims liability or warranty for this information). If you have questions about a medical condition or this instruction, always ask your healthcare professional. [place] [address] any warranty or liability for your use of this information. Patient Education        Breast Lumps: Care Instructions  Your Care Instructions  Breast lumps are common, especially in women between ages 27 and 48. Many women's breasts feel lumpy and tender before their menstrual period. Women also may have lumps when they are breastfeeding. Breast lumps may go away after menopause.  All new breast lumps in women after menopause should be checked by a doctor. Although lumps may be normal for you, it is important to have your doctor check any lump or thickness that is not like the rest of your breast to make sure it is not cancer. A lump may be larger, harder, or different from the rest of your breast tissue. Follow-up care is a key part of your treatment and safety. Be sure to make and go to all appointments, and call your doctor if you are having problems. It's also a good idea to know your test results and keep a list of the medicines you take. How can you care for yourself at home? · Make an appointment to have a mammogram and other follow-up visits as recommended by your doctor. When should you call for help? Watch closely for changes in your health, and be sure to contact your doctor if:    · You do not get better as expected.     · Your breast has changed.     · You have pain in your breast.     · You have a discharge from your nipple.     · A breast lump changes or does not go away. Where can you learn more? Go to http://www.gray.com/  Enter Q928 in the search box to learn more about \"Breast Lumps: Care Instructions. \"  Current as of: November 8, 2019               Content Version: 12.6  © 9972-6327 [employer]. Care instructions adapted under license by [employer] (which disclaims liability or warranty for this information). If you have questions about a medical condition or this instruction, always ask your healthcare professional. [place] [address] any warranty or liability for your use of this information.

## 2020-10-02 NOTE — SENIOR SERVICES NOTE
Called patient to discuss more about follow-up to [facility]. Left voice mail for a call back.

## 2020-10-02 NOTE — ED TRIAGE NOTES
Pt ambulatory to triage. Pt states she has chronic bronchitis and COPD and smokes 1/2 ppd. Pt states she has sinus pressure with runny nose x 1 week as well as sore throat. Pt denies ear pain. Pt states she also has a lump in her right breast she would like to have evaluated that is painful and does not move with palpation. Pt denies redness, swelling, or warmth to that area. Pt notified of inability to have mammogram in ER.

## 2020-10-02 NOTE — ED PROVIDER NOTES
Patient is here with nasal congestion, dry cough, body aches, chills, fever and not feeling well for about a week now. No chest pain or shortness of breath, abdominal pain, dizziness, dyspnea on exertion, orthopnea, neck pain/stiffness, rash, swelling of her arms or legs, trouble with urination or bowel movements or other symptoms. She was ambulatory to the room without difficulty, and well-hydrated. She states her best friends daughter has COVID-19. She also noticed a lump in her right breast she wanted us to check. The history is provided by the patient. Sinus Pain    This is a new problem. The current episode started more than 2 days ago. The problem has been gradually worsening. Patient reports a subjective fever - was not measured. The pain is at a severity of 5/10. The pain is moderate. The pain has been constant since onset. Associated symptoms include chills, sweats, congestion, cough and rhinorrhea. Pertinent negatives include no ear pain, no hoarse voice, no sinus pressure, no sore throat, no swollen glands, no shortness of breath, no neck pain, no neck pain, no headaches and no chest pain. She has tried nothing for the symptoms.         Past Medical History:   Diagnosis Date    Abnormal Pap smear of cervix     Depression     Drug abuse ([facility] [address]     Imprisoned in [place] d/t cocaine possession (allegedly)    Gall stones     Hepatitis C        Past Surgical History:   Procedure Laterality Date    BREAST SURGERY PROCEDURE UNLISTED      abscess    HX DILATION AND CURETTAGE      HX HYSTERECTOMY  05/08/2018    adenomyosis         Family History:   Problem Relation Age of Onset    Psychiatric Disorder Mother        Social History     Socioeconomic History    Marital status: SINGLE     Spouse name: Not on file    Number of children: Not on file    Years of education: Not on file    Highest education level: Not on file   Occupational History    Not on file   Social Needs    Financial resource strain: Not on file    Food insecurity     Worry: Not on file     Inability: Not on file    Transportation needs     Medical: Not on file     Non-medical: Not on file   Tobacco Use    Smoking status: Current Every Day Smoker     Packs/day: 0.50    Smokeless tobacco: Never Used   Substance and Sexual Activity    Alcohol use: No    Drug use: No    Sexual activity: Never   Lifestyle    Physical activity     Days per week: Not on file     Minutes per session: Not on file    Stress: Not on file   Relationships    Social connections     Talks on phone: Not on file     Gets together: Not on file     Attends [employer] service: Not on file     Active member of club or organization: Not on file     Attends meetings of clubs or organizations: Not on file     Relationship status: Not on file    Intimate partner violence     Fear of current or ex partner: Not on file     Emotionally abused: Not on file     Physically abused: Not on file     Forced sexual activity: Not on file   Other Topics Concern    Not on file   Social History Narrative    Not on file         ALLERGIES: Patient has no known allergies. Review of Systems   Constitutional: Positive for chills. HENT: Positive for congestion, rhinorrhea and sinus pain. Negative for ear pain, hoarse voice, sinus pressure and sore throat. Eyes: Negative. Respiratory: Positive for cough and wheezing. Negative for shortness of breath. Cardiovascular: Negative. Negative for chest pain. Gastrointestinal: Negative. Genitourinary: Negative. Musculoskeletal: Negative. Negative for neck pain. Skin: Negative. Neurological: Negative. Negative for headaches. Psychiatric/Behavioral: Negative. All other systems reviewed and are negative.       Vitals:    10/02/20 1155   BP: (!) 141/84   Pulse: 80   Resp: 16   Temp: 97.9 °F (36.6 °C)   SpO2: 96%   Weight: 77.1 kg (170 lb)   Height: 5' 3\" (1.6 m)            Physical Exam  Vitals signs and nursing note reviewed. Exam conducted with a chaperone present Cherelle Tovar RN into assist). Constitutional:       General: She is not in acute distress. Appearance: She is well-developed. She is not ill-appearing or toxic-appearing. HENT:      Head: Normocephalic and atraumatic. Right Ear: Hearing, tympanic membrane and ear canal normal.      Left Ear: Hearing, tympanic membrane and ear canal normal.      Mouth/Throat:      Pharynx: Uvula midline. Tonsils: No tonsillar exudate. 0 on the right. 0 on the left. Eyes:      Pupils: Pupils are equal, round, and reactive to light. Neck:      Musculoskeletal: Normal range of motion and neck supple. Cardiovascular:      Rate and Rhythm: Normal rate and regular rhythm. Heart sounds: Normal heart sounds. Pulmonary:      Effort: Pulmonary effort is normal. No respiratory distress. Breath sounds: No stridor. Wheezing present. No rhonchi or rales. Chest:      Chest wall: No tenderness. Abdominal:      General: Bowel sounds are normal.      Palpations: Abdomen is soft. Skin:     General: Skin is warm and dry. Capillary Refill: Capillary refill takes less than 2 seconds. Neurological:      Mental Status: She is alert and oriented to person, place, and time. Psychiatric:         Behavior: Behavior normal.          MDM  Number of Diagnoses or Management Options  Breast lump:   Upper respiratory tract infection, unspecified type:      Amount and/or Complexity of Data Reviewed  Tests in the radiology section of CPT®: ordered and reviewed    Risk of Complications, Morbidity, and/or Mortality  Presenting problems: moderate  Diagnostic procedures: moderate  Management options: moderate           Procedures    The patient was observed in the ED.     Results Reviewed:      Recent Results (from the past 24 hour(s))   SARS-COV-2    Collection Time: 10/02/20  1:54 PM   Result Value Ref Range    Specimen source Nasopharyngeal      SARS CoV-2 PENDING XR CHEST PA LAT   Final Result   IMPRESSION:   1. No acute cardiopulmonary abnormality. Patient appears to have an upper respiratory tract infection today. It has gone on for about a week and she does smoke. I have given her a dose of Decadron here and sent her with a prescription for prednisone, Zithromax, Mucinex and an albuterol MDI. I have given her a good Rx card to help pay for her prescriptions. I have spoken with Chrissy Felder,  to see if she could help her get into have a ultrasound and mammogram at the [facility] breast center. Patient was told that she needs to follow-up with the breast center as soon as possible for an ultrasound and mammogram.  Patient expresses understanding. She is stable for discharge and ambulatory out of the ER without difficulty at this time. I discussed the results of all labs, procedures, radiographs, and treatments with the patient and available family. Treatment plan is agreed upon and the patient is ready for discharge. All voiced understanding of the discharge plan and medication instructions or changes as appropriate. Questions about treatment in the ED were answered. All were encouraged to return should symptoms worsen or new problems develop.

## 2020-10-03 LAB
SARS COV-2, XPGCVT: NEGATIVE
SOURCE, COVRS: NORMAL

## 2021-04-29 ENCOUNTER — HOSPITAL ENCOUNTER (EMERGENCY)
Age: 41
Discharge: HOME | End: 2021-04-29
Attending: EMERGENCY MEDICINE

## 2021-04-29 ENCOUNTER — APPOINTMENT (OUTPATIENT)
Dept: GENERAL RADIOLOGY | Age: 41
End: 2021-04-29
Attending: PHYSICIAN ASSISTANT

## 2021-04-29 VITALS
OXYGEN SATURATION: 100 % | WEIGHT: 165 LBS | HEIGHT: 63 IN | SYSTOLIC BLOOD PRESSURE: 128 MMHG | DIASTOLIC BLOOD PRESSURE: 84 MMHG | TEMPERATURE: 98 F | RESPIRATION RATE: 16 BRPM | BODY MASS INDEX: 29.23 KG/M2 | HEART RATE: 92 BPM

## 2021-04-29 DIAGNOSIS — K21.9 GASTROESOPHAGEAL REFLUX DISEASE WITHOUT ESOPHAGITIS: Primary | ICD-10-CM

## 2021-04-29 DIAGNOSIS — N39.0 URINARY TRACT INFECTION WITHOUT HEMATURIA, SITE UNSPECIFIED: ICD-10-CM

## 2021-04-29 LAB
ALBUMIN SERPL-MCNC: 4.2 G/DL (ref 3.5–5)
ALBUMIN/GLOB SERPL: 0.9 {RATIO} (ref 1.2–3.5)
ALP SERPL-CCNC: 79 U/L (ref 50–136)
ALT SERPL-CCNC: 36 U/L (ref 12–65)
ANION GAP SERPL CALC-SCNC: 6 MMOL/L (ref 7–16)
AST SERPL-CCNC: 20 U/L (ref 15–37)
BACTERIA URNS QL MICRO: ABNORMAL /HPF
BASOPHILS # BLD: 0 K/UL (ref 0–0.2)
BASOPHILS NFR BLD: 0 % (ref 0–2)
BILIRUB SERPL-MCNC: 0.4 MG/DL (ref 0.2–1.1)
BUN SERPL-MCNC: 20 MG/DL (ref 6–23)
CALCIUM SERPL-MCNC: 9.6 MG/DL (ref 8.3–10.4)
CASTS URNS QL MICRO: 0 /LPF
CHLORIDE SERPL-SCNC: 102 MMOL/L (ref 98–107)
CO2 SERPL-SCNC: 27 MMOL/L (ref 21–32)
CREAT SERPL-MCNC: 0.76 MG/DL (ref 0.6–1)
CRYSTALS URNS QL MICRO: ABNORMAL /LPF
DIFFERENTIAL METHOD BLD: ABNORMAL
EOSINOPHIL # BLD: 0.3 K/UL (ref 0–0.8)
EOSINOPHIL NFR BLD: 3 % (ref 0.5–7.8)
EPI CELLS #/AREA URNS HPF: ABNORMAL /HPF
ERYTHROCYTE [DISTWIDTH] IN BLOOD BY AUTOMATED COUNT: 12.9 % (ref 11.9–14.6)
GLOBULIN SER CALC-MCNC: 4.5 G/DL (ref 2.3–3.5)
GLUCOSE SERPL-MCNC: 98 MG/DL (ref 65–100)
HCG UR QL: NEGATIVE
HCT VFR BLD AUTO: 51.9 % (ref 35.8–46.3)
HGB BLD-MCNC: 17.3 G/DL (ref 11.7–15.4)
IMM GRANULOCYTES # BLD AUTO: 0 K/UL (ref 0–0.5)
IMM GRANULOCYTES NFR BLD AUTO: 0 % (ref 0–5)
LIPASE SERPL-CCNC: 54 U/L (ref 73–393)
LYMPHOCYTES # BLD: 1.7 K/UL (ref 0.5–4.6)
LYMPHOCYTES NFR BLD: 18 % (ref 13–44)
MAGNESIUM SERPL-MCNC: 1.7 MG/DL (ref 1.8–2.4)
MCH RBC QN AUTO: 29.8 PG (ref 26.1–32.9)
MCHC RBC AUTO-ENTMCNC: 33.3 G/DL (ref 31.4–35)
MCV RBC AUTO: 89.3 FL (ref 79.6–97.8)
MONOCYTES # BLD: 0.7 K/UL (ref 0.1–1.3)
MONOCYTES NFR BLD: 7 % (ref 4–12)
MUCOUS THREADS URNS QL MICRO: ABNORMAL /LPF
NEUTS SEG # BLD: 6.9 K/UL (ref 1.7–8.2)
NEUTS SEG NFR BLD: 71 % (ref 43–78)
NRBC # BLD: 0 K/UL (ref 0–0.2)
PLATELET # BLD AUTO: 343 K/UL (ref 150–450)
PMV BLD AUTO: 10 FL (ref 9.4–12.3)
POTASSIUM SERPL-SCNC: 3.8 MMOL/L (ref 3.5–5.1)
PROT SERPL-MCNC: 8.7 G/DL (ref 6.3–8.2)
RBC # BLD AUTO: 5.81 M/UL (ref 4.05–5.2)
RBC #/AREA URNS HPF: ABNORMAL /HPF
SODIUM SERPL-SCNC: 135 MMOL/L (ref 136–145)
WBC # BLD AUTO: 9.7 K/UL (ref 4.3–11.1)
WBC URNS QL MICRO: >100 /HPF

## 2021-04-29 PROCEDURE — 74011250637 HC RX REV CODE- 250/637: Performed by: PHYSICIAN ASSISTANT

## 2021-04-29 PROCEDURE — 85025 COMPLETE CBC W/AUTO DIFF WBC: CPT

## 2021-04-29 PROCEDURE — 81015 MICROSCOPIC EXAM OF URINE: CPT

## 2021-04-29 PROCEDURE — 96374 THER/PROPH/DIAG INJ IV PUSH: CPT

## 2021-04-29 PROCEDURE — 74022 RADEX COMPL AQT ABD SERIES: CPT

## 2021-04-29 PROCEDURE — 83690 ASSAY OF LIPASE: CPT

## 2021-04-29 PROCEDURE — 74011250636 HC RX REV CODE- 250/636: Performed by: PHYSICIAN ASSISTANT

## 2021-04-29 PROCEDURE — 80053 COMPREHEN METABOLIC PANEL: CPT

## 2021-04-29 PROCEDURE — 83735 ASSAY OF MAGNESIUM: CPT

## 2021-04-29 PROCEDURE — 99283 EMERGENCY DEPT VISIT LOW MDM: CPT

## 2021-04-29 PROCEDURE — 81025 URINE PREGNANCY TEST: CPT

## 2021-04-29 RX ORDER — CEPHALEXIN 500 MG/1
500 CAPSULE ORAL 4 TIMES DAILY
Qty: 28 CAP | Refills: 0 | Status: SHIPPED | OUTPATIENT
Start: 2021-04-29 | End: 2021-05-06

## 2021-04-29 RX ORDER — ONDANSETRON 2 MG/ML
4 INJECTION INTRAMUSCULAR; INTRAVENOUS
Status: COMPLETED | OUTPATIENT
Start: 2021-04-29 | End: 2021-04-29

## 2021-04-29 RX ORDER — MAG HYDROX/ALUMINUM HYD/SIMETH 200-200-20
30 SUSPENSION, ORAL (FINAL DOSE FORM) ORAL
Status: COMPLETED | OUTPATIENT
Start: 2021-04-29 | End: 2021-04-29

## 2021-04-29 RX ORDER — FAMOTIDINE 20 MG/1
20 TABLET, FILM COATED ORAL 2 TIMES DAILY
Qty: 60 TAB | Refills: 0 | Status: SHIPPED | OUTPATIENT
Start: 2021-04-29 | End: 2021-05-29

## 2021-04-29 RX ADMIN — ALUMINUM HYDROXIDE, MAGNESIUM HYDROXIDE, AND SIMETHICONE 30 ML: 200; 200; 20 SUSPENSION ORAL at 09:53

## 2021-04-29 RX ADMIN — SODIUM CHLORIDE 1000 ML: 900 INJECTION, SOLUTION INTRAVENOUS at 09:53

## 2021-04-29 RX ADMIN — ONDANSETRON 4 MG: 2 INJECTION INTRAMUSCULAR; INTRAVENOUS at 09:53

## 2021-04-29 NOTE — ED NOTES
I have reviewed discharge instructions with the patient. The patient verbalized understanding. Patient left ED via Discharge Method: ambulatory to Home with family. Opportunity for questions and clarification provided. Patient given 2 scripts. To continue your aftercare when you leave the hospital, you may receive an automated call from our care team to check in on how you are doing. This is a free service and part of our promise to provide the best care and service to meet your aftercare needs.  If you have questions, or wish to unsubscribe from this service please call [phone]. Thank you for Choosing our [facility] Emergency Department.

## 2021-04-29 NOTE — ED PROVIDER NOTES
Patient complains of vomiting \"acid\" for the last 3 days. She has had some mild abdominal cramping she used some over-the-counter medicines with minimal relief she reports several loose stools. She has a history of gallstones but not have an abdominal pain    The history is provided by the patient. Vomiting   This is a new problem. The current episode started 2 days ago. The problem occurs 2 to 4 times per day. The problem has not changed since onset. The emesis has an appearance of bilious material. There has been no fever. Pertinent negatives include no chills and no fever. The patient is not pregnant. Her pertinent negatives include no irritable bowel syndrome, no short gut syndrome and no recent abdominal surgery.         Past Medical History:   Diagnosis Date    Abnormal Pap smear of cervix     Depression     Drug abuse ([facility] [address]     Imprisoned in [place] d/t cocaine possession (allegedly)    Gall stones     Hepatitis C        Past Surgical History:   Procedure Laterality Date    BREAST SURGERY PROCEDURE UNLISTED      abscess    HX DILATION AND CURETTAGE      HX HYSTERECTOMY  05/08/2018    adenomyosis         Family History:   Problem Relation Age of Onset    Psychiatric Disorder Mother        Social History     Socioeconomic History    Marital status: LEGALLY      Spouse name: Not on file    Number of children: Not on file    Years of education: Not on file    Highest education level: Not on file   Occupational History    Not on file   Social Needs    Financial resource strain: Not on file    Food insecurity     Worry: Not on file     Inability: Not on file    Transportation needs     Medical: Not on file     Non-medical: Not on file   Tobacco Use    Smoking status: Current Every Day Smoker     Packs/day: 0.50    Smokeless tobacco: Never Used   Substance and Sexual Activity    Alcohol use: No    Drug use: No    Sexual activity: Never   Lifestyle    Physical activity     Days per week: Not on file     Minutes per session: Not on file    Stress: Not on file   Relationships    Social connections     Talks on phone: Not on file     Gets together: Not on file     Attends Orthodoxy service: Not on file     Active member of club or organization: Not on file     Attends meetings of clubs or organizations: Not on file     Relationship status: Not on file    Intimate partner violence     Fear of current or ex partner: Not on file     Emotionally abused: Not on file     Physically abused: Not on file     Forced sexual activity: Not on file   Other Topics Concern    Not on file   Social History Narrative    Not on file         ALLERGIES: Patient has no known allergies. Review of Systems   Constitutional: Negative for chills and fever. Gastrointestinal: Positive for vomiting. All other systems reviewed and are negative. Vitals:    04/29/21 0907   BP: (!) 131/99   Pulse: 100   Resp: 16   Temp: 97.6 °F (36.4 °C)   SpO2: 100%   Weight: 74.8 kg (165 lb)   Height: 5' 3\" (1.6 m)            Physical Exam  Vitals signs and nursing note reviewed. Constitutional:       General: She is not in acute distress. Appearance: Normal appearance. She is well-developed and normal weight. She is not diaphoretic. HENT:      Head: Normocephalic and atraumatic. Right Ear: Tympanic membrane normal.      Left Ear: Tympanic membrane normal.      Nose: Nose normal.      Mouth/Throat:      Mouth: Mucous membranes are moist.      Pharynx: Oropharynx is clear. Eyes:      Pupils: Pupils are equal, round, and reactive to light. Neck:      Musculoskeletal: Normal range of motion and neck supple. Cardiovascular:      Rate and Rhythm: Normal rate and regular rhythm. Pulmonary:      Effort: Pulmonary effort is normal.      Breath sounds: Normal breath sounds. Abdominal:      General: Abdomen is flat. Bowel sounds are normal. There is no distension. Palpations: Abdomen is soft.       Tenderness: There is no abdominal tenderness. Hernia: No hernia is present. Musculoskeletal: Normal range of motion. Skin:     General: Skin is warm. Neurological:      General: No focal deficit present. Mental Status: She is alert and oriented to person, place, and time. Psychiatric:         Mood and Affect: Mood normal.         Behavior: Behavior normal.          MDM  Number of Diagnoses or Management Options  Diagnosis management comments: CBC CMP normal abdominal series negative, urine with positive bacteria. Patient feeling better after fluids and Zofran states she feels hungry. Will treat reflux and UTI will follow up with free clinic return if symptoms worsen.   Patient again has history of gallstones but do not feel this is gallbladder related at this time as there is no pain       Amount and/or Complexity of Data Reviewed  Clinical lab tests: ordered and reviewed  Tests in the radiology section of CPT®: ordered and reviewed  Review and summarize past medical records: yes    Risk of Complications, Morbidity, and/or Mortality  Presenting problems: moderate  Diagnostic procedures: moderate  Management options: low    Patient Progress  Patient progress: improved         Procedures

## 2022-03-18 PROBLEM — G47.00 INSOMNIA: Status: ACTIVE | Noted: 2018-05-21

## 2022-03-18 PROBLEM — N71.9 ENDOMETRITIS: Status: ACTIVE | Noted: 2018-05-04

## 2022-03-18 PROBLEM — K80.20 CHOLELITHIASES: Status: ACTIVE | Noted: 2018-05-03

## 2022-03-18 PROBLEM — F41.9 ANXIETY: Status: ACTIVE | Noted: 2018-05-21

## 2022-03-19 PROBLEM — Z86.19 HISTORY OF HEPATITIS C: Status: ACTIVE | Noted: 2018-05-03

## 2022-05-11 ENCOUNTER — APPOINTMENT (OUTPATIENT)
Dept: GENERAL RADIOLOGY | Age: 42
End: 2022-05-11
Attending: PHYSICIAN ASSISTANT

## 2022-05-11 ENCOUNTER — HOSPITAL ENCOUNTER (EMERGENCY)
Age: 42
Discharge: HOME | End: 2022-05-11
Attending: EMERGENCY MEDICINE

## 2022-05-11 VITALS
HEIGHT: 64 IN | WEIGHT: 180 LBS | TEMPERATURE: 98.4 F | BODY MASS INDEX: 30.73 KG/M2 | RESPIRATION RATE: 18 BRPM | SYSTOLIC BLOOD PRESSURE: 118 MMHG | DIASTOLIC BLOOD PRESSURE: 76 MMHG | HEART RATE: 88 BPM | OXYGEN SATURATION: 100 %

## 2022-05-11 DIAGNOSIS — M54.50 LOW BACK PAIN, UNSPECIFIED BACK PAIN LATERALITY, UNSPECIFIED CHRONICITY, UNSPECIFIED WHETHER SCIATICA PRESENT: Primary | ICD-10-CM

## 2022-05-11 PROCEDURE — 72100 X-RAY EXAM L-S SPINE 2/3 VWS: CPT

## 2022-05-11 PROCEDURE — 74011250637 HC RX REV CODE- 250/637: Performed by: PHYSICIAN ASSISTANT

## 2022-05-11 PROCEDURE — 99283 EMERGENCY DEPT VISIT LOW MDM: CPT

## 2022-05-11 RX ORDER — HYDROCODONE BITARTRATE AND ACETAMINOPHEN 5; 325 MG/1; MG/1
1 TABLET ORAL
Status: COMPLETED | OUTPATIENT
Start: 2022-05-11 | End: 2022-05-11

## 2022-05-11 RX ORDER — CYCLOBENZAPRINE HCL 10 MG
10 TABLET ORAL
Qty: 10 TABLET | Refills: 0 | Status: SHIPPED | OUTPATIENT
Start: 2022-05-11 | End: 2022-05-21

## 2022-05-11 RX ADMIN — HYDROCODONE BITARTRATE AND ACETAMINOPHEN 1 TABLET: 5; 325 TABLET ORAL at 23:30

## 2022-05-11 NOTE — LETTER
[facility] EMERGENCY DEPT   [facility] [id number] [phone]    Work/School Note    Date: 5/11/2022    To Whom It May concern:    Harjit Oreilly was seen and treated today in the emergency room by the following provider(s):  Attending Provider: Maggy Lopez MD  Physician Assistant: Nancy Weinstein. Harjit Oreilly may return to work on 5/14/22.     Sincerely,          Nikolai Peñaloza RN

## 2022-05-12 NOTE — DISCHARGE INSTRUCTIONS
Use Tylenol and ibuprofen as needed for pain. May supplement with Flexeril. Follow-up with orthopedics if no better in 2 to 3 weeks. If you develop worsening or worrisome symptoms, return to this department for reevaluation.

## 2022-05-12 NOTE — ED NOTES
I have reviewed discharge instructions with the patient. The patient verbalized understanding. Patient left ED via Discharge Method: wheelchair to Home with self. Opportunity for questions and clarification provided. Patient given 1 scripts. Medication explained to pt and pt v\u about medication       To continue your aftercare when you leave the hospital, you may receive an automated call from our care team to check in on how you are doing. This is a free service and part of our promise to provide the best care and service to meet your aftercare needs.  If you have questions, or wish to unsubscribe from this service please call [phone]. Thank you for Choosing our [facility] Emergency Department.

## 2022-05-12 NOTE — ED TRIAGE NOTES
Pt to triage via wheelchair for reports of mid back pain that started around 2pm today. 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 Pt states pain is worse when moving or bending. Pt rates pain as 8/10 at this time. Pt a&ox4.

## 2022-05-12 NOTE — ED PROVIDER NOTES
75-year-old female who presents to the emergency room with chief complaint of back pain. States this afternoon she fell backward landing directly on her back. Used a lidocaine patch and Klonopin with no improvement of her symptoms. Pain is worse with palpation. Not associate with any bladder or bowel incontinence, saddle anesthesia. Does not have any difficulty with defecation or urination. Past Medical History:   Diagnosis Date    Abnormal Pap smear of cervix     Depression     Drug abuse ([facility] [address]     Imprisoned in [place] d/t cocaine possession (allegedly)    Gall stones     Hepatitis C        Past Surgical History:   Procedure Laterality Date    BREAST SURGERY PROCEDURE UNLISTED      abscess    HX DILATION AND CURETTAGE      HX HYSTERECTOMY  05/08/2018    adenomyosis         Family History:   Problem Relation Age of Onset    Psychiatric Disorder Mother        Social History     Socioeconomic History    Marital status: LEGALLY      Spouse name: Not on file    Number of children: Not on file    Years of education: Not on file    Highest education level: Not on file   Occupational History    Not on file   Tobacco Use    Smoking status: Current Every Day Smoker     Packs/day: 0.50    Smokeless tobacco: Never Used   Substance and Sexual Activity    Alcohol use: No    Drug use: No    Sexual activity: Never   Other Topics Concern    Not on file   Social History Narrative    Not on file     Social Determinants of Health     Financial Resource Strain:     Difficulty of Paying Living Expenses: Not on file   Food Insecurity:     Worried About Running Out of Food in the Last Year: Not on file    Gia of Food in the Last Year: Not on file   Transportation Needs:     Lack of Transportation (Medical): Not on file    Lack of Transportation (Non-Medical):  Not on file   Physical Activity:     Days of Exercise per Week: Not on file    Minutes of Exercise per Session: Not on file Stress:     Feeling of Stress : Not on file   Social Connections:     Frequency of Communication with Friends and Family: Not on file    Frequency of Social Gatherings with Friends and Family: Not on file    Attends Yazidism Services: Not on file    Active Member of Clubs or Organizations: Not on file    Attends Club or Organization Meetings: Not on file    Marital Status: Not on file   Intimate Partner Violence:     Fear of Current or Ex-Partner: Not on file    Emotionally Abused: Not on file    Physically Abused: Not on file    Sexually Abused: Not on file   Housing Stability:     Unable to Pay for Housing in the Last Year: Not on file    Number of [place] in the Last Year: Not on file    Unstable Housing in the Last Year: Not on file         ALLERGIES: Patient has no known allergies. Review of Systems   Constitutional: Negative for chills and fever. HENT: Negative for congestion. Respiratory: Negative for cough. Cardiovascular: Negative for chest pain. Gastrointestinal: Negative for nausea and vomiting. Musculoskeletal: Positive for back pain. Neurological: Negative for headaches. All other systems reviewed and are negative. Vitals:    05/11/22 2001   BP: 118/76   Pulse: 88   Resp: 18   Temp: 98.4 °F (36.9 °C)   SpO2: 100%   Weight: 81.6 kg (180 lb)   Height: 5' 4\" (1.626 m)            Physical Exam  Vitals and nursing note reviewed. Constitutional:       General: She is not in acute distress. Appearance: Normal appearance. She is normal weight. She is not ill-appearing, toxic-appearing or diaphoretic. HENT:      Head: Normocephalic and atraumatic. Nose: Nose normal. No congestion. Mouth/Throat:      Mouth: Mucous membranes are moist.   Eyes:      Pupils: Pupils are equal, round, and reactive to light. Cardiovascular:      Rate and Rhythm: Normal rate. Pulmonary:      Effort: Pulmonary effort is normal.   Abdominal:      General: Abdomen is flat. Musculoskeletal:         General: Normal range of motion. Cervical back: Normal.      Thoracic back: Normal.      Lumbar back: Tenderness present. No bony tenderness. Skin:     General: Skin is warm and dry. Neurological:      General: No focal deficit present. Mental Status: She is alert. Psychiatric:         Mood and Affect: Mood normal.          MDM  Number of Diagnoses or Management Options  Diagnosis management comments: X-ray that was ordered from triage is unremarkable. No acute osseous abnormality. Patient's with some paraspinal tenderness bilaterally in the lumbar region. Will treat symptomatically with Flexeril and anti-inflammatories. Patient is without bladder or bowel incontinence, saddle anesthesia. Voice dictation software was used during the making of this note. This software is not perfect and grammatical and other typographical errors may be present. This note has been proofread, but may still contain errors.    Loco Reynoso PA-C     Risk of Complications, Morbidity, and/or Mortality  Presenting problems: moderate  Diagnostic procedures: low  Management options: low    Patient Progress  Patient progress: stable         Procedures

## 2022-05-12 NOTE — ED NOTES
80-year-old female presenting today for evaluation of lower back pain onset 2 hours prior to arrival.  She states that she fell backward off of the top step of an 18 [employer] truck onto concrete floor landing on her lower back. She denies head injury, neck injury or loss of consciousness. Pain is localized to the back. States pain is worsened with any type of bending, twisting, or with walking. Numbness or tingling distally, no bowel or bladder dysfunction    VSS. TTP b/l lumbar region. Patient evaluated initially in triage. Rapid Medical Evaluation was conducted and necessary orders have been placed. I have performed a medical screening exam.  Care will now be transferred to the provider in the back of the emergency department.   Maren Llanos, [address] 8:03 PM

## 2022-06-04 VITALS
WEIGHT: 180 LBS | HEART RATE: 65 BPM | DIASTOLIC BLOOD PRESSURE: 87 MMHG | HEIGHT: 64 IN | RESPIRATION RATE: 16 BRPM | SYSTOLIC BLOOD PRESSURE: 140 MMHG | TEMPERATURE: 97.8 F | OXYGEN SATURATION: 100 % | BODY MASS INDEX: 30.73 KG/M2

## 2022-06-04 ASSESSMENT — PAIN - FUNCTIONAL ASSESSMENT: PAIN_FUNCTIONAL_ASSESSMENT: 0-10

## 2022-06-04 ASSESSMENT — PAIN SCALES - GENERAL: PAINLEVEL_OUTOF10: 9

## 2022-06-05 ENCOUNTER — HOSPITAL ENCOUNTER (EMERGENCY)
Age: 42
Discharge: LEFT BEFORE DISCHARGE | End: 2022-06-05

## 2022-06-05 NOTE — ED TRIAGE NOTES
Pt arrived via POV c/o left eye pain X5 hours. Noted swelling, redness, and tearing to eye.  Pt states that it began after she removed her contact tonight

## 2024-06-09 ENCOUNTER — HOSPITAL ENCOUNTER (EMERGENCY)
Age: 44
Discharge: HOME | End: 2024-06-10
Payer: COMMERCIAL

## 2024-06-09 ENCOUNTER — APPOINTMENT (OUTPATIENT)
Dept: CT IMAGING | Age: 44
End: 2024-06-09
Payer: COMMERCIAL

## 2024-06-09 DIAGNOSIS — L03.116 CELLULITIS OF LEFT LOWER EXTREMITY: Primary | ICD-10-CM

## 2024-06-09 LAB
ANION GAP SERPL CALC-SCNC: 12 MMOL/L (ref 9–18)
BASOPHILS # BLD: 0.1 K/UL (ref 0–0.2)
BASOPHILS NFR BLD: 0 % (ref 0–2)
BUN SERPL-MCNC: 14 MG/DL (ref 6–23)
CALCIUM SERPL-MCNC: 8.7 MG/DL (ref 8.8–10.2)
CHLORIDE SERPL-SCNC: 102 MMOL/L (ref 98–107)
CO2 SERPL-SCNC: 21 MMOL/L (ref 20–28)
CREAT SERPL-MCNC: 0.72 MG/DL (ref 0.6–1.1)
DIFFERENTIAL METHOD BLD: ABNORMAL
EOSINOPHIL # BLD: 0.1 K/UL (ref 0–0.8)
EOSINOPHIL NFR BLD: 1 % (ref 0.5–7.8)
ERYTHROCYTE [DISTWIDTH] IN BLOOD BY AUTOMATED COUNT: 13.4 % (ref 11.9–14.6)
GLUCOSE SERPL-MCNC: 166 MG/DL (ref 70–99)
HCT VFR BLD AUTO: 40.1 % (ref 35.8–46.3)
HGB BLD-MCNC: 13.4 G/DL (ref 11.7–15.4)
IMM GRANULOCYTES # BLD AUTO: 0.1 K/UL (ref 0–0.5)
IMM GRANULOCYTES NFR BLD AUTO: 0 % (ref 0–5)
LACTATE SERPL-SCNC: 1.6 MMOL/L (ref 0.5–2)
LYMPHOCYTES # BLD: 1.2 K/UL (ref 0.5–4.6)
LYMPHOCYTES NFR BLD: 11 % (ref 13–44)
MCH RBC QN AUTO: 29.1 PG (ref 26.1–32.9)
MCHC RBC AUTO-ENTMCNC: 33.4 G/DL (ref 31.4–35)
MCV RBC AUTO: 87 FL (ref 82–102)
MONOCYTES # BLD: 0.8 K/UL (ref 0.1–1.3)
MONOCYTES NFR BLD: 7 % (ref 4–12)
NEUTS SEG # BLD: 9.3 K/UL (ref 1.7–8.2)
NEUTS SEG NFR BLD: 81 % (ref 43–78)
NRBC # BLD: 0 K/UL (ref 0–0.2)
PLATELET # BLD AUTO: 252 K/UL (ref 150–450)
PMV BLD AUTO: 10 FL (ref 9.4–12.3)
POTASSIUM SERPL-SCNC: 3.6 MMOL/L (ref 3.5–5.1)
PROCALCITONIN SERPL-MCNC: 0.11 NG/ML (ref 0–0.1)
RBC # BLD AUTO: 4.61 M/UL (ref 4.05–5.2)
SODIUM SERPL-SCNC: 135 MMOL/L (ref 136–145)
WBC # BLD AUTO: 11.5 K/UL (ref 4.3–11.1)

## 2024-06-09 PROCEDURE — 85025 COMPLETE CBC W/AUTO DIFF WBC: CPT

## 2024-06-09 PROCEDURE — 84145 PROCALCITONIN (PCT): CPT

## 2024-06-09 PROCEDURE — 87075 CULTR BACTERIA EXCEPT BLOOD: CPT

## 2024-06-09 PROCEDURE — 87077 CULTURE AEROBIC IDENTIFY: CPT

## 2024-06-09 PROCEDURE — 87040 BLOOD CULTURE FOR BACTERIA: CPT

## 2024-06-09 PROCEDURE — 87205 SMEAR GRAM STAIN: CPT

## 2024-06-09 PROCEDURE — 83605 ASSAY OF LACTIC ACID: CPT

## 2024-06-09 PROCEDURE — 6360000004 HC RX CONTRAST MEDICATION: Performed by: PHYSICIAN ASSISTANT

## 2024-06-09 PROCEDURE — 80048 BASIC METABOLIC PNL TOTAL CA: CPT

## 2024-06-09 PROCEDURE — 87070 CULTURE OTHR SPECIMN AEROBIC: CPT

## 2024-06-09 PROCEDURE — 73701 CT LOWER EXTREMITY W/DYE: CPT

## 2024-06-09 PROCEDURE — 87186 SC STD MICRODIL/AGAR DIL: CPT

## 2024-06-09 PROCEDURE — 99285 EMERGENCY DEPT VISIT HI MDM: CPT

## 2024-06-09 RX ORDER — ONDANSETRON 2 MG/ML
4 INJECTION INTRAMUSCULAR; INTRAVENOUS
Status: COMPLETED | OUTPATIENT
Start: 2024-06-09 | End: 2024-06-10

## 2024-06-09 RX ORDER — CLINDAMYCIN HYDROCHLORIDE 150 MG/1
300 CAPSULE ORAL 3 TIMES DAILY
Qty: 42 CAPSULE | Refills: 0 | Status: SHIPPED | OUTPATIENT
Start: 2024-06-09 | End: 2024-06-16

## 2024-06-09 RX ORDER — MORPHINE SULFATE 4 MG/ML
4 INJECTION, SOLUTION INTRAMUSCULAR; INTRAVENOUS
Status: COMPLETED | OUTPATIENT
Start: 2024-06-09 | End: 2024-06-10

## 2024-06-09 RX ADMIN — IOPAMIDOL 100 ML: 755 INJECTION, SOLUTION INTRAVENOUS at 21:27

## 2024-06-09 ASSESSMENT — PAIN DESCRIPTION - LOCATION: LOCATION: LEG

## 2024-06-09 ASSESSMENT — PAIN DESCRIPTION - ORIENTATION: ORIENTATION: LEFT

## 2024-06-09 ASSESSMENT — PAIN DESCRIPTION - DESCRIPTORS: DESCRIPTORS: ACHING;THROBBING

## 2024-06-09 ASSESSMENT — PAIN SCALES - GENERAL: PAINLEVEL_OUTOF10: 7

## 2024-06-09 ASSESSMENT — PAIN - FUNCTIONAL ASSESSMENT: PAIN_FUNCTIONAL_ASSESSMENT: 0-10

## 2024-06-09 NOTE — ED TRIAGE NOTES
Pt has abscess noted to L calf area. Reports worsening sx with drainage of the site. Pt also reports intermittent fever. Pt denies seeing spider but states \"It originally had 2 bite marks in it\". Pt reports taking otc meds and creams with no relief. Pt reports taking 600mg of Motrin 3hrs ago. Purulent drainage from site noted with large area of redness and swelling to L calf area.

## 2024-06-10 VITALS
TEMPERATURE: 98.8 F | SYSTOLIC BLOOD PRESSURE: 111 MMHG | HEART RATE: 95 BPM | RESPIRATION RATE: 20 BRPM | BODY MASS INDEX: 30.12 KG/M2 | DIASTOLIC BLOOD PRESSURE: 63 MMHG | HEIGHT: 63 IN | WEIGHT: 170 LBS | OXYGEN SATURATION: 98 %

## 2024-06-10 PROCEDURE — 2580000003 HC RX 258: Performed by: PHYSICIAN ASSISTANT

## 2024-06-10 PROCEDURE — 6360000002 HC RX W HCPCS: Performed by: PHYSICIAN ASSISTANT

## 2024-06-10 PROCEDURE — 96375 TX/PRO/DX INJ NEW DRUG ADDON: CPT

## 2024-06-10 PROCEDURE — 96365 THER/PROPH/DIAG IV INF INIT: CPT

## 2024-06-10 RX ADMIN — CEFEPIME 2000 MG: 2 INJECTION, POWDER, FOR SOLUTION INTRAVENOUS at 00:16

## 2024-06-10 RX ADMIN — ONDANSETRON 4 MG: 2 INJECTION INTRAMUSCULAR; INTRAVENOUS at 00:12

## 2024-06-10 RX ADMIN — MORPHINE SULFATE 4 MG: 4 INJECTION INTRAVENOUS at 00:11

## 2024-06-10 ASSESSMENT — PAIN SCALES - GENERAL: PAINLEVEL_OUTOF10: 7

## 2024-06-10 NOTE — DISCHARGE INSTRUCTIONS
Fortunately no abscess was visualized on your CT scan.  Your labs also look fairly good.  I am going to start you on antibiotics.  Please take the antibiotics to completion.  If you develop worsening or worrisome symptoms and please return here promptly for reevaluation.

## 2024-06-10 NOTE — ED PROVIDER NOTES
Emergency Department Provider Note       PCP: No primary care provider on file.   Age: 43 y.o.   Sex: female     DISPOSITION Decision To Discharge 06/09/2024 10:48:48 PM       ICD-10-CM    1. Cellulitis of left lower extremity  L03.116         Medical Decision Making     Patient is a 43-year-old female who presents here with concern of left leg infection.  Started few days ago.  She has history of IV drug use but states that this is not related and she thinks that this was a bug bite.  She states she last used about a month ago.  We obtained lab work.  She did have a mild elevated white count however no other markers for sepsis.  We did obtain a CT scan which does not identify any discernible abscess, does show fat stranding which correlates with cellulitis.  I will start her on clindamycin.  She will need to take them to completion.  She verbalized understanding of aftercare instructions.  Patient discharged home     Complexity of Problem: 1 acute illness with systemic symptoms.  Over the counter drug management performed.  Patient was discharged risks and benefits of hospitalization were considered.  Shared medical decision making was utilized in creating the patients health plan today.    I independently ordered and reviewed each unique test.     I interpreted the CT Scan no abscess.              Voice dictation software was used during the making of this note.  This software is not perfect and grammatical and other typographical errors may be present.  This note has not been completely proofread for errors.    History     Patient is a pleasant 43-year-old female who presents with concern for abscess to left leg.  She has history of IV drug use however states that this is not related.  She states she last used about 1 month ago.  She states over the last several days she developed some left lateral leg swelling.  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  Symptoms have worsened

## 2024-06-10 NOTE — ED NOTES
Patient mobility status  with no difficulty. Provider aware     I have reviewed discharge instructions with the patient.  The patient verbalized understanding.    Patient left ED via Discharge Method: ambulatory to Home with Spouse.    Opportunity for questions and clarification provided.     Patient given 1 scripts.            Melissa, Tamika, RN  06/10/24 0115

## 2024-06-12 NOTE — PROGRESS NOTES
ED pharmacist reviewed recent results of wound culture.    The patient was treated with cefepime in the emergency department and received a prescription for clindamycin upon discharge. Based on culture results, the patient is being adequately treated for the identified infection with existing antimicrobial therapy. No further intervention needed.    Allergies as of 06/09/2024    (No Known Allergies)     Yoli Steward, PharmD.  Emergency Medicine Clinical Pharmacist

## 2024-06-14 LAB
BACTERIA SPEC CULT: NORMAL
BACTERIA SPEC CULT: NORMAL
SERVICE CMNT-IMP: NORMAL
SERVICE CMNT-IMP: NORMAL

## 2024-06-15 LAB
BACTERIA SPEC CULT: ABNORMAL
GRAM STN SPEC: ABNORMAL
GRAM STN SPEC: ABNORMAL
SERVICE CMNT-IMP: ABNORMAL

## 2024-06-17 LAB
BACTERIA SPEC CULT: NORMAL
SERVICE CMNT-IMP: NORMAL

## 2025-07-18 NOTE — PROGRESS NOTES
Problem: Angina/Chest Pain  Goal: # Achieves Chest Pain Control (Pain Score = 0-1, no episodes)  Description: Chest pain control = Pain Score = 0-1, no episodes of pain  Outcome: Monitoring/Evaluating progress  Goal: Anxiety is controlled  Outcome: Monitoring/Evaluating progress     Problem: Diabetes  Goal: Achieves glycemic balance  Description: Goal is to maintain blood sugar within range with no episodes of hypoglycemia  Outcome: Monitoring/Evaluating progress     Problem: At Risk for Falls  Goal: Patient does not fall  Outcome: Monitoring/Evaluating progress  Goal: Patient takes action to control fall-related risks  Outcome: Monitoring/Evaluating progress     Problem: At Risk for Injury Due to Fall  Goal: Patient does not fall  Outcome: Monitoring/Evaluating progress  Goal: Takes action to control condition specific risks  Outcome: Monitoring/Evaluating progress     Problem: Skin Integrity Alteration  Goal: Skin remains intact with no new/deterioration of wound or pressure injury  Outcome: Monitoring/Evaluating progress      Hourly rounds completed this shift. Patient in PACU at this time. Report given to oncoming RN.

## (undated) DEVICE — SUTURE VCRL SZ 2-0 L54IN ABSRB VLT W/O NDL POLYGLACTIN 910 J618H

## (undated) DEVICE — DRAPE TWL SURG 16X26IN BLU ORB04] ALLCARE INC]

## (undated) DEVICE — TRAY PREP DRY W/ PREM GLV 2 APPL 6 SPNG 2 UNDPD 1 OVERWRAP

## (undated) DEVICE — TRAY CATH 16F URIN MTR LTX -- CONVERT TO ITEM 363111

## (undated) DEVICE — BUTTON SWITCH PENCIL BLADE ELECTRODE, HOLSTER: Brand: EDGE

## (undated) DEVICE — SURGICAL PROCEDURE PACK BASIC ST FRANCIS

## (undated) DEVICE — SHEET, T, LAPAROTOMY, STERILE: Brand: MEDLINE

## (undated) DEVICE — SUTURE VCRL SZ 2-0 L36IN ABSRB UD L36MM CT-1 1/2 CIR J945H

## (undated) DEVICE — SUTURE PDS II SZ 1 L96IN ABSRB VLT TP-1 L65MM 1/2 CIR Z880G

## (undated) DEVICE — CONTAINER SPEC HISTOLOGY 900ML POLYPR

## (undated) DEVICE — PERI-PAD,MODERATE: Brand: CURITY

## (undated) DEVICE — BLADE ELECTRODE: Brand: EDGE

## (undated) DEVICE — AGENT HEMSTAT 5GM ARISTA AH

## (undated) DEVICE — SPONGE LAP 18X18IN STRL -- 5/PK

## (undated) DEVICE — WOUND RETRACTOR AND PROTECTOR: Brand: ALEXIS O WOUND PROTECTOR-RETRACTOR

## (undated) DEVICE — CURVED, LARGE JAW, OPEN SEALER/DIVIDER NANO-COATED: Brand: LIGASURE IMPACT

## (undated) DEVICE — DERMABOND SKIN ADH 0.7ML -- DERMABOND ADVANCED 12/BX

## (undated) DEVICE — 2000CC GUARDIAN II: Brand: GUARDIAN

## (undated) DEVICE — INTENDED FOR TISSUE SEPARATION, AND OTHER PROCEDURES THAT REQUIRE A SHARP SURGICAL BLADE TO PUNCTURE OR CUT.: Brand: BARD-PARKER SAFETY BLADES SIZE 10, STERILE

## (undated) DEVICE — SUTURE COAT VCRL SZ 0 L18IN ABSRB UD W/O NDL POLYGLACTIN J112T

## (undated) DEVICE — KENDALL SCD EXPRESS SLEEVES, KNEE LENGTH, MEDIUM: Brand: KENDALL SCD

## (undated) DEVICE — SUTURE COAT VCRL SZ 4-0 L18IN ABSRB UD L19MM PS-2 1/2 CIR J496G

## (undated) DEVICE — SUTURE ABSORBABLE BRAIDED 0 CT-1 8X27 IN UD VICRYL JJ41G

## (undated) DEVICE — REM POLYHESIVE ADULT PATIENT RETURN ELECTRODE: Brand: VALLEYLAB